# Patient Record
Sex: MALE | Race: WHITE | Employment: OTHER | URBAN - METROPOLITAN AREA
[De-identification: names, ages, dates, MRNs, and addresses within clinical notes are randomized per-mention and may not be internally consistent; named-entity substitution may affect disease eponyms.]

---

## 2017-01-18 ENCOUNTER — RECORDS - HEALTHEAST (OUTPATIENT)
Dept: LAB | Facility: CLINIC | Age: 82
End: 2017-01-18

## 2017-01-18 LAB — PSA SERPL-MCNC: <0.1 NG/ML (ref 0–6.5)

## 2017-07-19 ENCOUNTER — RECORDS - HEALTHEAST (OUTPATIENT)
Dept: LAB | Facility: CLINIC | Age: 82
End: 2017-07-19

## 2017-07-19 LAB
CHOLEST SERPL-MCNC: 148 MG/DL
FASTING STATUS PATIENT QL REPORTED: ABNORMAL
HDLC SERPL-MCNC: 33 MG/DL
LDLC SERPL CALC-MCNC: 77 MG/DL
TRIGL SERPL-MCNC: 189 MG/DL

## 2018-01-29 ENCOUNTER — RECORDS - HEALTHEAST (OUTPATIENT)
Dept: LAB | Facility: CLINIC | Age: 83
End: 2018-01-29

## 2018-01-29 ENCOUNTER — RECORDS - HEALTHEAST (OUTPATIENT)
Dept: ADMINISTRATIVE | Facility: OTHER | Age: 83
End: 2018-01-29

## 2018-01-29 LAB
ANION GAP SERPL CALCULATED.3IONS-SCNC: 11 MMOL/L (ref 5–18)
BUN SERPL-MCNC: 18 MG/DL (ref 8–28)
CALCIUM SERPL-MCNC: 9.1 MG/DL (ref 8.5–10.5)
CHLORIDE BLD-SCNC: 102 MMOL/L (ref 98–107)
CO2 SERPL-SCNC: 23 MMOL/L (ref 22–31)
CREAT SERPL-MCNC: 1.04 MG/DL (ref 0.7–1.3)
GFR SERPL CREATININE-BSD FRML MDRD: >60 ML/MIN/1.73M2
GLUCOSE BLD-MCNC: 166 MG/DL (ref 70–125)
POTASSIUM BLD-SCNC: 4.1 MMOL/L (ref 3.5–5)
SODIUM SERPL-SCNC: 136 MMOL/L (ref 136–145)

## 2018-02-20 ENCOUNTER — COMMUNICATION - HEALTHEAST (OUTPATIENT)
Dept: SCHEDULING | Facility: CLINIC | Age: 83
End: 2018-02-20

## 2018-02-20 DIAGNOSIS — I10 ACCELERATED HYPERTENSION: ICD-10-CM

## 2018-03-07 ENCOUNTER — OFFICE VISIT - HEALTHEAST (OUTPATIENT)
Dept: PULMONOLOGY | Facility: OTHER | Age: 83
End: 2018-03-07

## 2018-03-07 DIAGNOSIS — R05.3 CHRONIC COUGH: ICD-10-CM

## 2018-03-07 DIAGNOSIS — R06.09 DYSPNEA ON EXERTION: ICD-10-CM

## 2018-03-07 DIAGNOSIS — J32.9 CHRONIC RHINOSINUSITIS: ICD-10-CM

## 2018-03-07 ASSESSMENT — MIFFLIN-ST. JEOR: SCORE: 1827.97

## 2018-03-14 ENCOUNTER — RECORDS - HEALTHEAST (OUTPATIENT)
Dept: LAB | Facility: CLINIC | Age: 83
End: 2018-03-14

## 2018-03-14 LAB — PSA SERPL-MCNC: <0.1 NG/ML (ref 0–6.5)

## 2018-04-09 ENCOUNTER — HOSPITAL ENCOUNTER (OUTPATIENT)
Dept: CT IMAGING | Facility: HOSPITAL | Age: 83
Discharge: HOME | End: 2018-04-09
Attending: INTERNAL MEDICINE

## 2018-04-09 DIAGNOSIS — R05.3 CHRONIC COUGH: ICD-10-CM

## 2018-04-09 DIAGNOSIS — J32.9 CHRONIC RHINOSINUSITIS: ICD-10-CM

## 2018-04-16 ENCOUNTER — RECORDS - HEALTHEAST (OUTPATIENT)
Dept: ADMINISTRATIVE | Facility: OTHER | Age: 83
End: 2018-04-16

## 2018-04-16 ENCOUNTER — RECORDS - HEALTHEAST (OUTPATIENT)
Dept: PULMONOLOGY | Facility: OTHER | Age: 83
End: 2018-04-16

## 2018-04-16 ENCOUNTER — OFFICE VISIT - HEALTHEAST (OUTPATIENT)
Dept: PULMONOLOGY | Facility: OTHER | Age: 83
End: 2018-04-16

## 2018-04-16 DIAGNOSIS — G47.33 OSA (OBSTRUCTIVE SLEEP APNEA): ICD-10-CM

## 2018-04-16 DIAGNOSIS — J44.9 COPD (CHRONIC OBSTRUCTIVE PULMONARY DISEASE) (H): ICD-10-CM

## 2018-04-16 DIAGNOSIS — J32.9 CHRONIC SINUSITIS, UNSPECIFIED: ICD-10-CM

## 2018-04-16 DIAGNOSIS — R05.9 COUGH: ICD-10-CM

## 2018-04-16 DIAGNOSIS — R06.09 OTHER FORMS OF DYSPNEA: ICD-10-CM

## 2018-04-16 LAB — HGB BLD-MCNC: 15 G/DL

## 2018-04-30 ENCOUNTER — RECORDS - HEALTHEAST (OUTPATIENT)
Dept: LAB | Facility: CLINIC | Age: 83
End: 2018-04-30

## 2018-04-30 LAB — TSH SERPL DL<=0.005 MIU/L-ACNC: 3.09 UIU/ML (ref 0.3–5)

## 2018-06-05 ENCOUNTER — OFFICE VISIT - HEALTHEAST (OUTPATIENT)
Dept: SLEEP MEDICINE | Facility: CLINIC | Age: 83
End: 2018-06-05

## 2018-06-05 DIAGNOSIS — R06.83 SNORING: ICD-10-CM

## 2018-06-05 DIAGNOSIS — G47.10 HYPERSOMNIA: ICD-10-CM

## 2018-06-05 DIAGNOSIS — G47.8 SLEEP DYSFUNCTION WITH SLEEP STAGE DISTURBANCE: ICD-10-CM

## 2018-06-05 DIAGNOSIS — R03.0 ELEVATED BLOOD PRESSURE READING: ICD-10-CM

## 2018-06-05 ASSESSMENT — MIFFLIN-ST. JEOR: SCORE: 1765.83

## 2018-06-06 ENCOUNTER — RECORDS - HEALTHEAST (OUTPATIENT)
Dept: ADMINISTRATIVE | Facility: OTHER | Age: 83
End: 2018-06-06

## 2018-06-26 ENCOUNTER — RECORDS - HEALTHEAST (OUTPATIENT)
Dept: ADMINISTRATIVE | Facility: OTHER | Age: 83
End: 2018-06-26

## 2018-06-26 ENCOUNTER — RECORDS - HEALTHEAST (OUTPATIENT)
Dept: SLEEP MEDICINE | Facility: CLINIC | Age: 83
End: 2018-06-26

## 2018-06-26 DIAGNOSIS — G47.10 HYPERSOMNIA, UNSPECIFIED: ICD-10-CM

## 2018-06-26 DIAGNOSIS — R06.83 SNORING: ICD-10-CM

## 2018-06-26 DIAGNOSIS — G47.8 OTHER SLEEP DISORDERS: ICD-10-CM

## 2018-06-28 ENCOUNTER — RECORDS - HEALTHEAST (OUTPATIENT)
Dept: ADMINISTRATIVE | Facility: OTHER | Age: 83
End: 2018-06-28

## 2018-06-29 ENCOUNTER — OFFICE VISIT - HEALTHEAST (OUTPATIENT)
Dept: FAMILY MEDICINE | Facility: CLINIC | Age: 83
End: 2018-06-29

## 2018-06-29 ENCOUNTER — COMMUNICATION - HEALTHEAST (OUTPATIENT)
Dept: SLEEP MEDICINE | Facility: CLINIC | Age: 83
End: 2018-06-29

## 2018-06-29 DIAGNOSIS — G47.31 CENTRAL SLEEP APNEA: ICD-10-CM

## 2018-06-29 DIAGNOSIS — M54.30 SCIATIC NERVE PAIN: ICD-10-CM

## 2018-06-29 DIAGNOSIS — G47.33 OSA (OBSTRUCTIVE SLEEP APNEA): ICD-10-CM

## 2018-07-02 ENCOUNTER — COMMUNICATION - HEALTHEAST (OUTPATIENT)
Dept: SLEEP MEDICINE | Facility: CLINIC | Age: 83
End: 2018-07-02

## 2018-07-02 ENCOUNTER — HOSPITAL ENCOUNTER (OUTPATIENT)
Dept: PHYSICAL MEDICINE AND REHAB | Facility: CLINIC | Age: 83
Discharge: HOME | End: 2018-07-02
Attending: PHYSICIAN ASSISTANT

## 2018-07-02 DIAGNOSIS — M54.41 RIGHT-SIDED LOW BACK PAIN WITH RIGHT-SIDED SCIATICA: ICD-10-CM

## 2018-07-02 DIAGNOSIS — M54.16 RIGHT LUMBAR RADICULOPATHY: ICD-10-CM

## 2018-07-18 ENCOUNTER — RECORDS - HEALTHEAST (OUTPATIENT)
Dept: SLEEP MEDICINE | Facility: CLINIC | Age: 83
End: 2018-07-18

## 2018-07-18 ENCOUNTER — RECORDS - HEALTHEAST (OUTPATIENT)
Dept: ADMINISTRATIVE | Facility: OTHER | Age: 83
End: 2018-07-18

## 2018-07-18 DIAGNOSIS — G47.31 PRIMARY CENTRAL SLEEP APNEA: ICD-10-CM

## 2018-07-18 DIAGNOSIS — G47.33 OBSTRUCTIVE SLEEP APNEA (ADULT) (PEDIATRIC): ICD-10-CM

## 2018-07-19 ENCOUNTER — OFFICE VISIT - HEALTHEAST (OUTPATIENT)
Dept: PHYSICAL THERAPY | Facility: REHABILITATION | Age: 83
End: 2018-07-19

## 2018-07-19 DIAGNOSIS — R26.9 ABNORMALITY OF GAIT: ICD-10-CM

## 2018-07-19 DIAGNOSIS — M62.81 GENERALIZED MUSCLE WEAKNESS: ICD-10-CM

## 2018-07-19 DIAGNOSIS — M25.69 DECREASED RANGE OF MOTION OF TRUNK AND BACK: ICD-10-CM

## 2018-07-19 DIAGNOSIS — M54.16 ACUTE RIGHT LUMBAR RADICULOPATHY: ICD-10-CM

## 2018-07-23 ENCOUNTER — COMMUNICATION - HEALTHEAST (OUTPATIENT)
Dept: SLEEP MEDICINE | Facility: CLINIC | Age: 83
End: 2018-07-23

## 2018-07-23 DIAGNOSIS — G47.33 OSA (OBSTRUCTIVE SLEEP APNEA): ICD-10-CM

## 2018-07-23 DIAGNOSIS — G47.31 CENTRAL SLEEP APNEA: ICD-10-CM

## 2018-07-24 ENCOUNTER — AMBULATORY - HEALTHEAST (OUTPATIENT)
Dept: SLEEP MEDICINE | Facility: CLINIC | Age: 83
End: 2018-07-24

## 2018-07-24 ENCOUNTER — COMMUNICATION - HEALTHEAST (OUTPATIENT)
Dept: SLEEP MEDICINE | Facility: CLINIC | Age: 83
End: 2018-07-24

## 2018-07-26 ENCOUNTER — RECORDS - HEALTHEAST (OUTPATIENT)
Dept: ADMINISTRATIVE | Facility: OTHER | Age: 83
End: 2018-07-26

## 2018-07-27 ENCOUNTER — COMMUNICATION - HEALTHEAST (OUTPATIENT)
Dept: SLEEP MEDICINE | Facility: CLINIC | Age: 83
End: 2018-07-27

## 2018-07-31 ENCOUNTER — OFFICE VISIT - HEALTHEAST (OUTPATIENT)
Dept: PULMONOLOGY | Facility: OTHER | Age: 83
End: 2018-07-31

## 2018-07-31 DIAGNOSIS — J32.9 CHRONIC RHINOSINUSITIS: ICD-10-CM

## 2018-07-31 RX ORDER — LORATADINE 10 MG/1
10 CAPSULE, LIQUID FILLED ORAL DAILY
Qty: 30 EACH | Refills: 11 | Status: ON HOLD | OUTPATIENT
Start: 2018-07-31 | End: 2022-04-02

## 2018-08-02 ENCOUNTER — OFFICE VISIT - HEALTHEAST (OUTPATIENT)
Dept: PHYSICAL THERAPY | Facility: REHABILITATION | Age: 83
End: 2018-08-02

## 2018-08-02 DIAGNOSIS — M25.69 DECREASED RANGE OF MOTION OF TRUNK AND BACK: ICD-10-CM

## 2018-08-02 DIAGNOSIS — M62.81 GENERALIZED MUSCLE WEAKNESS: ICD-10-CM

## 2018-08-02 DIAGNOSIS — M54.16 ACUTE RIGHT LUMBAR RADICULOPATHY: ICD-10-CM

## 2018-08-02 DIAGNOSIS — R26.9 ABNORMALITY OF GAIT: ICD-10-CM

## 2018-08-23 ENCOUNTER — OFFICE VISIT - HEALTHEAST (OUTPATIENT)
Dept: PHYSICAL THERAPY | Facility: REHABILITATION | Age: 83
End: 2018-08-23

## 2018-08-23 DIAGNOSIS — R26.9 ABNORMALITY OF GAIT: ICD-10-CM

## 2018-08-23 DIAGNOSIS — M62.81 GENERALIZED MUSCLE WEAKNESS: ICD-10-CM

## 2018-08-23 DIAGNOSIS — M54.16 ACUTE RIGHT LUMBAR RADICULOPATHY: ICD-10-CM

## 2018-08-23 DIAGNOSIS — M25.69 DECREASED RANGE OF MOTION OF TRUNK AND BACK: ICD-10-CM

## 2018-09-04 ENCOUNTER — OFFICE VISIT - HEALTHEAST (OUTPATIENT)
Dept: SLEEP MEDICINE | Facility: CLINIC | Age: 83
End: 2018-09-04

## 2018-09-04 DIAGNOSIS — G47.31 CENTRAL SLEEP APNEA: ICD-10-CM

## 2018-09-04 DIAGNOSIS — G47.33 OBSTRUCTIVE SLEEP APNEA: ICD-10-CM

## 2018-09-04 DIAGNOSIS — G47.69 SLEEP-RELATED MOVEMENT DISORDER: ICD-10-CM

## 2018-09-04 ASSESSMENT — MIFFLIN-ST. JEOR: SCORE: 1780.35

## 2018-10-29 ENCOUNTER — RECORDS - HEALTHEAST (OUTPATIENT)
Dept: LAB | Facility: CLINIC | Age: 83
End: 2018-10-29

## 2018-10-29 LAB
CHOLEST SERPL-MCNC: 150 MG/DL
FASTING STATUS PATIENT QL REPORTED: ABNORMAL
HDLC SERPL-MCNC: 37 MG/DL
LDLC SERPL CALC-MCNC: 79 MG/DL
TRIGL SERPL-MCNC: 171 MG/DL

## 2018-10-30 ENCOUNTER — OFFICE VISIT - HEALTHEAST (OUTPATIENT)
Dept: SLEEP MEDICINE | Facility: CLINIC | Age: 83
End: 2018-10-30

## 2018-10-30 DIAGNOSIS — R06.3 CENTRAL SLEEP APNEA DUE TO CHEYNE-STOKES RESPIRATION: ICD-10-CM

## 2018-10-30 DIAGNOSIS — G47.33 OBSTRUCTIVE SLEEP APNEA: ICD-10-CM

## 2018-10-30 DIAGNOSIS — G47.8 SLEEP DYSFUNCTION WITH SLEEP STAGE DISTURBANCE: ICD-10-CM

## 2018-12-11 ENCOUNTER — OFFICE VISIT - HEALTHEAST (OUTPATIENT)
Dept: SLEEP MEDICINE | Facility: CLINIC | Age: 83
End: 2018-12-11

## 2018-12-11 DIAGNOSIS — G47.31 CENTRAL SLEEP APNEA: ICD-10-CM

## 2018-12-11 DIAGNOSIS — G47.8 SLEEP DYSFUNCTION WITH SLEEP STAGE DISTURBANCE: ICD-10-CM

## 2018-12-11 DIAGNOSIS — G47.33 OBSTRUCTIVE SLEEP APNEA: ICD-10-CM

## 2018-12-11 ASSESSMENT — MIFFLIN-ST. JEOR: SCORE: 1825.7

## 2019-01-03 ENCOUNTER — COMMUNICATION - HEALTHEAST (OUTPATIENT)
Dept: PULMONOLOGY | Facility: OTHER | Age: 84
End: 2019-01-03

## 2019-01-03 DIAGNOSIS — J84.9 INTERSTITIAL PNEUMONIA (H): ICD-10-CM

## 2019-01-03 DIAGNOSIS — J98.8 RESPIRATORY INFECTION: ICD-10-CM

## 2019-01-03 RX ORDER — ALBUTEROL SULFATE 90 UG/1
2 AEROSOL, METERED RESPIRATORY (INHALATION) EVERY 6 HOURS PRN
Qty: 1 INHALER | Refills: 11 | Status: ON HOLD | OUTPATIENT
Start: 2019-01-03 | End: 2022-04-02

## 2019-02-12 ENCOUNTER — OFFICE VISIT - HEALTHEAST (OUTPATIENT)
Dept: PULMONOLOGY | Facility: OTHER | Age: 84
End: 2019-02-12

## 2019-02-12 DIAGNOSIS — J32.9 CHRONIC SINUSITIS, UNSPECIFIED LOCATION: ICD-10-CM

## 2019-02-12 ASSESSMENT — MIFFLIN-ST. JEOR: SCORE: 1825.7

## 2019-03-11 ENCOUNTER — RECORDS - HEALTHEAST (OUTPATIENT)
Dept: LAB | Facility: CLINIC | Age: 84
End: 2019-03-11

## 2019-03-11 LAB
ANION GAP SERPL CALCULATED.3IONS-SCNC: 11 MMOL/L (ref 5–18)
BUN SERPL-MCNC: 16 MG/DL (ref 8–28)
CALCIUM SERPL-MCNC: 9.6 MG/DL (ref 8.5–10.5)
CHLORIDE BLD-SCNC: 105 MMOL/L (ref 98–107)
CO2 SERPL-SCNC: 24 MMOL/L (ref 22–31)
CREAT SERPL-MCNC: 1.09 MG/DL (ref 0.7–1.3)
GFR SERPL CREATININE-BSD FRML MDRD: >60 ML/MIN/1.73M2
GLUCOSE BLD-MCNC: 105 MG/DL (ref 70–125)
POTASSIUM BLD-SCNC: 4.4 MMOL/L (ref 3.5–5)
PSA SERPL-MCNC: <0.1 NG/ML (ref 0–6.5)
SODIUM SERPL-SCNC: 140 MMOL/L (ref 136–145)

## 2020-03-16 ENCOUNTER — RECORDS - HEALTHEAST (OUTPATIENT)
Dept: LAB | Facility: CLINIC | Age: 85
End: 2020-03-16

## 2020-03-16 LAB
ANION GAP SERPL CALCULATED.3IONS-SCNC: 12 MMOL/L (ref 5–18)
BUN SERPL-MCNC: 13 MG/DL (ref 8–28)
CALCIUM SERPL-MCNC: 9.3 MG/DL (ref 8.5–10.5)
CHLORIDE BLD-SCNC: 101 MMOL/L (ref 98–107)
CHOLEST SERPL-MCNC: 138 MG/DL
CO2 SERPL-SCNC: 22 MMOL/L (ref 22–31)
CREAT SERPL-MCNC: 1.08 MG/DL (ref 0.7–1.3)
CREAT UR-MCNC: 152.6 MG/DL
FASTING STATUS PATIENT QL REPORTED: ABNORMAL
GFR SERPL CREATININE-BSD FRML MDRD: >60 ML/MIN/1.73M2
GLUCOSE BLD-MCNC: 292 MG/DL (ref 70–125)
HDLC SERPL-MCNC: 33 MG/DL
LDLC SERPL CALC-MCNC: 63 MG/DL
MICROALBUMIN UR-MCNC: 3.4 MG/DL (ref 0–1.99)
MICROALBUMIN/CREAT UR: 22.3 MG/G
POTASSIUM BLD-SCNC: 4.3 MMOL/L (ref 3.5–5)
SODIUM SERPL-SCNC: 135 MMOL/L (ref 136–145)
TRIGL SERPL-MCNC: 210 MG/DL

## 2021-03-17 ENCOUNTER — RECORDS - HEALTHEAST (OUTPATIENT)
Dept: LAB | Facility: CLINIC | Age: 86
End: 2021-03-17

## 2021-03-17 LAB
ANION GAP SERPL CALCULATED.3IONS-SCNC: 12 MMOL/L (ref 5–18)
BUN SERPL-MCNC: 14 MG/DL (ref 8–28)
CALCIUM SERPL-MCNC: 9.8 MG/DL (ref 8.5–10.5)
CHLORIDE BLD-SCNC: 99 MMOL/L (ref 98–107)
CHOLEST SERPL-MCNC: 112 MG/DL
CO2 SERPL-SCNC: 24 MMOL/L (ref 22–31)
CREAT SERPL-MCNC: 0.97 MG/DL (ref 0.7–1.3)
FASTING STATUS PATIENT QL REPORTED: NORMAL
FOLATE SERPL-MCNC: 16.4 NG/ML
GFR SERPL CREATININE-BSD FRML MDRD: >60 ML/MIN/1.73M2
GLUCOSE BLD-MCNC: 177 MG/DL (ref 70–125)
HDLC SERPL-MCNC: 45 MG/DL
LDLC SERPL CALC-MCNC: 47 MG/DL
POTASSIUM BLD-SCNC: 4.4 MMOL/L (ref 3.5–5)
SODIUM SERPL-SCNC: 135 MMOL/L (ref 136–145)
TRIGL SERPL-MCNC: 101 MG/DL
TSH SERPL DL<=0.005 MIU/L-ACNC: 3.48 UIU/ML (ref 0.3–5)
VIT B12 SERPL-MCNC: 638 PG/ML (ref 213–816)

## 2021-03-23 ENCOUNTER — RECORDS - HEALTHEAST (OUTPATIENT)
Dept: LAB | Facility: CLINIC | Age: 86
End: 2021-03-23

## 2021-03-23 LAB
FERRITIN SERPL-MCNC: 11 NG/ML (ref 27–300)
IRON SATN MFR SERPL: 4 % (ref 20–50)
IRON SERPL-MCNC: 15 UG/DL (ref 42–175)
IRON SERPL-MCNC: 15 UG/DL (ref 42–175)
TIBC SERPL-MCNC: 393 UG/DL (ref 313–563)
TRANSFERRIN SERPL-MCNC: 314 MG/DL (ref 212–360)

## 2021-05-28 ENCOUNTER — RECORDS - HEALTHEAST (OUTPATIENT)
Dept: ADMINISTRATIVE | Facility: CLINIC | Age: 86
End: 2021-05-28

## 2021-06-01 VITALS — HEIGHT: 72 IN | WEIGHT: 232 LBS | BODY MASS INDEX: 31.42 KG/M2

## 2021-06-01 VITALS — WEIGHT: 230.6 LBS | BODY MASS INDEX: 31.27 KG/M2

## 2021-06-01 VITALS — WEIGHT: 235.9 LBS | BODY MASS INDEX: 31.99 KG/M2

## 2021-06-01 VITALS — WEIGHT: 242.5 LBS | BODY MASS INDEX: 32.85 KG/M2 | HEIGHT: 72 IN

## 2021-06-01 VITALS — WEIGHT: 228.8 LBS | HEIGHT: 72 IN | BODY MASS INDEX: 30.99 KG/M2

## 2021-06-01 VITALS — BODY MASS INDEX: 31.33 KG/M2 | WEIGHT: 231 LBS

## 2021-06-01 VITALS — BODY MASS INDEX: 30.92 KG/M2 | WEIGHT: 228 LBS

## 2021-06-02 VITALS — HEIGHT: 72 IN | WEIGHT: 242 LBS | BODY MASS INDEX: 32.78 KG/M2

## 2021-06-02 VITALS — BODY MASS INDEX: 31.46 KG/M2 | WEIGHT: 232 LBS

## 2021-06-02 VITALS — HEIGHT: 72 IN | BODY MASS INDEX: 32.78 KG/M2 | WEIGHT: 242 LBS

## 2021-06-16 PROBLEM — J84.9 INTERSTITIAL PNEUMONIA (H): Status: ACTIVE | Noted: 2018-01-22

## 2021-06-16 PROBLEM — R00.0 TACHYCARDIA: Status: ACTIVE | Noted: 2018-01-21

## 2021-06-16 PROBLEM — E87.20 LACTIC ACID ACIDOSIS: Status: ACTIVE | Noted: 2018-01-21

## 2021-06-16 PROBLEM — J98.8 RESPIRATORY INFECTION: Status: ACTIVE | Noted: 2018-01-21

## 2021-06-16 PROBLEM — N17.9 AKI (ACUTE KIDNEY INJURY) (H): Status: ACTIVE | Noted: 2018-01-21

## 2021-06-16 NOTE — PROGRESS NOTES
"Pulmonary Clinic Outpatient Consultation    Assessment and Plan:   Solitario Benjamin is a 82 y.o. male with a history of tobacco dependence in remission, DM2, HTN, dyslipidemia, colon polyp, prostate CA s/p robotic prostatectomy in 2005 and radiation around 2016, cholelithiasis, GERD, large hiatal hernia, hospitalization for pneumonia in January, presenting for evaluation of chronic cough and dyspnea.    Chronic cough and dyspnea: Clinical and exam evidence of chronic rhinosinusitis.  Has GERD and large hiatal hernia and only on 20 mg daily omeprazola.  Also with significant emphysema on recent chest CT.  Had a focal area of inflammation in the right lung on chest CT on 1/22/18 consistent with pneumonia which appears inflammatory.    Plan:  - start nasal fluticasone one spray in each nostril daily  - start loratadine-pseudoephedrine one tab daily  - follow up in six weeks with repeat chest CT and complete PFTs  - if refractory cough consider increase in omeprazole from 20 mg daily to 20 mg BID given GERD, large hiatal hernia and possible laryngopharyngeal reflux contributing to cough  - encouraged him to call any time with questions or concerning symptoms    I appreciate the opportunity to participate in the care of Mr. Benjamin.  Please feel free to contact me at any time.    CCx: chronic cough and dyspnea    HPI: Solitario Benjamin is a 82 y.o. male with a history of tobacco dependence in remission, DM2, HTN, dyslipidemia, colon polyp, prostate CA s/p robotic prostatectomy in 2005, cholelithiasis, hospitalization for pneumonia in January, presenting for evaluation of chronic cough and dyspnea. Had cough in January, was hospitalized and treated for pneumonia and was on a prednisone burst.  Notes that he has \"always had heavy breathing.\"  Now any walking leads to dyspnea.  Cough improved but his ribs still sore from coughing.  Has a lot of phlegm production, tan in color.  Occasional sinus pressure and congestion.  Minimal " rhinorrhea and postnasal drip.  Uses breath-rite strips at night to open up the nasal passages.  Albuterol provides minimal benefit.  Started smoking at age 20, up to 2+ ppd, quit at age 47.  No FHx of lung disease.  As a senior in high school was in and out of the hospital for sinus infections and cough.  Was found to be allergic to grain/hay dust at that time.    ROS:  A 12-system review was obtained and was negative with the exception of the symptoms endorsed in the history of present illness.    PMH:  Tobacco dependence in remission  DM2  HTN  Recent CAP  DL  Colon polyp  Prostate CA s/p prostatectomy 2005 and radiation around 2016    PSH:  No past surgical history on file.    Allergies:  No Known Allergies    Family HX:  No family history on file.    Social Hx:  Social History     Social History     Marital status:      Spouse name: N/A     Number of children: N/A     Years of education: N/A     Occupational History     Not on file.     Social History Main Topics     Smoking status: Former Smoker     Types: Cigarettes     Quit date: 1983     Smokeless tobacco: Former User     Alcohol use No     Drug use: No     Sexual activity: Not Currently     Other Topics Concern     Not on file     Social History Narrative       Current Meds:  Current Outpatient Prescriptions   Medication Sig Dispense Refill     acetaminophen (TYLENOL) 325 MG tablet Take 2 tablets (650 mg total) by mouth every 4 (four) hours as needed.  0     albuterol (PROAIR HFA;PROVENTIL HFA;VENTOLIN HFA) 90 mcg/actuation inhaler Inhale 2 puffs every 6 (six) hours as needed for wheezing or shortness of breath. 1 Inhaler 1     aspirin 81 MG EC tablet Take 81 mg by mouth at bedtime.       atorvastatin (LIPITOR) 20 MG tablet Take 20 mg by mouth at bedtime.       dextromethorphan-guaiFENesin (ROBITUSSIN-DM)  mg/5 mL liquid Take 5 mL by mouth every 4 (four) hours as needed.  0     glucosamine sulfate 500 mg cap Take 1,000 mg by mouth daily.        lisinopril (PRINIVIL,ZESTRIL) 20 MG tablet Take 20 mg by mouth daily.       metFORMIN (GLUCOPHAGE) 1000 MG tablet Take 1,000 mg by mouth 2 (two) times a day with meals.       multivitamin therapeutic tablet Take 1 tablet by mouth daily.       OMEGA-3/DHA/EPA/FISH OIL (FISH OIL-OMEGA-3 FATTY ACIDS) 300-1,000 mg capsule Take 2 g by mouth daily.       omeprazole (PRILOSEC) 20 MG capsule Take 20 mg by mouth daily.       predniSONE (DELTASONE) 20 MG tablet Take 1 tablet (20 mg total) by mouth daily with breakfast. 4 tablet 0     vitamin E 400 unit capsule Take 400 Units by mouth daily.       amLODIPine (NORVASC) 5 MG tablet Take 1 tablet (5 mg total) by mouth daily. 30 tablet 0     fluticasone (FLONASE) 50 mcg/actuation nasal spray One spray in each nostril daily 16 g 12     loratadine-pseudoephedrine (CLARITIN-D 24 HOUR)  mg per 24 hr tablet Take 1 tablet by mouth daily. 30 tablet 11     No current facility-administered medications for this visit.        Physical Exam:  /62  Pulse 80  Resp 20  Ht 6' (1.829 m)  Wt (!) 242 lb 8 oz (110 kg)  SpO2 95% Comment: RA  BMI 32.89 kg/m2  Gen: alert, oriented, no distress  HEENT: nasal turbinates are unremarkable, no oropharyngeal lesions, no cervical or supraclavicular lymphadenopathy  CV: RRR, no M/G/R  Resp: CTAB, no focal crackles or wheezes  Abd: soft, nontender, no palpable organomegaly  Skin: no apparent rashes  Ext: no cyanosis, clubbing or edema  Neuro: alert, nonfocal    Labs:  reviewed    Imaging studies:  High-res chest CT (1/22/18):  - images directly reviewed, formal interpretation follows:  FINDINGS:  LUNGS AND PLEURA: Moderate degree of centrilobular emphysematous changes of the lungs. There is also a 8.5 x 5.3 cm lung bleb at the apex of the left upper lobe. Small amount of bandlike interstitial opacity involves the posterior lateral aspect of the   right upper lobe. There is no evidence of subpleural reticulation or honeycombing. No  bronchiectasis. No air trapping is seen.     MEDIASTINUM: Heart size is normal. Thoracic aorta is normal in caliber. There is diffuse atherosclerotic calcification of the thoracic aortic wall. No abnormally enlarged lymph nodes are seen within the chest.     LIMITED UPPER ABDOMEN: Large hiatal hernia. Cholelithiasis.     MUSCULOSKELETAL: No suspicious osseous lesions.     IMPRESSION:   CONCLUSION:  1.  Moderate centrilobular emphysematous changes of the lungs. This is not significantly changed since comparison CT scan.  2.  No specific evidence of interstitial lung disease. There is no honeycombing, subpleural reticulation, or bronchiectasis.  3.  Small amount of bandlike interstitial infiltrate involving the posterolateral right upper lobe. These findings may reflect an infectious or inflammatory process.  4.  Large hiatal hernia.  5.  Cholelithiasis.    TTE (1/22/18):  - EF 55-60%  - no valve disease  - normal RV size/function  - mild LAE    Cory Boateng MD  [facility]  Cell [phone]  Office [phone]  Pager [phone]

## 2021-06-17 NOTE — PROGRESS NOTES
Pulmonary Clinic Follow-up Visit    Assessment and Plan:   82 year old male former smoker with a history of DM2, HTN, dyslipidemia, colon polyp, prostate CA s/p robotic prostatectomy in 2005 and radiation around 2016, cholelithiasis, GERD, large hiatal hernia, hospitalization for pneumonia in January 2018, presenting for follow-up of chronic cough and dyspnea.     Chronic cough and dyspnea:  Emphysema on CT and bronchodilator reversibility on PFTs, though FEV1/FVC ratio is greater than the demographically adjusted lower limit of normal.  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  May find symptomatic improvement from pulmonary rehabilitation and LAMA-LABA therapy.  Also difficulty sleeping, daytime naps and snores heavily according to family members who accompany him today.  Has a history of sinus congestion and cough has improved with nasal fluticasone; the loratadine-pseudoephedrine helped transiently but took it for one month and did not refill it, though cough remains stable.    Plan:  - start umeclidinium-vilanterol one inhalation daily  - albuterol HFA as needed  - enroll in pulmonary rehabilitation in [place]  - continue nasal fluticasone one spray in each nostril daily  - loratadine-pseudoephedrine as needed if cough/sinus congestion worsen  - continue PPI  - referral to sleep medicine for evaluation of possible HECTOR  - annual influenza vaccination  - up to date on pneumococcal vaccination  - follow up in 3 months  - encouraged him to call any time with questions or concerning symptoms     I appreciate the opportunity to participate in the care of Mr. Benjamin.  Please feel free to contact me at any time.     CCx: COPD, chronic cough, chronic rhinosinusitis, hiatal hernia/PPI, snoring    HPI: 82 year old male former smoker with a history of DM2, HTN, dyslipidemia, colon polyp, prostate CA s/p robotic prostatectomy in 2005 and radiation around 2016, cholelithiasis, GERD, large hiatal  hernia, hospitalization for pneumonia in January 2018, presenting for follow-up of chronic cough and dyspnea. Emphysema on CT and bronchodilator reversibility on PFTs, though FEV1/FVC ratio is greater than the demographically adjusted lower limit of normal.  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  The small area of pneumonia in the right lung has resolved.  May find symptomatic improvement from pulmonary rehabilitation and LAMA-LABA therapy.  Also difficulty sleeping, daytime naps and snores heavily according to family members who accompany him today.  Has a history of sinus congestion and cough has improved with nasal fluticasone; the loratadine-pseudoephedrine helped transiently but took it for one month and did not refill it, though cough remains stable.    ROS:  A 12-system review was obtained and was negative with the exception of the symptoms endorsed in the history of present illness.    PMH:  Tobacco dependence in remission  DM2  HTN  COPD  Chronic rhinosinusitis  Daytime hypersomnolence, difficulty sleeping, snoring  DL  Colon polyp  Prostate CA s/p prostatectomy 2005 and radiation around 2016    PSH:  No past surgical history on file.    Allergies:  No Known Allergies    Family HX:  No family history on file.    Social Hx:  Social History     Social History     Marital status:      Spouse name: N/A     Number of children: N/A     Years of education: N/A     Occupational History     Not on file.     Social History Main Topics     Smoking status: Former Smoker     Types: Cigarettes     Quit date: 1983     Smokeless tobacco: Former User     Alcohol use No     Drug use: No     Sexual activity: Not Currently     Other Topics Concern     Not on file     Social History Narrative       Current Meds:  Current Outpatient Prescriptions   Medication Sig Dispense Refill     acetaminophen (TYLENOL) 325 MG tablet Take 2 tablets (650 mg total) by mouth every 4 (four) hours as needed.  0      albuterol (PROAIR HFA;PROVENTIL HFA;VENTOLIN HFA) 90 mcg/actuation inhaler Inhale 2 puffs every 6 (six) hours as needed for wheezing or shortness of breath. 1 Inhaler 1     aspirin 81 MG EC tablet Take 81 mg by mouth at bedtime.       atorvastatin (LIPITOR) 20 MG tablet Take 20 mg by mouth at bedtime.       dextromethorphan-guaiFENesin (ROBITUSSIN-DM)  mg/5 mL liquid Take 5 mL by mouth every 4 (four) hours as needed.  0     fluticasone (FLONASE) 50 mcg/actuation nasal spray One spray in each nostril daily 16 g 12     glucosamine sulfate 500 mg cap Take 1,000 mg by mouth daily.       lisinopril (PRINIVIL,ZESTRIL) 20 MG tablet Take 20 mg by mouth daily.       loratadine-pseudoephedrine (CLARITIN-D 24 HOUR)  mg per 24 hr tablet Take 1 tablet by mouth daily. 30 tablet 11     metFORMIN (GLUCOPHAGE) 1000 MG tablet Take 1,000 mg by mouth 2 (two) times a day with meals.       multivitamin therapeutic tablet Take 1 tablet by mouth daily.       OMEGA-3/DHA/EPA/FISH OIL (FISH OIL-OMEGA-3 FATTY ACIDS) 300-1,000 mg capsule Take 2 g by mouth daily.       omeprazole (PRILOSEC) 20 MG capsule Take 20 mg by mouth daily.       predniSONE (DELTASONE) 20 MG tablet Take 1 tablet (20 mg total) by mouth daily with breakfast. 4 tablet 0     vitamin E 400 unit capsule Take 400 Units by mouth daily.       amLODIPine (NORVASC) 5 MG tablet Take 1 tablet (5 mg total) by mouth daily. 30 tablet 0     umeclidinium-vilanterol (ANORO ELLIPTA) 62.5-25 mcg/actuation inhaler Inhale 1 puff daily. 60 puff 11     No current facility-administered medications for this visit.        Physical Exam:  /82  Pulse 88  Resp 20  Wt (!) 235 lb 14.4 oz (107 kg)  SpO2 93% Comment: RA  BMI 31.99 kg/m2  Gen: alert, oriented, no distress  HEENT: nasal turbinates are unremarkable, no oropharyngeal lesions, no cervical or supraclavicular lymphadenopathy  CV: RRR, no M/G/R  Resp: CTAB, no focal crackles or wheezes  Abd: soft, nontender, no palpable  organomegaly  Skin: no apparent rashes  Ext: no cyanosis, clubbing or edema  Neuro: alert, nonfocal    Labs:  reviewed    Imaging studies:  CT chest (4/9/18):  - images directly reviewed, formal interpretation follows:  FINDINGS:  LUNGS AND PLEURA: Moderate diffuse emphysema with large bulla in the left upper lobe medially unchanged.     Resolution of the subtle interstitial infiltrate posterior right upper lobe since 01/22/2018 likely mild pneumonia. Lungs are now clear of infiltrates.     MEDIASTINUM: No lymphadenopathy. Large sliding esophageal hiatal hernia unchanged.     LIMITED UPPER ABDOMEN: Gallstones     MUSCULOSKELETAL: Negative.     IMPRESSION:   CONCLUSION:  1.  Moderate diffuse emphysema.     2.  Resolution of the previous interstitial infiltrate right upper lobe likely infectious in etiology.     3.  No significant new findings.     4.  Gallstone.     5.  Large sliding esophageal hiatal hernia.    PFT's (4/16/18):  FEV1/FVC is 0.69 and is normal (less than 0.7 but greater than the demographically adjusted lower limit of normal).  FEV1 is 2.14 L (70% predicted) and is reduced.  FVC is 3.12 L (75% predicted) and reduced.  There was improvement in spirometry after a single inhaled dose of bronchodilator.  TLC is 6.66 L (88% predicted) and is normal.  RV is 3.09 L (104% predicted) and is normal.  DLCO is 64% predicted and is reduced when it is corrected for hemoglobin.    Cory Boateng MD  Pulmonary and Critical Care Medicine  [facility]  Cell [phone]  Office [phone]  Pager [phone]

## 2021-06-18 NOTE — PROGRESS NOTES
Dear Dr. Cory Boateng Md  [address]  [address]  [place] [address]    Thank you for the opportunity to participate in the care of Mr. Solitario Benjamin.    He is a 83 y.o. male who comes to the clinic with a chief complaint of excessive daytime sleepiness that is been going on for at least 5 years.  While the patient denies any episodes of witnessed apnea he has been told that he does have loud snoring during sleep.  During a recent hospitalization in January the patient was strongly advised by his pulmonologist to rule out obstructive sleep apnea.  Since the patient already has a diagnosis of COPD, he may suffer from overlap syndrome.  Patient's review of system is significant for sciatic pain which may occasionally wake him up from sleep.     Ideal Sleep-Wake Cycle(devoid of societal pressure):    Patient would try to initiate sleep at around 10 PM with a sleep latency of 30-45 minutes. The patient would have 2-3 nocturnal awakening. Final wake up time is around 5:30-7 AM.    Past Medical History  Past Medical History:   Diagnosis Date     Type 2 diabetes mellitus without complication, without long-term current use of insulin 1/21/2018        Past Surgical History  History reviewed. No pertinent surgical history.     Meds  Current Outpatient Prescriptions   Medication Sig Dispense Refill     acetaminophen (TYLENOL) 325 MG tablet Take 2 tablets (650 mg total) by mouth every 4 (four) hours as needed.  0     albuterol (PROAIR HFA;PROVENTIL HFA;VENTOLIN HFA) 90 mcg/actuation inhaler Inhale 2 puffs every 6 (six) hours as needed for wheezing or shortness of breath. 1 Inhaler 1     aspirin 81 MG EC tablet Take 81 mg by mouth at bedtime.       atorvastatin (LIPITOR) 20 MG tablet Take 20 mg by mouth at bedtime.       dextromethorphan-guaiFENesin (ROBITUSSIN-DM)  mg/5 mL liquid Take 5 mL by mouth every 4 (four) hours as needed.  0     fluticasone (FLONASE) 50 mcg/actuation nasal spray One spray in each  nostril daily 16 g 12     glucosamine sulfate 500 mg cap Take 1,000 mg by mouth daily.       lisinopril (PRINIVIL,ZESTRIL) 20 MG tablet Take 20 mg by mouth daily.       loratadine-pseudoephedrine (CLARITIN-D 24 HOUR)  mg per 24 hr tablet Take 1 tablet by mouth daily. 30 tablet 11     metFORMIN (GLUCOPHAGE) 1000 MG tablet Take 1,000 mg by mouth 2 (two) times a day with meals.       multivitamin therapeutic tablet Take 1 tablet by mouth daily.       OMEGA-3/DHA/EPA/FISH OIL (FISH OIL-OMEGA-3 FATTY ACIDS) 300-1,000 mg capsule Take 2 g by mouth daily.       omeprazole (PRILOSEC) 20 MG capsule Take 20 mg by mouth daily.       predniSONE (DELTASONE) 20 MG tablet Take 1 tablet (20 mg total) by mouth daily with breakfast. 4 tablet 0     umeclidinium-vilanterol (ANORO ELLIPTA) 62.5-25 mcg/actuation inhaler Inhale 1 puff daily. 60 puff 11     vitamin E 400 unit capsule Take 400 Units by mouth daily.       amLODIPine (NORVASC) 5 MG tablet Take 1 tablet (5 mg total) by mouth daily. 30 tablet 0     No current facility-administered medications for this visit.         Allergies  Review of patient's allergies indicates no known allergies.     Social History  Social History     Social History     Marital status:      Spouse name: N/A     Number of children: N/A     Years of education: N/A     Occupational History     Not on file.     Social History Main Topics     Smoking status: Former Smoker     Types: Cigarettes     Quit date: 1983     Smokeless tobacco: Former User     Alcohol use No     Drug use: No     Sexual activity: Not Currently     Other Topics Concern     Not on file     Social History Narrative        Family History  Family History   Problem Relation Age of Onset     Sleep apnea Child         Review of Systems:  Constitutional: Negative except as noted in HPI.   Eyes: Negative except as noted in HPI.   ENT: Negative except as noted in HPI.   Cardiovascular: Negative except as noted in HPI.   Respiratory:  Negative except as noted in HPI.   Gastrointestinal: Negative except as noted in HPI.   Genitourinary: Negative except as noted in HPI.   Musculoskeletal: Negative except as noted in HPI.   Integumentary: Negative except as noted in HPI.   Neurological: Negative except as noted in HPI.   Psychiatric: Negative except as noted in HPI.   Endocrine: Negative except as noted in HPI.   Hematologic/Lymphatic: Negative except as noted in HPI.      STOP BANG 6/5/2018   Do you snore loudly (louder than talking or loud enough to be heard through closed doors)? 1   Do you often feel tired, fatigued, or sleepy during daytime? 1   Has anyone observed you stop breathing in your sleep? 0   Do you have or are you being treated for high blood pressure? 1   BMI more than 35 kg/m2 0   Age over 50 years old? 1   Neck circumference greater than 16 inches? 1   Gender male? 1   Total Score 6   Epworths Sleepiness Scale 6/5/2018   Sitting and reading 2   Watching TV 2   Sitting, inactive in a public place (e.g. a theatre or a meeting) 1   As a passenger in a car for an hour without a break 1   Lying down to rest in the afternoon when circumstances permit 2   Sitting and talking to someone 0   Sitting quietly after a lunch without alcohol 1   In a car, while stopped for a few minutes in traffic 1   Total score 10   Rooming 6/5/2018   Usual bedtime 10   Sleep Latency 30-45 mn   Awakenings 2-3   Wake Up Time 530-7   Energy Drinks 0   Coffee 2-3   Difficulty falling asleep Yes   Difficulty staying asleep Yes   Excessive daytime tiredness Yes   Excessive daytime sleepiness No   Dozing off while driving No   Shift Worker No   Sleep Walking? No   Sleep Talking? No   Kicking or punching? No   Restless legs symptoms No       Physical Exam:  /64  Pulse 92  Ht 6' (1.829 m)  Wt (!) 228 lb 12.8 oz (103.8 kg)  SpO2 94%  BMI 31.03 kg/m2  BMI:Body mass index is 31.03 kg/(m^2).   GEN: NAD, obese  Head: Normocephalic.  EYES: PERRLA, EOMI  ENT:  "Oropharynx is clear, mallampatti class 3+ airway. Uvula is intact  Nasal mucosa is moist without erythema  Neck : Thyroid is within normal limits. Neck circ 17.5\"  CV: Regular rate and rhythm, S1 & S2 positive.  LUNGS: Bilateral breathsounds heard.   ABDOMEN: Positive bowel sounds in all quadrants, soft, no rebound or guarding  MUSCULOSKELETAL: Bilateral trace leg swelling  SKIN: warm, dry, no rashes  Neurological: Alert, oriented to time, place, and person.  Psych: normal mood, normal affect     Labs/Studies:     Lab Results   Component Value Date    WBC 8.7 01/21/2018    HGB 14.7 01/21/2018    HCT 41.7 01/21/2018    MCV 95 01/21/2018     01/21/2018         Chemistry        Component Value Date/Time     01/29/2018 1539    K 4.1 01/29/2018 1539     01/29/2018 1539    CO2 23 01/29/2018 1539    BUN 18 01/29/2018 1539    CREATININE 1.04 01/29/2018 1539     (H) 01/29/2018 1539        Component Value Date/Time    CALCIUM 9.1 01/29/2018 1539    ALKPHOS 63 10/26/2013 0339    AST 18 10/26/2013 0339    ALT 20 10/26/2013 0339    BILITOT 1.5 (H) 10/26/2013 0339            No results found for: FERRITIN  Lab Results   Component Value Date    TSH 3.09 04/30/2018         Assessment and Plan:  In summary Solitario Benjamin is a 83 y.o. year old male here for sleep disturbance.  1.  Hypersomnia   Mr. Solitario Benjamin has high risk for obstructive sleep apnea based on the history of hypersomnia, snoring and a crowded airway. I educated the patient on the underlying pathophysiology of obstructive sleep apnea. We reviewed the risks associated with sleep apnea, including increased cardiovascular risk and overall death. We talked about treatments briefly. I recommend getting an split-night nocturnal polysomnography. The patient should return to the clinic to discuss results and treatment option in a patient-centered approach.  2.  Snoring  3.  Other sleep disturbance  4.  Elevated blood pressure reading  I will have " the patient's blood pressure readings repeated during this clinic visit. I strongly advised the patient to follow up with PCP in one week.    Patient verbalized understanding of these issues, agrees with the plan and all questions were answered today. Patient was given an opportuntity to voice any other symptoms or concerns not listed above. Patient did not have any other symptoms or concerns.      Patient told to return in one week after the sleep study is interpreted.      Massmio Singh DO  Board Certified in Internal Medicine and Sleep Medicine  [facility].    (Note created with Dragon voice recognition and unintended spelling errors and word substitutions may occur)

## 2021-06-18 NOTE — LETTER
Letter by Cory Boateng MD at      Author: Cory Boateng MD Service: -- Author Type: --    Filed:  Encounter Date: 2/12/2019 Status: (Other)       Yogi Saldana MD  [facility] [address]                                  February 12, 2019    Patient: Solitario Benjamin   MR Number: [id number]   YOB: 1935   Date of Visit: 2/12/2019     Dear Dr. Les MD:    I saw Solitario Benjamin today at the [facility]. Below are the relevant portions of my assessment and plan of care.    If you have questions, please do not hesitate to call me.    Sincerely,        Cory Boateng MD          CC  No Recipients  Cory Boateng MD  2/12/2019 12:08 PM  Sign at close encounter  Pulmonary Clinic Follow-up Visit    Assessment and Plan:   83 year old male former smoker with a history of DM2, HTN, dyslipidemia, colon polyp, prostate CA s/p robotic prostatectomy in 2005 and radiation around 2016, cholelithiasis, GERD, large hiatal hernia, hospitalization for pneumonia in January 2018, presenting for follow-up of chronic cough and dyspnea.      Chronic obstructive pulmonary disease, chronic cough:  Emphysema on CT and bronchodilator reversibility on PFTs, though FEV1/FVC ratio is greater than the demographically adjusted lower limit of normal; FEV1 is only mildly decreased. 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  He feels well and stopped his umeclidinium-vilanterol one month ago on his own; does not feel any different without it and would like to stay off of it; also it was costing >$80/month in copayment. Going to [employer] twice per week for exercise. Denies dyspnea. Uses his walker a couple of times per week and able to push the walker loaded with items such as garbage bags or groceries. He is not needing albuterol HFA; cannot remember the last time he used it. Feels improvement in phlegm with OTC guaifenesin, and would like to use OTC nasal  fluticasone and loratadine for sinus congestion rather than prescription. He is doing well with his ASV for severe HECTOR and follows with Dr. Singh for this.     Plan:  -  patient stopped umeclidinium-vilanterol one month ago; will keep it off his list and reinitiate it if needed  - albuterol HFA as needed  - completed pulmonary rehabilitation in [place]  - OTC nasal fluticasone one spray in each nostril daily as needed  -  OTC loratadine 10 mg daily as needed  - continue omeprazole 20 mg daily  - uses OTC guaifenesin for phlegm, which is fine  - continue nocturnal ASV with active sleep medicine follow-up with Dr. Singh; appreciated  - annual influenza vaccination  - up to date on pneumococcal vaccination  - in discussion with the patient and per his preference, further pulmonary clinic follow-up will be scheduled on an as-needed basis; I think this is reasonable  - encouraged him to call any time with questions or concerning symptoms      I appreciate the opportunity to participate in the care of Mr. Benjamin.  Please feel free to contact me at any time.      CCx: COPD, chronic cough, chronic rhinosinusitis, hiatal hernia/PPI, snoring    HPI: 83 year old male former smoker with a history of DM2, HTN, dyslipidemia, colon polyp, prostate CA s/p robotic prostatectomy in 2005 and radiation around 2016, cholelithiasis, GERD, large hiatal hernia, hospitalization for pneumonia in January 2018, presenting for follow-up of chronic cough and dyspnea. Emphysema on CT and bronchodilator reversibility on PFTs, though FEV1/FVC ratio is greater than the demographically adjusted lower limit of normal; FEV1 is only mildly decreased. 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  He feels well and stopped his umeclidinium-vilanterol one month ago on his own; does not feel any different without it and would like to stay off of it; also it was costing >$80/month in copayment. Going to [employer] twice per week  for exercise. Denies dyspnea. Uses his walker a couple of times per week and able to push the walker loaded with items such as garbage bags or groceries. He is not needing albuterol HFA; cannot remember the last time he used it. Feels improvement in phlegm with OTC guaifenesin, and would like to use OTC nasal fluticasone and loratadine for sinus congestion rather than prescription. He is doing well with his ASV for severe HECTOR and follows with Dr. Singh for this.    ROS:  A 12-system review was obtained and was negative with the exception of the symptoms endorsed in the history of present illness.    PMH:  Tobacco dependence in remission  DM2  HTN  COPD  Chronic rhinosinusitis  Daytime hypersomnolence, difficulty sleeping, snoring  DL  Colon polyp  Prostate CA s/p prostatectomy  and radiation around   Severe HECTOR with AHI 80.3/hr on nocturnal ASV    PSH:  No past surgical history on file.    Allergies:  No Known Allergies    Family HX:  Family History   Problem Relation Age of Onset   ? Sleep apnea Child        Social Hx:  Social History     Socioeconomic History   ? Marital status:      Spouse name: Not on file   ? Number of children: Not on file   ? Years of education: Not on file   ? Highest education level: Not on file   Social Needs   ? Financial resource strain: Not on file   ? Food insecurity - worry: Not on file   ? Food insecurity - inability: Not on file   ? Transportation needs - medical: Not on file   ? Transportation needs - non-medical: Not on file   Occupational History   ? Not on file   Tobacco Use   ? Smoking status: Former Smoker     Types: Cigarettes     Last attempt to quit:      Years since quittin.1   ? Smokeless tobacco: Former User   Substance and Sexual Activity   ? Alcohol use: No   ? Drug use: No   ? Sexual activity: Not Currently   Other Topics Concern   ? Not on file   Social History Narrative   ? Not on file       Current Meds:  Current Outpatient Medications    Medication Sig Dispense Refill   ? acetaminophen (TYLENOL) 325 MG tablet Take 2 tablets (650 mg total) by mouth every 4 (four) hours as needed.  0   ? albuterol (PROAIR HFA;PROVENTIL HFA;VENTOLIN HFA) 90 mcg/actuation inhaler Inhale 2 puffs every 6 (six) hours as needed for wheezing or shortness of breath. 1 Inhaler 11   ? amLODIPine (NORVASC) 5 MG tablet Take 1 tablet (5 mg total) by mouth daily. 30 tablet 0   ? aspirin 81 MG EC tablet Take 81 mg by mouth at bedtime.     ? atorvastatin (LIPITOR) 20 MG tablet Take 20 mg by mouth at bedtime.     ? fluticasone (FLONASE ALLERGY RELIEF) 50 mcg/actuation nasal spray One spray in each nostril daily as needed 16 g 12   ? glucosamine sulfate 500 mg cap Take 1,000 mg by mouth daily.     ? lisinopril (PRINIVIL,ZESTRIL) 20 MG tablet Take 20 mg by mouth daily.     ? loratadine 10 mg cap Take 10 mg by mouth daily. 30 each 11   ? metFORMIN (GLUCOPHAGE) 1000 MG tablet Take 1,000 mg by mouth 2 (two) times a day with meals.     ? multivitamin therapeutic tablet Take 1 tablet by mouth daily.     ? OMEGA-3/DHA/EPA/FISH OIL (FISH OIL-OMEGA-3 FATTY ACIDS) 300-1,000 mg capsule Take 2 g by mouth daily.     ? omeprazole (PRILOSEC) 20 MG capsule Take 20 mg by mouth daily.     ? predniSONE (DELTASONE) 20 MG tablet Take 1 tablet (20 mg total) by mouth daily with breakfast. 4 tablet 0   ? vitamin E 400 unit capsule Take 400 Units by mouth daily.       No current facility-administered medications for this visit.        Physical Exam:  /70   Pulse 88   Ht 6' (1.829 m)   Wt (!) 242 lb (109.8 kg)   SpO2 96%   BMI 32.82 kg/m     Gen: alert, oriented, no distress  HEENT: nasal turbinates are unremarkable, no oropharyngeal lesions, no cervical or supraclavicular lymphadenopathy  CV: RRR, no M/G/R  Resp: CTAB, no focal crackles or wheezes  Abd: soft, nontender, no palpable organomegaly  Skin: no apparent rashes  Ext: no cyanosis, clubbing or edema  Neuro: alert,  nonfocal    Labs:  reviewed    Imaging studies:  CT chest (4/9/18):  - images directly reviewed, formal interpretation follows:  FINDINGS:  LUNGS AND PLEURA: Moderate diffuse emphysema with large bulla in the left upper lobe medially unchanged.      Resolution of the subtle interstitial infiltrate posterior right upper lobe since 01/22/2018 likely mild pneumonia. Lungs are now clear of infiltrates.      MEDIASTINUM: No lymphadenopathy. Large sliding esophageal hiatal hernia unchanged.      LIMITED UPPER ABDOMEN: Gallstones      MUSCULOSKELETAL: Negative.      IMPRESSION:   CONCLUSION:  1.  Moderate diffuse emphysema.      2.  Resolution of the previous interstitial infiltrate right upper lobe likely infectious in etiology.      3.  No significant new findings.      4.  Gallstone.      5.  Large sliding esophageal hiatal hernia.     PFT's (4/16/18):  FEV1/FVC is 0.69 and is normal (less than 0.7 but greater than the demographically adjusted lower limit of normal).  FEV1 is 2.14 L (70% predicted) and is reduced.  FVC is 3.12 L (75% predicted) and reduced.  There was improvement in spirometry after a single inhaled dose of bronchodilator.  TLC is 6.66 L (88% predicted) and is normal.  RV is 3.09 L (104% predicted) and is normal.  DLCO is 64% predicted and is reduced when it is corrected for hemoglobin.    Cory Boateng MD  Pulmonary and Critical Care Medicine  [facility]  Cell [phone]  Office [phone]  Pager [phone]

## 2021-06-19 NOTE — PROGRESS NOTES
Order for Durable Medical Equipment was processed and equipment ordered.   DME provider: [employer]  Date Faxed: 07/24/2018  Ordering Provider: Dr. Singh  Equipment ordered: Cpap

## 2021-06-19 NOTE — PROGRESS NOTES
Subjective:      Patient ID: Solitario Benjamin is a 83 y.o. male.    Chief Complaint:    HPI Solitario Benjamin is a 83 y.o. male with PMHx of DM who presents today for evaluation after he had a fall yesterday. Patient has been feeling that his legs are heavy for the past 6 weeks. 2 weeks ago the patient fell and yesterday he fell again.  When he fell yesterday he tripped over a throw rug because he was not lifting his legs enough. 2 weeks ago the patient lost his balance and fell. Patient has stiffness through is lower back after the fall, but no current pain.  He states that about 6 weeks ago he started having some sciatic nerve pain bilaterally.  He denies any known trauma that would cause this.  He historically had some sciatica per patient.  He states that he has numbness on the lateral aspect of both buttocks.  He denies any saddle numbness distribution, enuresis, loss of control of bowel habits.  Denies any lightheadedness or headaches.  6 weeks ago he began using a cane, but he started seeing his chiropractor which quickly started improving his sciatic pain.  2 weeks ago he started needing the cane again after his fall.  Patient states that it feel like there are big weight on the end of his feet.  Patient lives alone in an apartment on the first floor with no steps. Patient denies any injury or head trauma during his fall yesterday; this was a witnessed fall.      Past Medical History:   Diagnosis Date     Type 2 diabetes mellitus without complication, without long-term current use of insulin (H) 1/21/2018       Social History   Substance Use Topics     Smoking status: Former Smoker     Types: Cigarettes     Quit date: 1983     Smokeless tobacco: Former User     Alcohol use No       Review of Systems   Gastrointestinal:        Negative for loss of control of his bowel habits.    Genitourinary: Negative for enuresis.   Musculoskeletal: Positive for back pain and gait problem.   Neurological: Positive for numbness  (of lateral gluteal region bilaterally). Negative for weakness, light-headedness and headaches.       Objective:     /70 (Patient Site: Right Arm, Patient Position: Sitting, Cuff Size: Adult Large)  Pulse 96  Temp 98.1  F (36.7  C) (Oral)   Resp 20  Wt (!) 228 lb (103.4 kg)  SpO2 95%  BMI 30.92 kg/m2    Physical Exam   Constitutional: He appears well-developed and well-nourished. No distress.   HENT:   Head: Normocephalic and atraumatic.   Right Ear: External ear normal.   Left Ear: External ear normal.   Eyes: Conjunctivae are normal.   Cardiovascular: Normal rate, regular rhythm and normal heart sounds.  Exam reveals no gallop and no friction rub.    No murmur heard.  Pulmonary/Chest: Effort normal. No respiratory distress. He has no wheezes. He has no rales.   Musculoskeletal:        Right knee: He exhibits normal range of motion. No tenderness found.        Left knee: No tenderness found.        Lumbar back: Normal. He exhibits no tenderness.   Negative straight leg raise bilaterally.  Numbness of bilateral lateral aspect of gluteal muscle.  Strength maintained in hip flexion knee flexion and extension and ankle plantar flexion.   Neurological: He is alert. He has normal strength. He is not disoriented. He displays no atrophy. No cranial nerve deficit or sensory deficit. He displays a negative Romberg sign. Gait abnormal. Coordination normal. GCS eye subscore is 4. GCS verbal subscore is 5. GCS motor subscore is 6.   Patient's gait is careful but steady.  It is not a shuffling gait.  He  is able to walk without the aid of his cane.   Skin: He is not diaphoretic.   Psychiatric: He has a normal mood and affect. His behavior is normal. Judgment and thought content normal.   Nursing note and vitals reviewed.    Clinical Decision Making:  Physical exam is benign with the exception of decreased sensitivity in bilateral sciatic nerve distribution of the gluteal muscles.  No significant historical findings  indicative of cauda equina syndrome.  Patient's falls  sound consistent with mechanical or sudden weakness/giving out of his legs.  With this neurologic deficit in sciatic distribution I recommend being seen promptly by spine center for further evaluation.  Patient does not exhibit any disequilibrium or abnormal defects indicative of CVA.    Assessment:     Procedures    1. Sciatic nerve pain  Ambulatory referral to Spine Care         Patient Instructions   1. Follow-up with spine center as soon as possible for further evaluation of this sciatic numbness and lower leg weakness.  2.  Seek emergency medical attention if you develop numbness on the inner groin region, loss of control of bowel or bladder function.  Seek emergency medical medical attention if you develop another fall.  Move all items that you are able to trip on off the floor.

## 2021-06-19 NOTE — PROGRESS NOTES
ASSESSMENT: Solitario Benjamin is a 83 y.o. male with past medical history significant for prostate cancer, type 2 diabetes mellitus, hypertension, COPD (on prednisone) who presents today for new patient evaluation of a 6 week history of right low back pain with right lower extremity numbness and weakness.  Pain has improved with chiropractic manipulation, however, numbness and weakness persists.  Patient demonstrated weakness in the right ankle dorsiflexors on exam.  I am concerned that he has right L4 impingement.  He has not had any advanced imaging of the lumbar spine.    KEN: 32  Who 5: 23    PLAN:  A shared decision making model was used.  The patient's values and choices were respected.  The following represents what was discussed and decided upon by the physician assistant and the patient.      1.  DIAGNOSTIC TESTS: Due to the patient's weakness on exam, I have ordered an MRI lumbar spine for further evaluation.    2.  PHYSICAL THERAPY:  Discussed the importance of core strengthening, ROM, stretching exercises with the patient and how each of these entities is important in decreasing pain.  Explained to the patient that the purpose of physical therapy is to teach the patient a home exercise program.  These exercises need to be performed every day in order to decrease pain and prevent future occurrences of pain.  I placed an order for the patient begin physical therapy.    3.  MEDICATIONS: No changes are made to patient's medications.  Patient uses prednisone 20 mg daily for COPD.  He uses Tylenol as needed.  He cannot take NSAIDs given comorbidities.  I chose not to add gabapentin as I do not want to increase patient's risk of falling.  We could consider adding this if symptoms are  not improving.    4.  INTERVENTIONS: No interventions were ordered.  We will await the results of the MRI lumbar spine.  Patient may benefit from interventional pain management, based on those results.  Patient does have significant  weakness.    5.  PATIENT EDUCATION: Patient is in agreement with the above plan.  All questions were answered.    6.  FOLLOW-UP:   A nurse will call the patient with results of his MRI lumbar spine.  At that time I will likely recommend that the patient return to the clinic so that I can recheck her neurologic exam and discuss treatment options.  Alternatively, I may move ahead with ordering an epidural steroid injection if he does have significant right L4 neural impingement, given his weakness on exam.  If he has any questions or concerns in the meantime, he should not hesitate to contact our clinic.      SUBJECTIVE:  Solitario Benjamin  Is a 83 y.o. male who presents today in consultation at the request of Radha Lopez PA-C for new patient evaluation of low back pain and right lower extremity numbness and weakness.  The patient states that he began to have pain on the right side of low back about 6 weeks ago.  He denies any injury or event to cause the pain.  The patient states that he has a history of having similar pain on the left side, but this pain was more severe than any previous episode of left-sided pain that he had.  He went to his chiropractor, who he has been seen for 25 years.  The patient reports that the chiropractic treatment helps alleviate the right low back pain, however, he has had persistent right leg numbness and weakness.  She states that he has had 2 falls as a result of the right leg numbness and weakness.  He does not clearly remember the circumstances of the first fall.  However, he does recall that with the second fall, on June 26, 2018, he was at a friend's house and he did not lift his right foot up high enough to clear a throw rug.  The patient was seen in the walk-in clinic and referred to our clinic for further evaluation and treatment.    Patient states that he does have some mild residual right low back pain.  Pain is in the lower back at the lumbosacral junction.  He denies any  pain radiating further down the leg.  He has numbness which extends into the right buttock and down the right posterolateral thigh to the knee.  He says it does not go distal to the knee.  He feels weak in the right leg.  He states that his right foot drags.  His symptoms are aggravated with walking longer distances.  He states that when he first wakes up in the morning he has poor balance, but that improved as he gets moving.  However, by the end of the day his leg is bad again.  Patient states that when the pain began he was a walker for 2-3 weeks.  As the pain improves, he transitioned using a cane.  His symptoms improved significantly with sitting.  Patient denies any left-sided symptoms.  He denies any loss of bowel or bladder control.  He denies any recent fevers, chills, sweats.  He denies any neck pain or upper extremity pain, numbness, tingling, or weakness.    As mentioned above, patient went to the chiropractor.  This has been helping his pain.  He has never had physical therapy for his lower back.  He is currently in  pulmonary therapy and [place].  He has never had any spine surgery or spine injections.  Patient takes prednisone 20 mg daily for his COPD.  He also uses Tylenol as needed for pain.    Current Outpatient Prescriptions on File Prior to Encounter   Medication Sig Dispense Refill     acetaminophen (TYLENOL) 325 MG tablet Take 2 tablets (650 mg total) by mouth every 4 (four) hours as needed.  0     albuterol (PROAIR HFA;PROVENTIL HFA;VENTOLIN HFA) 90 mcg/actuation inhaler Inhale 2 puffs every 6 (six) hours as needed for wheezing or shortness of breath. 1 Inhaler 1     aspirin 81 MG EC tablet Take 81 mg by mouth at bedtime.       atorvastatin (LIPITOR) 20 MG tablet Take 20 mg by mouth at bedtime.       dextromethorphan-guaiFENesin (ROBITUSSIN-DM)  mg/5 mL liquid Take 5 mL by mouth every 4 (four) hours as needed.  0     fluticasone (FLONASE) 50 mcg/actuation nasal spray One spray in each  nostril daily 16 g 12     glucosamine sulfate 500 mg cap Take 1,000 mg by mouth daily.       lisinopril (PRINIVIL,ZESTRIL) 20 MG tablet Take 20 mg by mouth daily.       loratadine-pseudoephedrine (CLARITIN-D 24 HOUR)  mg per 24 hr tablet Take 1 tablet by mouth daily. 30 tablet 11     metFORMIN (GLUCOPHAGE) 1000 MG tablet Take 1,000 mg by mouth 2 (two) times a day with meals.       multivitamin therapeutic tablet Take 1 tablet by mouth daily.       OMEGA-3/DHA/EPA/FISH OIL (FISH OIL-OMEGA-3 FATTY ACIDS) 300-1,000 mg capsule Take 2 g by mouth daily.       omeprazole (PRILOSEC) 20 MG capsule Take 20 mg by mouth daily.       predniSONE (DELTASONE) 20 MG tablet Take 1 tablet (20 mg total) by mouth daily with breakfast. 4 tablet 0     umeclidinium-vilanterol (ANORO ELLIPTA) 62.5-25 mcg/actuation inhaler Inhale 1 puff daily. 60 puff 11     vitamin E 400 unit capsule Take 400 Units by mouth daily.       amLODIPine (NORVASC) 5 MG tablet Take 1 tablet (5 mg total) by mouth daily. 30 tablet 0         No Known Allergies    Past Medical History:   Diagnosis Date     Type 2 diabetes mellitus without complication, without long-term current use of insulin (H) 1/21/2018      Patient Active Problem List   Diagnosis     PAVAN (acute kidney injury) (H)     Tachycardia     Lactic acid acidosis     Respiratory infection     Accelerated hypertension     Type 2 diabetes mellitus without complication, without long-term current use of insulin (H)     Interstitial pneumonia (H)     Past surgical history: Surgery for prostate cancer 2005, hernia surgery age 19    Family History   Problem Relation Age of Onset     Sleep apnea Child    Father had heart disease    Social history: The patient is .  He lives independently in an apartment.  He is retired.  He worked delivering food to grocery stores.  He quit drinking 35 years ago.  He does not smoke.  He denies illicit drug use.    ROS: Positive for cough, shortness of breath.   Specifically negative for bowel/bladder dysfunction, fevers,chills, appetite changes, unexplained weight loss.   Otherwise 13 systems reviewed are negative.  Please see the patient's intake questionnaire from today for details.      OBJECTIVE:  PHYSICAL EXAMINATION:    CONSTITUTIONAL:  Vital signs as above.  No acute distress.  The patient is well nourished and well groomed.  PSYCHIATRIC:  The patient is awake, alert, oriented to person, place, time and answering questions appropriately with clear speech.    HEENT: Normocephalic, atraumatic.  Sclera clear.  Neck is supple.  SKIN:  Skin over the face, bilateral lower extremities, and posterior torso is clean, dry, intact without rashes.    GAIT:  Gait is guarded but nonantalgic.  The patient is unable to ambulate on heels on the right.  Able to ambulate on toes bilaterally.  STANDING EXAMINATION: Mild tenderness palpation right lower lumbar paraspinous muscles.  Lumbar flexion moderately restricted.  Lumbar extension within normal limits.  Lumbar facet loading maneuvers increased right low back pain.  MUSCLE STRENGTH:  The patient has 4/5 strength right ankle dorsiflexors, otherwise 5/5 strength for the bilateral hip flexors, knee flexors/extensors, left ankle dorsiflexors bilateral ,plantar flexors, great toe extensors.  NEUROLOGICAL: Patient has trace left and absent right patellar reflexes, absent bilateral Achilles reflexes.  Negative Babinski's bilaterally.  No ankle clonus bilaterally.  Subjective diminished/altered sensation right posterior lateral thigh.  VASCULAR:  1/4 dorsalis pedis pulses bilaterally.  Bilateral lower extremities are warm.  There is no pitting edema of the bilateral lower extremities.  ABDOMINAL:  Soft, non-distended, non-tender throughout all quadrants.  No pulsatile mass palpated in the left lower quadrant.  LYMPH NODES:  No palpable or tender inguinal lymph nodes.  MUSCULOSKELETAL: Straight leg raise negative bilaterally.  Patient has  slight restriction with internal and external rotation of the right hip with increased hip pain.

## 2021-06-19 NOTE — PROGRESS NOTES
[facility] Daily Progress     Patient Name: Solitario Benjamin  Date: 2018  Visit #:   Referring Provider: Julianna De La Rosa CNP  Referring Diagnosis: Right lumbar radiculopathy  Visit Diagnosis:     ICD-10-CM    1. Acute right lumbar radiculopathy M54.16    2. Generalized muscle weakness M62.81    3. Decreased range of motion of trunk and back R29.898    4. Abnormality of gait R26.9        Assessment:     Pt demo's great improvements over past week with no R LE numbness remaining and return to gym with minimal pain complaints.    Patient is benefitting from skilled physical therapy and is making steady progress toward functional goals.  Patient is appropriate to continue with skilled physical therapy intervention, as indicated by initial plan of care.    Goal Status:  Pt. will demonstrate/verbalize independence in self-management of condition in : 12 weeks - IMPROVING    Pt. will be able to walk : 20 minutes;30 minutes;with less pain;with less difficulty;for exercise/recreation;for community mobility;in 12 weeks - IMPROVING - 20 min on treadmill at gym last week    Patient will ascend / descend: stairs;with recipricol gait;with less pain;with less difficulty;in 12 weeks - Did stairs yesterday and legs felt a little tired but better    Patient will transfer: sit/stand;for car;for toileting;for in/out of bed;for in/out of chair;with less pain;with less difficulty;in 12 weeks - IMPROVING      Plan / Patient Education:     Continue with initial plan of care.   Assess response to new HEP and reassess R LE sensation and strength and trunk ROM.   Add strength for gym routine if needed and discharge if doing well.      Subjective:     Pain Ratin  Pt reports feeling better! Pt hasn't used his SPC for 1 week. Pt working on his exercises and feeling good with them.   Pt is done with respiratory therapy and has returned to [employer]. He is doing the treadmill up to 20 minutes at 2.5 mph at 0.5 incline   Pt  reports he has noticed weakness with trying to get back to weight machine.   Pt reports not feeling any numbness in the leg anymore and no more pain in the hip.     Lower Extremity Functional Scale (_/80): 38 - FROM INITIAL      Objective:      60-70%  bpm for appropriate HR at gym with aerobic training    No increased pain with new strengthening exercises      Treatment Today      TREATMENT MINUTES COMMENTS   Evaluation     Self-care/ Home management     Manual therapy     Neuromuscular Re-education     Therapeutic Activity     Therapeutic Exercises 28 Discussed goals and progress. Reviewed, performed and added to HEP and gym routine and printed instructions for home.   Gait training     Modality__________________                Total     Blank areas are intentional and mean the treatment did not include these items.       Tatianna Smiley PT, DPT, OCS, CLT  8/2/2018  12:01 PM

## 2021-06-19 NOTE — PROGRESS NOTES
[facility] Certification Request    July 19, 2018      Patient: Solitario Benjamin  MR Number: [id number]  YOB: 1935  Date of Visit: 7/19/2018      Dear Julianna De La Rosa PA-C    Thank you for this referral.   We are seeing Solitario Benjamin for Physical Therapy of R lumbar radiculopathy.    Medicare and/or Medicaid requires physician review and approval of the treatment plan. Please review the plan of care and verify that you agree with the therapy plan of care by co-signing this note.      Plan of Care  Authorization / Certification Start Date: 07/19/18  Authorization / Certification End Date: 10/17/18  Authorization / Certification Number of Visits: 12   Communication with: Referral Source  Patient Related Instruction: Nature of Condition;Treatment plan and rationale;Self Care instruction;Basis of treatment;Body mechanics;Posture;Precautions;Next steps;Expected outcome  Times per Week: 1  Number of Weeks: 12  Number of Visits: 12  Discharge Planning: when indicated  Precautions / Restrictions : none  Therapeutic Exercise: ROM;Stretching;Strengthening  Neuromuscular Reeducation: posture;TNE;core;other  Neuromuscular Re-education: neurodynamics  Manual Therapy: soft tissue mobilization;joint mobilization;muscle energy  Functional Training (ADL's): self care;ADL's      Goals:  Pt. will demonstrate/verbalize independence in self-management of condition in : 12 weeks  Pt. will be able to walk : 20 minutes;30 minutes;with less pain;with less difficulty;for exercise/recreation;for community mobility;in 12 weeks  Patient will ascend / descend: stairs;with recipricol gait;with less pain;with less difficulty;in 12 weeks  Patient will transfer: sit/stand;for car;for toileting;for in/out of bed;for in/out of chair;with less pain;with less difficulty;in 12 weeks      If you have any questions or concerns, please don't hesitate to call.    Sincerely,      Tatianna Smiley, PT, DPT, OCS, CLT        Physician  recommendation:     ___ Follow therapist's recommendation        ___ Modify therapy      *Physician co-signature indicates they certify the need for these services furnished within this plan and while under their care.        [facility]   Initial Evaluation    Patient Name: Solitario Benjamin  Date of evaluation: 7/19/2018  Visit number: 1/12  Referring Provider: Julianna De La Rosa PA-C  Referring Diagnosis: Right Lumbar Radiculopathy  Visit Diagnosis:     ICD-10-CM    1. Acute right lumbar radiculopathy M54.16    2. Generalized muscle weakness M62.81        Assessment:      Solitario Benjamin is a 83 y.o. male who presents to therapy today with chief complaints of R sciatica with weakness and numbness. Onset date of sx was April 2018.  Pt reported h/o L LE sciatica and recent diagnosis of COPD and emphysema.  Pain symptoms are mild at this time but numbness and weakness continue.  Functional impairments include difficulty and mild pain with walking, standing, getting up from sitting, stairs and balance.  Pt demo's signs and sx consistent with R lumbar radiculopathy with R hip and leg weakness and decreased LE sensation with decreased trunk flexibility.     Pt. is appropriate for skilled PT intervention as outlined in the Plan of Care (POC).  Pt. is a good candidate for skilled PT services to improve pain levels and function.    Goals:  Pt. will demonstrate/verbalize independence in self-management of condition in : 12 weeks  Pt. will be able to walk : 20 minutes;30 minutes;with less pain;with less difficulty;for exercise/recreation;for community mobility;in 12 weeks  Patient will ascend / descend: stairs;with recipricol gait;with less pain;with less difficulty;in 12 weeks  Patient will transfer: sit/stand;for car;for toileting;for in/out of bed;for in/out of chair;with less pain;with less difficulty;in 12 weeks    Patient's expectations/goals are realistic.    Barriers to Learning or Achieving Goals:  No  Barriers.       Plan / Patient Instructions:      Plan for next visit: Assess response to SLR, bridge, SKTC nerve glide and sit to stand. Assess return to [employer].   Attempt progression of nerve glide and core/trunk strength.    Plan of Care:   Authorization / Certification Start Date: 07/19/18  Authorization / Certification End Date: 10/17/18  Authorization / Certification Number of Visits: 12   Communication with: Referral Source  Patient Related Instruction: Nature of Condition;Treatment plan and rationale;Self Care instruction;Basis of treatment;Body mechanics;Posture;Precautions;Next steps;Expected outcome  Times per Week: 1  Number of Weeks: 12  Number of Visits: 12  Discharge Planning: when indicated  Precautions / Restrictions : none  Therapeutic Exercise: ROM;Stretching;Strengthening  Neuromuscular Reeducation: posture;TNE;core;other  Neuromuscular Re-education: neurodynamics  Manual Therapy: soft tissue mobilization;joint mobilization;muscle energy  Functional Training (ADL's): self care;ADL's        Treatment techniques, plan of care, and goals were discussed with the patient.  The patient agrees to the plan as outlined.  The plan of care is dynamic and will be modified on an ongoing basis.       Subjective:       Social information:   Occupation:Off road truck sales   Work Status:NA - retired    History of Present Illness:    Social - Pt lives in an apartment by himself in senior living.    Pt reports he was in the hospital in January 2018 for shortness of breath. He was found to have COPD and emphysema.  Pt reports he has been doing pulmonary rehab and will finish in the next week. Pt also has h/o shoulder injuries without surgeries.    Pt reports h/o L sided sciatica in the past.    Pt reports having R sided sciatica for the past 2 months. Pain was bad initially and pt attended chiropractic treatment and pain has mostly resolved - minor ache left - but there is numbness in the buttock and back  of the leg and B leg weakness. Pt was walking with a walker at first and then a cane and now has been walking without an A.D. But his R leg tends to walk off at an angle.  Heating pad helps with flexibility.    Pt will start back at [employer] when he finishes pulmonary rehab.   Pt goes to the chiropractor every 2 weeks.     Pain Ratin  Pain rating at best: 0  Pain rating at worst: 5  Pain description: numbness, soreness and weakness    Patient reports benefit from:  rest         Objective:      Patient Outcome Measures :    Lower Extremity Functional Scale (_/80): 38   Scores range from 0-80, where a score of 80 represents maximum function. The minimal clinically important difference is a positive change of 9 points.    Precautions/Restrictions: None  Involved side: Right  Posture Observation:      General sitting posture is  fair.    Gait: Amb with R trendelenberg > L and mod lateral path deviations with LOB on R side.    Palpation: Not assessed today.    ROM: Trunk flex mod limited without pain, ext mod limited with numbness in leg, B side bend equal and min limited but tightness and pain on R low back    Strength: R hip flexor 3/5 without pain, L hip flexor 4+/5, L quad 5/5, R quad 4/5, L hamstring 5/5, R hamstring 4+/5, L DF 5/5, R DF 3+/5, B EHL 5/5, R PF 3+/5, L PF 4/5  Toe and heel walking able to do but very fatiguing.    Sensation: Intact to light touch B LE L3-S1 dermatomes, diminished per pt L1-2 R    Special tests: Repeated extension negative for increased R LE sx  Negative B hip testing for reproduction of pain  Positive R SLR ~60       Treatment Today      TREATMENT MINUTES COMMENTS   Evaluation 25 Lumbar   Self-care/ Home management     Manual therapy     Neuromuscular Re-education     Therapeutic Activity     Therapeutic Exercises 25 Discussed eval findings and POC. HEP instruction per Patient Instructions with written handout given.   Gait training     Modality__________________                 Total 50    Blank areas are intentional and mean the treatment did not include these items.        PT Evaluation Code: (Please list factors)  Patient History/Comorbidities: COPD and emphysema  Examination: R LE/lumbar  Clinical Presentation: stable  Clinical Decision Making: low    Patient History/  Comorbidities Examination  (body structures and functions, activity limitations, and/or participation restrictions) Clinical Presentation Clinical Decision Making (Complexity)   No documented Comorbidities or personal factors 1-2 Elements Stable and/or uncomplicated Low   1-2 documented comorbidities or personal factor 3 Elements Evolving clinical presentation with changing characteristics Moderate   3-4 documented comorbidities or personal factors 4 or more Unstable and unpredictable High       Tatianna Smiley PT, DPT, OCS, CLT  7/19/2018  9:54 AM

## 2021-06-19 NOTE — PROGRESS NOTES
Pulmonary Clinic Follow-up Visit    Assessment and Plan:   83 year old male former smoker with a history of DM2, HTN, dyslipidemia, colon polyp, prostate CA s/p robotic prostatectomy in 2005 and radiation around 2016, cholelithiasis, GERD, large hiatal hernia, hospitalization for pneumonia in January 2018, presenting for follow-up of chronic cough and dyspnea.      Chronic obstructive pulmonary disease, chronic cough:  Emphysema on CT and bronchodilator reversibility on PFTs, though FEV1/FVC ratio is greater than the demographically adjusted lower limit of normal.  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  Noted improved exercise tolerance after completing pulmonary rehab last week, and starting PT for sciatica, which has led to improvement.  Minimal cough.  Hardly ever needing to use albuterol.  Humidity causes worse dyspnea, but with milder weather the last few days has improved.  Has had significant xerostomia with the loratadine-pseudoephedrine.  Started on CPAP via oronasal mask with recent diagnosis of severe HECTOR (AHI 80.3) and has active follow-up in the sleep medicine clinic; started on CPAP via oronasal mask.     Plan:  - continue umeclidinium-vilanterol one inhalation daily  - albuterol HFA as needed  - completed pulmonary rehabilitation in [place]  - continue nasal fluticasone one spray in each nostril daily  - stop loratadine-pseudoephedrine  - start loratadine alone, 10 mg daily  - continue omeprazole 20 mg daily  - continue CPAP with active sleep medicine follow-up with Dr. Singh; appreciated  - annual influenza vaccination  - up to date on pneumococcal vaccination  - follow up in 6 months or sooner if needed  - encouraged him to call any time with questions or concerning symptoms      I appreciate the opportunity to participate in the care of Mr. Benjamin.  Please feel free to contact me at any time.      CCx: COPD, chronic cough, chronic rhinosinusitis, hiatal hernia/PPI,  snoring    HPI: 83 year old male former smoker with a history of DM2, HTN, dyslipidemia, colon polyp, prostate CA s/p robotic prostatectomy in 2005 and radiation around 2016, cholelithiasis, GERD, large hiatal hernia, hospitalization for pneumonia in January 2018, presenting for follow-up of chronic cough and dyspnea. Emphysema on CT and bronchodilator reversibility on PFTs, though FEV1/FVC ratio is greater than the demographically adjusted lower limit of normal.  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  Noted improved exercise tolerance after completing pulmonary rehab last week, and starting PT for sciatica, which has led to improvement.  Minimal cough.  Hardly ever needing to use albuterol.  Humidity causes worse dyspnea, but with milder weather the last few days has improved.  Has had significant xerostomia with the loratadine-pseudoephedrine.  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    ROS:  A 12-system review was obtained and was negative with the exception of the symptoms endorsed in the history of present illness.    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    Allergies:  No Known Allergies    Family HX:  Family History   Problem Relation Age of Onset     Sleep apnea Child        Social Hx:  Social History     Social History     Marital status:      Spouse name: N/A     Number of children: N/A     Years of education: N/A     Occupational History     Not on file.     Social History Main Topics     Smoking status: Former Smoker     Types: Cigarettes     Quit date: 1983     Smokeless tobacco: Former User     Alcohol use No     Drug use: No      Sexual activity: Not Currently     Other Topics Concern     Not on file     Social History Narrative       Current Meds:  Current Outpatient Prescriptions   Medication Sig Dispense Refill     acetaminophen (TYLENOL) 325 MG tablet Take 2 tablets (650 mg total) by mouth every 4 (four) hours as needed.  0     albuterol (PROAIR HFA;PROVENTIL HFA;VENTOLIN HFA) 90 mcg/actuation inhaler Inhale 2 puffs every 6 (six) hours as needed for wheezing or shortness of breath. 1 Inhaler 1     aspirin 81 MG EC tablet Take 81 mg by mouth at bedtime.       atorvastatin (LIPITOR) 20 MG tablet Take 20 mg by mouth at bedtime.       dextromethorphan-guaiFENesin (ROBITUSSIN-DM)  mg/5 mL liquid Take 5 mL by mouth every 4 (four) hours as needed.  0     fluticasone (FLONASE) 50 mcg/actuation nasal spray One spray in each nostril daily 16 g 12     glucosamine sulfate 500 mg cap Take 1,000 mg by mouth daily.       lisinopril (PRINIVIL,ZESTRIL) 20 MG tablet Take 20 mg by mouth daily.       metFORMIN (GLUCOPHAGE) 1000 MG tablet Take 1,000 mg by mouth 2 (two) times a day with meals.       multivitamin therapeutic tablet Take 1 tablet by mouth daily.       OMEGA-3/DHA/EPA/FISH OIL (FISH OIL-OMEGA-3 FATTY ACIDS) 300-1,000 mg capsule Take 2 g by mouth daily.       omeprazole (PRILOSEC) 20 MG capsule Take 20 mg by mouth daily.       predniSONE (DELTASONE) 20 MG tablet Take 1 tablet (20 mg total) by mouth daily with breakfast. 4 tablet 0     umeclidinium-vilanterol (ANORO ELLIPTA) 62.5-25 mcg/actuation inhaler Inhale 1 puff daily. 60 puff 11     vitamin E 400 unit capsule Take 400 Units by mouth daily.       amLODIPine (NORVASC) 5 MG tablet Take 1 tablet (5 mg total) by mouth daily. 30 tablet 0     loratadine 10 mg cap Take 10 mg by mouth daily. 30 each 11     No current facility-administered medications for this visit.        Physical Exam:  /66  Pulse 92  Resp 24  Wt (!) 230 lb 9.6 oz (104.6 kg)  SpO2 95% Comment: RA  BMI 31.27  kg/m2  Gen: alert, oriented, no distress  HEENT: nasal turbinates are unremarkable, no oropharyngeal lesions, no cervical or supraclavicular lymphadenopathy  CV: tachy, regular, no M/G/R  Resp: moderately diminished bilaterally, no focal crackles or wheezes  Abd: soft, nontender, no palpable organomegaly  Skin: no apparent rashes  Ext: no cyanosis, clubbing or edema  Neuro: alert, nonfocal    Labs:  reviewed    Imaging studies:  CT chest (4/9/18):  - images directly reviewed, formal interpretation follows:  FINDINGS:  LUNGS AND PLEURA: Moderate diffuse emphysema with large bulla in the left upper lobe medially unchanged.      Resolution of the subtle interstitial infiltrate posterior right upper lobe since 01/22/2018 likely mild pneumonia. Lungs are now clear of infiltrates.      MEDIASTINUM: No lymphadenopathy. Large sliding esophageal hiatal hernia unchanged.      LIMITED UPPER ABDOMEN: Gallstones      MUSCULOSKELETAL: Negative.      IMPRESSION:   CONCLUSION:  1.  Moderate diffuse emphysema.      2.  Resolution of the previous interstitial infiltrate right upper lobe likely infectious in etiology.      3.  No significant new findings.      4.  Gallstone.      5.  Large sliding esophageal hiatal hernia.     PFT's (4/16/18):  FEV1/FVC is 0.69 and is normal (less than 0.7 but greater than the demographically adjusted lower limit of normal).  FEV1 is 2.14 L (70% predicted) and is reduced.  FVC is 3.12 L (75% predicted) and reduced.  There was improvement in spirometry after a single inhaled dose of bronchodilator.  TLC is 6.66 L (88% predicted) and is normal.  RV is 3.09 L (104% predicted) and is normal.  DLCO is 64% predicted and is reduced when it is corrected for hemoglobin.    Cory Boateng MD  Pulmonary and Critical Care Medicine  [facility]  Cell [phone]  Office [phone]  Pager [phone]

## 2021-06-20 NOTE — PROGRESS NOTES
Dear [place] [address],    Thank you for the opportunity to participate in the care of Solitario Benjamin.     He is a 83 y.o.  male patient who comes to the sleep medicine clinic for review of his sleep study and compliance download data. The original sleep study study was completed on 06/26/18 which showed the the patient had severe obstructive sleep apnea as well as central sleep apnea.  Patient also exhibited periodic limb movement sleep.  Due to the severity the patient's sleep disordered breathing he was invited back to return for titration study on 07/19/18.  This study showed that neither CPAP or bilevel therapy were effective in treating his sleep disordered breathing.  I therefore informed the patient of the results and offered him the option of getting the paperwork started to initiate pressure therapy with adaptive servo ventilation.  Since starting ASV he reports that his sleep quality and energy level have both dramatically improved.  He no longer naps during the day.    Compliance Download data for 30 days:  Pressure setting: ASV  Residual AHI: 25.5 events per hour  Leak: Minimal  Compliance: 60%  Mask Tolerance: Good  Skin irritation: None      Current Outpatient Prescriptions   Medication Sig Dispense Refill     acetaminophen (TYLENOL) 325 MG tablet Take 2 tablets (650 mg total) by mouth every 4 (four) hours as needed.  0     albuterol (PROAIR HFA;PROVENTIL HFA;VENTOLIN HFA) 90 mcg/actuation inhaler Inhale 2 puffs every 6 (six) hours as needed for wheezing or shortness of breath. 1 Inhaler 1     amLODIPine (NORVASC) 5 MG tablet Take 1 tablet (5 mg total) by mouth daily. 30 tablet 0     aspirin 81 MG EC tablet Take 81 mg by mouth at bedtime.       atorvastatin (LIPITOR) 20 MG tablet Take 20 mg by mouth at bedtime.       dextromethorphan-guaiFENesin (ROBITUSSIN-DM)  mg/5 mL liquid Take 5 mL by mouth every 4 (four) hours as needed.  0      fluticasone (FLONASE) 50 mcg/actuation nasal spray One spray in each nostril daily 16 g 12     glucosamine sulfate 500 mg cap Take 1,000 mg by mouth daily.       lisinopril (PRINIVIL,ZESTRIL) 20 MG tablet Take 20 mg by mouth daily.       loratadine 10 mg cap Take 10 mg by mouth daily. 30 each 11     metFORMIN (GLUCOPHAGE) 1000 MG tablet Take 1,000 mg by mouth 2 (two) times a day with meals.       multivitamin therapeutic tablet Take 1 tablet by mouth daily.       OMEGA-3/DHA/EPA/FISH OIL (FISH OIL-OMEGA-3 FATTY ACIDS) 300-1,000 mg capsule Take 2 g by mouth daily.       omeprazole (PRILOSEC) 20 MG capsule Take 20 mg by mouth daily.       predniSONE (DELTASONE) 20 MG tablet Take 1 tablet (20 mg total) by mouth daily with breakfast. 4 tablet 0     umeclidinium-vilanterol (ANORO ELLIPTA) 62.5-25 mcg/actuation inhaler Inhale 1 puff daily. 60 puff 11     vitamin E 400 unit capsule Take 400 Units by mouth daily.       No current facility-administered medications for this visit.        No Known Allergies    Physical Exam:  /67  Pulse 85  Ht 6' (1.829 m)  Wt (!) 232 lb (105.2 kg)  SpO2 96%  BMI 31.46 kg/m2  BMI:Body mass index is 31.46 kg/(m^2).   GEN: NAD, obese  Psych: normal mood, normal affect     Labs/Studies:  - We reviewed the results of the overnight PSG as described on the HPI.     Lab Results   Component Value Date    WBC 8.7 01/21/2018    HGB 14.7 01/21/2018    HCT 41.7 01/21/2018    MCV 95 01/21/2018     01/21/2018         Chemistry        Component Value Date/Time     01/29/2018 1539    K 4.1 01/29/2018 1539     01/29/2018 1539    CO2 23 01/29/2018 1539    BUN 18 01/29/2018 1539    CREATININE 1.04 01/29/2018 1539     (H) 01/29/2018 1539        Component Value Date/Time    CALCIUM 9.1 01/29/2018 1539    ALKPHOS 63 10/26/2013 0339    AST 18 10/26/2013 0339    ALT 20 10/26/2013 0339    BILITOT 1.5 (H) 10/26/2013 0339            No results found for: FERRITIN        Assessment  and Plan:  In summary Solitario Benjamin is a 83 y.o. year old male here for compliance download review.  1. Obstructive Sleep Apnea/central sleep apnea  I reviewed the results of the patient's sleep study with him in detail.  We also reviewed his compliance download data and I encouraged him to try to increase his hours of usage and so far as he can.  I also recommend that he discuss with his durable medical equipment company to try to switch his [employer] account to me so we can get a compliance download data.  2.  Periodic limb movement sleep  Most likely will improve after optimal pressure therapy    Recommend the patient return to clinic in 8 weeks and to bring his machine with him.     Patient verbalized understanding of these issues, agrees with the plan and all questions were answered today. Patient was given an opportuntity to voice any other symptoms or concerns not listed above. Patient did not have any other symptoms or concerns.        Massimo Singh DO  Board Certified in Internal Medicine and Sleep Medicine  [facility].    We spent a total of 25 minutes of face-to-face encounter and more than 50% of the encounter was used for counseling or coordination of care.    (Note created with Dragon voice recognition and unintended spelling errors and word substitutions may occur)

## 2021-06-20 NOTE — PROGRESS NOTES
[facility] Daily Progress     Patient Name: Solitario Benjamin  Date: 2018  Visit #: 3/12  Referring Provider: Julianna De La Rosa CNP  Referring Diagnosis: Right lumbar radiculopathy  Visit Diagnosis:     ICD-10-CM    1. Acute right lumbar radiculopathy M54.16    2. Generalized muscle weakness M62.81    3. Decreased range of motion of trunk and back R29.898    4. Abnormality of gait R26.9        Assessment:     Pt demo's great improvements over past month to R LE and trunk strength, endurance with walking and no pain sx remaining with goals mostly met.    HEP/POC compliance is  good .  Patient demonstrates understanding/independence with home program.  Response to Intervention Great!    Goal Status:  Pt. will demonstrate/verbalize independence in self-management of condition in : 12 weeks - MET    Pt. will be able to walk : 20 minutes;30 minutes;with less pain;with less difficulty;for exercise/recreation;for community mobility;in 12 weeks - IMPROVING    Patient will ascend / descend: stairs;with recipricol gait;with less pain;with less difficulty;in 12 weeks - MET    Patient will transfer: sit/stand;for car;for toileting;for in/out of bed;for in/out of chair;with less pain;with less difficulty;in 12 weeks - MET      Plan / Patient Education:     Discharge to  with HEP and gym routine.  Pt advised to continue HEP long term and contact PT if any future questions or concerns come up.  Thank you for this referral!      Subjective:     Pain Ratin   Pt reports going to the gym 3 x/week and pain sx are doing well.   Pt is doing the treadmill with 2.0 mph at 0.5 to 6.0 incline for 15 minutes.  PLOF was incline of 9 with 3.0 mph.  Pt reports having no numbness with R sciatica and LE anymore. Weight machines are feeling easier to do.  No more A.D. With walking    Lower Extremity Functional Scale (_/80): 38 - FROM INITIAL  TODAY 57/80      Objective:     Sensation intact to light touch B LE all dermatomes (L1-2 was  slightly diminished)    R hip flexor strength 4+/5 (was 3/5), R DF 4+/5 (was 3+/5), R PF 5/5 (was 3+/5)    Amb with mild R trendelenberg with straighter path - less lateral deviation to the R    No pain with trunk ROM - min limited with stiffness ext and B SB (was mod)    Able to perform HEP with fatigue only      Treatment Today      TREATMENT MINUTES COMMENTS   Evaluation     Self-care/ Home management     Manual therapy     Neuromuscular Re-education     Therapeutic Activity     Therapeutic Exercises 28 Discussed goals and progress. Reassessed LE strength and trunk ROM. Reviewed HEP and gym routine.   Gait training     Modality__________________                Total 28    Blank areas are intentional and mean the treatment did not include these items.       Tatianna Smiley PT, DPT, OCS, CLT  8/23/2018  10:40 AM

## 2021-06-21 NOTE — PROGRESS NOTES
Dear MD [place] [address],    Thank you for the opportunity to participate in the care of Solitario Benjamin.     He is a 83 y.o. y/o male patient who comes to the sleep medicine clinic for follow up.  Since the patient's last clinical visit with me, his ASV usage has actually decreased. He complains of dry mouth associated with usage. [employer] still has not switched his account over to us.    Compliance Download data for 30 Days:  Pressure setting:ASV  Residual AHI:25 events per hour  Leak: Minimal  Compliance:11%  Mask Tolerance:Good  Skin irritation:None      Past Medical History:   Diagnosis Date     Type 2 diabetes mellitus without complication, without long-term current use of insulin (H) 1/21/2018       No past surgical history on file.    Social History     Social History     Marital status:      Spouse name: N/A     Number of children: N/A     Years of education: N/A     Occupational History     Not on file.     Social History Main Topics     Smoking status: Former Smoker     Types: Cigarettes     Quit date: 1983     Smokeless tobacco: Former User     Alcohol use No     Drug use: No     Sexual activity: Not Currently     Other Topics Concern     Not on file     Social History Narrative         Current Outpatient Prescriptions   Medication Sig Dispense Refill     acetaminophen (TYLENOL) 325 MG tablet Take 2 tablets (650 mg total) by mouth every 4 (four) hours as needed.  0     albuterol (PROAIR HFA;PROVENTIL HFA;VENTOLIN HFA) 90 mcg/actuation inhaler Inhale 2 puffs every 6 (six) hours as needed for wheezing or shortness of breath. 1 Inhaler 1     aspirin 81 MG EC tablet Take 81 mg by mouth at bedtime.       atorvastatin (LIPITOR) 20 MG tablet Take 20 mg by mouth at bedtime.       dextromethorphan-guaiFENesin (ROBITUSSIN-DM)  mg/5 mL liquid Take 5 mL by mouth every 4 (four) hours as needed.  0     fluticasone (FLONASE) 50 mcg/actuation  nasal spray One spray in each nostril daily 16 g 12     glucosamine sulfate 500 mg cap Take 1,000 mg by mouth daily.       lisinopril (PRINIVIL,ZESTRIL) 20 MG tablet Take 20 mg by mouth daily.       loratadine 10 mg cap Take 10 mg by mouth daily. 30 each 11     metFORMIN (GLUCOPHAGE) 1000 MG tablet Take 1,000 mg by mouth 2 (two) times a day with meals.       multivitamin therapeutic tablet Take 1 tablet by mouth daily.       OMEGA-3/DHA/EPA/FISH OIL (FISH OIL-OMEGA-3 FATTY ACIDS) 300-1,000 mg capsule Take 2 g by mouth daily.       omeprazole (PRILOSEC) 20 MG capsule Take 20 mg by mouth daily.       predniSONE (DELTASONE) 20 MG tablet Take 1 tablet (20 mg total) by mouth daily with breakfast. 4 tablet 0     umeclidinium-vilanterol (ANORO ELLIPTA) 62.5-25 mcg/actuation inhaler Inhale 1 puff daily. 60 puff 11     vitamin E 400 unit capsule Take 400 Units by mouth daily.       amLODIPine (NORVASC) 5 MG tablet Take 1 tablet (5 mg total) by mouth daily. 30 tablet 0     No current facility-administered medications for this visit.        No Known Allergies    Physical Exam:  /70  Pulse 84  Wt (!) 232 lb (105.2 kg)  SpO2 95%  BMI 31.46 kg/m2  BMI:Body mass index is 31.46 kg/(m^2).   GEN: NAD, obese  Psych: normal mood, normal affect    Labs/Studies:     I reviewed the efficacy and compliance report from his device. Data summarized on the HPI and the compliance flow sheet.     Lab Results   Component Value Date    WBC 8.7 01/21/2018    HGB 14.7 01/21/2018    HCT 41.7 01/21/2018    MCV 95 01/21/2018     01/21/2018         Chemistry        Component Value Date/Time     01/29/2018 1539    K 4.1 01/29/2018 1539     01/29/2018 1539    CO2 23 01/29/2018 1539    BUN 18 01/29/2018 1539    CREATININE 1.04 01/29/2018 1539     (H) 01/29/2018 1539        Component Value Date/Time    CALCIUM 9.1 01/29/2018 1539    ALKPHOS 63 10/26/2013 0339    AST 18 10/26/2013 0339    ALT 20 10/26/2013 0339    BILITOT  1.5 (H) 10/26/2013 0339            No results found for: FERRITIN         Assessment and Plan:  In summary Solitario Benjamin is a 83 y.o. year old male who is here for compliance download review.    1. Obstructive sleep apnea/Central sleep apnea  I educated the patient on the importance of meeting compliance ASAP. I recommended the patient proceed with pressure desensitization protocol and will give him a handout on the topic. I will keep him on the same pressure range.  2. Other sleep disturbance     Patient verbalized understanding of these issues, agrees with the plan and all questions were answered today. Patient was given an opportuntity to voice any other symptoms or concerns not listed above. Patient did not have any other symptoms or concerns.      Patient told to return in 6 weeks. Patient instructed to stop at  to schedule appointment before leaving today.    Massimo Singh DO  Board Certified in Internal Medicine and Sleep Medicine  [facility].    I spent a total of 15 minutes of face-to-face encounter and more than 50% of the encounter was used for counseling or coordination of care.    (Note created with Dragon voice recognition and unintended spelling errors and word substitutions may occur)

## 2021-06-22 NOTE — PROGRESS NOTES
Dear Dr. Saldana, [place] [address],    Thank you for the opportunity to participate in the care of Solitario Benjamin.     He is a 83 y.o. y/o male patient who comes to the sleep medicine clinic for follow up.  Since the patient's last clinical visit with me, he has followed my advice and try to increase his hours of usage on the machine.  He reports that he is less sleepy during the day prior to the last clinical visit.  And not since he will try to continue with sustained usage of his ASV.    Compliance Download data for 30 days:  Pressure setting: SV  Residual AHI: 10 events per hour  Leak: Minimal  Compliance: 97%  Mask Tolerance: Good  Skin irritation: None    Past Medical History:   Diagnosis Date     Type 2 diabetes mellitus without complication, without long-term current use of insulin (H) 2018       No past surgical history on file.    Social History     Socioeconomic History     Marital status:      Spouse name: Not on file     Number of children: Not on file     Years of education: Not on file     Highest education level: Not on file   Social Needs     Financial resource strain: Not on file     Food insecurity - worry: Not on file     Food insecurity - inability: Not on file     Transportation needs - medical: Not on file     Transportation needs - non-medical: Not on file   Occupational History     Not on file   Tobacco Use     Smoking status: Former Smoker     Types: Cigarettes     Last attempt to quit: 1983     Years since quittin.9     Smokeless tobacco: Former User   Substance and Sexual Activity     Alcohol use: No     Drug use: No     Sexual activity: Not Currently   Other Topics Concern     Not on file   Social History Narrative     Not on file       Current Outpatient Medications   Medication Sig Dispense Refill     acetaminophen (TYLENOL) 325 MG tablet Take 2 tablets (650 mg total) by mouth every 4 (four) hours as needed.  0      albuterol (PROAIR HFA;PROVENTIL HFA;VENTOLIN HFA) 90 mcg/actuation inhaler Inhale 2 puffs every 6 (six) hours as needed for wheezing or shortness of breath. 1 Inhaler 1     aspirin 81 MG EC tablet Take 81 mg by mouth at bedtime.       atorvastatin (LIPITOR) 20 MG tablet Take 20 mg by mouth at bedtime.       dextromethorphan-guaiFENesin (ROBITUSSIN-DM)  mg/5 mL liquid Take 5 mL by mouth every 4 (four) hours as needed.  0     fluticasone (FLONASE) 50 mcg/actuation nasal spray One spray in each nostril daily 16 g 12     glucosamine sulfate 500 mg cap Take 1,000 mg by mouth daily.       lisinopril (PRINIVIL,ZESTRIL) 20 MG tablet Take 20 mg by mouth daily.       loratadine 10 mg cap Take 10 mg by mouth daily. 30 each 11     metFORMIN (GLUCOPHAGE) 1000 MG tablet Take 1,000 mg by mouth 2 (two) times a day with meals.       multivitamin therapeutic tablet Take 1 tablet by mouth daily.       OMEGA-3/DHA/EPA/FISH OIL (FISH OIL-OMEGA-3 FATTY ACIDS) 300-1,000 mg capsule Take 2 g by mouth daily.       omeprazole (PRILOSEC) 20 MG capsule Take 20 mg by mouth daily.       predniSONE (DELTASONE) 20 MG tablet Take 1 tablet (20 mg total) by mouth daily with breakfast. 4 tablet 0     umeclidinium-vilanterol (ANORO ELLIPTA) 62.5-25 mcg/actuation inhaler Inhale 1 puff daily. 60 puff 11     vitamin E 400 unit capsule Take 400 Units by mouth daily.       amLODIPine (NORVASC) 5 MG tablet Take 1 tablet (5 mg total) by mouth daily. 30 tablet 0     No current facility-administered medications for this visit.        No Known Allergies    Physical Exam:  /64   Pulse 80   Ht 6' (1.829 m)   Wt (!) 242 lb (109.8 kg)   SpO2 94%   BMI 32.82 kg/m    BMI:Body mass index is 32.82 kg/m .   GEN: NAD, obese  Psych: normal mood, normal affect    Labs/Studies:     I reviewed the efficacy and compliance report from his device. Data summarized on the HPI and the compliance flow sheet.     Lab Results   Component Value Date    WBC 8.7  01/21/2018    HGB 14.7 01/21/2018    HCT 41.7 01/21/2018    MCV 95 01/21/2018     01/21/2018         Chemistry        Component Value Date/Time     01/29/2018 1539    K 4.1 01/29/2018 1539     01/29/2018 1539    CO2 23 01/29/2018 1539    BUN 18 01/29/2018 1539    CREATININE 1.04 01/29/2018 1539     (H) 01/29/2018 1539        Component Value Date/Time    CALCIUM 9.1 01/29/2018 1539    ALKPHOS 63 10/26/2013 0339    AST 18 10/26/2013 0339    ALT 20 10/26/2013 0339    BILITOT 1.5 (H) 10/26/2013 0339            No results found for: FERRITIN         Assessment and Plan:  In summary Solitario Benjamin is a 83 y.o. year old male who is here for compliance download.    1.  Obstructive/central sleep apnea  I congratulated the patient on his excellent ASV usage.  I will keep him on the same pressure settings for now.  2.  Other sleep disturbance     Patient verbalized understanding of these issues, agrees with the plan and all questions were answered today. Patient was given an opportuntity to voice any other symptoms or concerns not listed above. Patient did not have any other symptoms or concerns.      Patient told to return in 24 months. Patient instructed to stop at  to schedule appointment before leaving today.    Massimo Singh DO  Board Certified in Internal Medicine and Sleep Medicine  [facility].    I spent a total of 5 minutes of face-to-face encounter and more than 50% of the encounter was used for counseling or coordination of care.    (Note created with Dragon voice recognition and unintended spelling errors and word substitutions may occur)

## 2021-06-24 NOTE — PATIENT INSTRUCTIONS - HE
It was good to see you in clinic today. This is what we discussed:    1. Stay off of the Anoro at this point.  2. Continue to stay active with exercise.  3. Use nasal fluticasone (Flonase) one spray in each nostril daily as needed.  4. Use loratadine (Claritin) 10 mg daily as needed for nasal and sinus congestion.  5. Use Mucinex as needed for phlegm.  6. Continue omeprazole 20 mg daily.  7. Follow up with me as needed.  8. Call any time with questions or concerning symptoms.    Cory Boateng MD  Pulmonary and Critical Care Medicine  [facility]  Office [phone]

## 2021-06-24 NOTE — PROGRESS NOTES
Pulmonary Clinic Follow-up Visit    Assessment and Plan:   83 year old male former smoker with a history of DM2, HTN, dyslipidemia, colon polyp, prostate CA s/p robotic prostatectomy in 2005 and radiation around 2016, cholelithiasis, GERD, large hiatal hernia, hospitalization for pneumonia in January 2018, presenting for follow-up of chronic cough and dyspnea.      Chronic obstructive pulmonary disease, chronic cough:  Emphysema on CT and bronchodilator reversibility on PFTs, though FEV1/FVC ratio is greater than the demographically adjusted lower limit of normal; FEV1 is only mildly decreased. 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 He feels well and stopped his umeclidinium-vilanterol one month ago on his own; does not feel any different without it and would like to stay off of it; also it was costing >$80/month in copayment. Going to [employer] twice per week for exercise. Denies dyspnea. Uses his walker a couple of times per week and able to push the walker loaded with items such as garbage bags or groceries. He is not needing albuterol HFA; cannot remember the last time he used it. Feels improvement in phlegm with OTC guaifenesin, and would like to use OTC nasal fluticasone and loratadine for sinus congestion rather than prescription. He is doing well with his ASV for severe HECTOR and follows with Dr. Singh for this.     Plan:  - patient stopped umeclidinium-vilanterol one month ago; will keep it off his list and reinitiate it if needed  - albuterol HFA as needed  - completed pulmonary rehabilitation in [place]  - OTC nasal fluticasone one spray in each nostril daily as needed  - OTC loratadine 10 mg daily as needed  - continue omeprazole 20 mg daily  - uses OTC guaifenesin for phlegm, which is fine  - continue nocturnal ASV with active sleep medicine follow-up with Dr. Singh; appreciated  - annual influenza vaccination  - up to date on pneumococcal vaccination  - in discussion  with the patient and per his preference, further pulmonary clinic follow-up will be scheduled on an as-needed basis; I think this is reasonable  - encouraged him to call any time with questions or concerning symptoms      I appreciate the opportunity to participate in the care of Mr. Benjamin.  Please feel free to contact me at any time.      CCx: COPD, chronic cough, chronic rhinosinusitis, hiatal hernia/PPI, snoring    HPI: 83 year old male former smoker with a history of DM2, HTN, dyslipidemia, colon polyp, prostate CA s/p robotic prostatectomy in 2005 and radiation around 2016, cholelithiasis, GERD, large hiatal hernia, hospitalization for pneumonia in January 2018, presenting for follow-up of chronic cough and dyspnea. Emphysema on CT and bronchodilator reversibility on PFTs, though FEV1/FVC ratio is greater than the demographically adjusted lower limit of normal; FEV1 is only mildly decreased. 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 He feels well and stopped his umeclidinium-vilanterol one month ago on his own; does not feel any different without it and would like to stay off of it; also it was costing >$80/month in copayment. Going to [employer] twice per week for exercise. Denies dyspnea. Uses his walker a couple of times per week and able to push the walker loaded with items such as garbage bags or groceries. He is not needing albuterol HFA; cannot remember the last time he used it. Feels improvement in phlegm with OTC guaifenesin, and would like to use OTC nasal fluticasone and loratadine for sinus congestion rather than prescription. He is doing well with his ASV for severe HECTOR and follows with Dr. Singh for this.    ROS:  A 12-system review was obtained and was negative with the exception of the symptoms endorsed in the history of present illness.    PMH:  Tobacco dependence in remission  DM2  HTN  COPD  Chronic rhinosinusitis  Daytime hypersomnolence, difficulty sleeping,  snoring  DL  Colon polyp  Prostate CA s/p prostatectomy  and radiation around   Severe HECTOR with AHI 80.3/hr on nocturnal ASV    PSH:  No past surgical history on file.    Allergies:  No Known Allergies    Family HX:  Family History   Problem Relation Age of Onset     Sleep apnea Child        Social Hx:  Social History     Socioeconomic History     Marital status:      Spouse name: Not on file     Number of children: Not on file     Years of education: Not on file     Highest education level: Not on file   Social Needs     Financial resource strain: Not on file     Food insecurity - worry: Not on file     Food insecurity - inability: Not on file     Transportation needs - medical: Not on file     Transportation needs - non-medical: Not on file   Occupational History     Not on file   Tobacco Use     Smoking status: Former Smoker     Types: Cigarettes     Last attempt to quit:      Years since quittin.1     Smokeless tobacco: Former User   Substance and Sexual Activity     Alcohol use: No     Drug use: No     Sexual activity: Not Currently   Other Topics Concern     Not on file   Social History Narrative     Not on file       Current Meds:  Current Outpatient Medications   Medication Sig Dispense Refill     acetaminophen (TYLENOL) 325 MG tablet Take 2 tablets (650 mg total) by mouth every 4 (four) hours as needed.  0     albuterol (PROAIR HFA;PROVENTIL HFA;VENTOLIN HFA) 90 mcg/actuation inhaler Inhale 2 puffs every 6 (six) hours as needed for wheezing or shortness of breath. 1 Inhaler 11     amLODIPine (NORVASC) 5 MG tablet Take 1 tablet (5 mg total) by mouth daily. 30 tablet 0     aspirin 81 MG EC tablet Take 81 mg by mouth at bedtime.       atorvastatin (LIPITOR) 20 MG tablet Take 20 mg by mouth at bedtime.       fluticasone (FLONASE ALLERGY RELIEF) 50 mcg/actuation nasal spray One spray in each nostril daily as needed 16 g 12     glucosamine sulfate 500 mg cap Take 1,000 mg by mouth daily.        lisinopril (PRINIVIL,ZESTRIL) 20 MG tablet Take 20 mg by mouth daily.       loratadine 10 mg cap Take 10 mg by mouth daily. 30 each 11     metFORMIN (GLUCOPHAGE) 1000 MG tablet Take 1,000 mg by mouth 2 (two) times a day with meals.       multivitamin therapeutic tablet Take 1 tablet by mouth daily.       OMEGA-3/DHA/EPA/FISH OIL (FISH OIL-OMEGA-3 FATTY ACIDS) 300-1,000 mg capsule Take 2 g by mouth daily.       omeprazole (PRILOSEC) 20 MG capsule Take 20 mg by mouth daily.       predniSONE (DELTASONE) 20 MG tablet Take 1 tablet (20 mg total) by mouth daily with breakfast. 4 tablet 0     vitamin E 400 unit capsule Take 400 Units by mouth daily.       No current facility-administered medications for this visit.        Physical Exam:  /70   Pulse 88   Ht 6' (1.829 m)   Wt (!) 242 lb (109.8 kg)   SpO2 96%   BMI 32.82 kg/m    Gen: alert, oriented, no distress  HEENT: nasal turbinates are unremarkable, no oropharyngeal lesions, no cervical or supraclavicular lymphadenopathy  CV: RRR, no M/G/R  Resp: CTAB, no focal crackles or wheezes  Abd: soft, nontender, no palpable organomegaly  Skin: no apparent rashes  Ext: no cyanosis, clubbing or edema  Neuro: alert, nonfocal    Labs:  reviewed    Imaging studies:  CT chest (4/9/18):  - images directly reviewed, formal interpretation follows:  FINDINGS:  LUNGS AND PLEURA: Moderate diffuse emphysema with large bulla in the left upper lobe medially unchanged.      Resolution of the subtle interstitial infiltrate posterior right upper lobe since 01/22/2018 likely mild pneumonia. Lungs are now clear of infiltrates.      MEDIASTINUM: No lymphadenopathy. Large sliding esophageal hiatal hernia unchanged.      LIMITED UPPER ABDOMEN: Gallstones      MUSCULOSKELETAL: Negative.      IMPRESSION:   CONCLUSION:  1.  Moderate diffuse emphysema.      2.  Resolution of the previous interstitial infiltrate right upper lobe likely infectious in etiology.      3.  No significant new  findings.      4.  Gallstone.      5.  Large sliding esophageal hiatal hernia.     PFT's (4/16/18):  FEV1/FVC is 0.69 and is normal (less than 0.7 but greater than the demographically adjusted lower limit of normal).  FEV1 is 2.14 L (70% predicted) and is reduced.  FVC is 3.12 L (75% predicted) and reduced.  There was improvement in spirometry after a single inhaled dose of bronchodilator.  TLC is 6.66 L (88% predicted) and is normal.  RV is 3.09 L (104% predicted) and is normal.  DLCO is 64% predicted and is reduced when it is corrected for hemoglobin.    Cory Boateng MD  Pulmonary and Critical Care Medicine  [facility]  Cell [phone]  Office [phone]  Pager [phone]

## 2022-04-01 ENCOUNTER — APPOINTMENT (OUTPATIENT)
Dept: RADIOLOGY | Facility: HOSPITAL | Age: 87
DRG: 368 | End: 2022-04-01
Attending: INTERNAL MEDICINE
Payer: COMMERCIAL

## 2022-04-01 ENCOUNTER — APPOINTMENT (OUTPATIENT)
Dept: CT IMAGING | Facility: HOSPITAL | Age: 87
DRG: 368 | End: 2022-04-01
Attending: EMERGENCY MEDICINE
Payer: COMMERCIAL

## 2022-04-01 ENCOUNTER — HOSPITAL ENCOUNTER (INPATIENT)
Facility: HOSPITAL | Age: 87
LOS: 5 days | Discharge: NURSING, REHAB OR LONG-TERM CARE | DRG: 368 | End: 2022-04-06
Attending: EMERGENCY MEDICINE | Admitting: INTERNAL MEDICINE
Payer: COMMERCIAL

## 2022-04-01 ENCOUNTER — APPOINTMENT (OUTPATIENT)
Dept: CT IMAGING | Facility: HOSPITAL | Age: 87
DRG: 368 | End: 2022-04-01
Attending: INTERNAL MEDICINE
Payer: COMMERCIAL

## 2022-04-01 DIAGNOSIS — R79.89 ELEVATED TROPONIN LEVEL NOT DUE TO ACUTE CORONARY SYNDROME: ICD-10-CM

## 2022-04-01 DIAGNOSIS — J43.2 CENTRILOBULAR EMPHYSEMA (H): ICD-10-CM

## 2022-04-01 DIAGNOSIS — I50.23 ACUTE ON CHRONIC SYSTOLIC HEART FAILURE (H): ICD-10-CM

## 2022-04-01 DIAGNOSIS — D62 ACUTE BLOOD LOSS ANEMIA: Primary | ICD-10-CM

## 2022-04-01 DIAGNOSIS — K59.00 CONSTIPATION, UNSPECIFIED CONSTIPATION TYPE: ICD-10-CM

## 2022-04-01 DIAGNOSIS — R58 ECCHYMOSIS OF FOREARM: ICD-10-CM

## 2022-04-01 DIAGNOSIS — R06.09 DYSPNEA ON EXERTION: ICD-10-CM

## 2022-04-01 DIAGNOSIS — K21.01 GASTROESOPHAGEAL REFLUX DISEASE WITH ESOPHAGITIS AND HEMORRHAGE: ICD-10-CM

## 2022-04-01 DIAGNOSIS — D64.9 SYMPTOMATIC ANEMIA: ICD-10-CM

## 2022-04-01 DIAGNOSIS — R52 PAIN: ICD-10-CM

## 2022-04-01 PROBLEM — I10 ESSENTIAL HYPERTENSION: Status: ACTIVE | Noted: 2022-04-01

## 2022-04-01 PROBLEM — R06.02 SOB (SHORTNESS OF BREATH): Status: ACTIVE | Noted: 2022-04-01

## 2022-04-01 LAB
ABO/RH(D): NORMAL
ALBUMIN SERPL-MCNC: 3.4 G/DL (ref 3.5–5)
ALP SERPL-CCNC: 62 U/L (ref 45–120)
ALT SERPL W P-5'-P-CCNC: 13 U/L (ref 0–45)
ANION GAP SERPL CALCULATED.3IONS-SCNC: 12 MMOL/L (ref 5–18)
ANTIBODY SCREEN: NEGATIVE
APTT PPP: 30 SECONDS (ref 22–38)
AST SERPL W P-5'-P-CCNC: 25 U/L (ref 0–40)
BASOPHILS # BLD AUTO: 0 10E3/UL (ref 0–0.2)
BASOPHILS NFR BLD AUTO: 0 %
BILIRUB DIRECT SERPL-MCNC: 0.3 MG/DL
BILIRUB SERPL-MCNC: 1 MG/DL (ref 0–1)
BLD PROD TYP BPU: NORMAL
BLOOD COMPONENT TYPE: NORMAL
BNP SERPL-MCNC: 383 PG/ML (ref 0–93)
BUN SERPL-MCNC: 21 MG/DL (ref 8–28)
CALCIUM SERPL-MCNC: 8.9 MG/DL (ref 8.5–10.5)
CHLORIDE BLD-SCNC: 106 MMOL/L (ref 98–107)
CO2 SERPL-SCNC: 18 MMOL/L (ref 22–31)
CODING SYSTEM: NORMAL
CREAT SERPL-MCNC: 1.22 MG/DL (ref 0.7–1.3)
CROSSMATCH: NORMAL
D DIMER PPP FEU-MCNC: 0.79 UG/ML FEU (ref 0–0.5)
EOSINOPHIL # BLD AUTO: 0 10E3/UL (ref 0–0.7)
EOSINOPHIL NFR BLD AUTO: 0 %
ERYTHROCYTE [DISTWIDTH] IN BLOOD BY AUTOMATED COUNT: 22.6 % (ref 10–15)
GFR SERPL CREATININE-BSD FRML MDRD: 58 ML/MIN/1.73M2
GLUCOSE BLD-MCNC: 135 MG/DL (ref 70–125)
GLUCOSE BLDC GLUCOMTR-MCNC: 120 MG/DL (ref 70–99)
GLUCOSE BLDC GLUCOMTR-MCNC: 145 MG/DL (ref 70–99)
HBA1C MFR BLD: 5.7 %
HCT VFR BLD AUTO: 16.7 % (ref 40–53)
HGB BLD-MCNC: 4.6 G/DL (ref 13.3–17.7)
HOLD SPECIMEN: NORMAL
IMM GRANULOCYTES # BLD: 0 10E3/UL
IMM GRANULOCYTES NFR BLD: 0 %
INR PPP: 1.24 (ref 0.9–1.15)
ISSUE DATE AND TIME: NORMAL
ISSUE DATE AND TIME: NORMAL
LDH SERPL L TO P-CCNC: 179 U/L (ref 125–220)
LYMPHOCYTES # BLD AUTO: 1 10E3/UL (ref 0.8–5.3)
LYMPHOCYTES NFR BLD AUTO: 12 %
MAGNESIUM SERPL-MCNC: 2 MG/DL (ref 1.8–2.6)
MCH RBC QN AUTO: 17.4 PG (ref 26.5–33)
MCHC RBC AUTO-ENTMCNC: 27.5 G/DL (ref 31.5–36.5)
MCV RBC AUTO: 63 FL (ref 78–100)
MONOCYTES # BLD AUTO: 0.6 10E3/UL (ref 0–1.3)
MONOCYTES NFR BLD AUTO: 8 %
NEUTROPHILS # BLD AUTO: 6.5 10E3/UL (ref 1.6–8.3)
NEUTROPHILS NFR BLD AUTO: 80 %
NRBC # BLD AUTO: 0 10E3/UL
NRBC BLD AUTO-RTO: 0 /100
PLATELET # BLD AUTO: 258 10E3/UL (ref 150–450)
POTASSIUM BLD-SCNC: 3.9 MMOL/L (ref 3.5–5)
PROT SERPL-MCNC: 5.9 G/DL (ref 6–8)
RBC # BLD AUTO: 2.65 10E6/UL (ref 4.4–5.9)
SODIUM SERPL-SCNC: 136 MMOL/L (ref 136–145)
SPECIMEN EXPIRATION DATE: NORMAL
TROPONIN I SERPL-MCNC: 0.13 NG/ML (ref 0–0.29)
TSH SERPL DL<=0.005 MIU/L-ACNC: 6.98 UIU/ML (ref 0.3–5)
UNIT ABO/RH: NORMAL
UNIT NUMBER: NORMAL
UNIT STATUS: NORMAL
UNIT TYPE ISBT: 5100
URATE SERPL-MCNC: 4.8 MG/DL (ref 3–8)
WBC # BLD AUTO: 8.1 10E3/UL (ref 4–11)

## 2022-04-01 PROCEDURE — 99285 EMERGENCY DEPT VISIT HI MDM: CPT | Mod: 25

## 2022-04-01 PROCEDURE — 84443 ASSAY THYROID STIM HORMONE: CPT | Performed by: INTERNAL MEDICINE

## 2022-04-01 PROCEDURE — 85730 THROMBOPLASTIN TIME PARTIAL: CPT | Performed by: EMERGENCY MEDICINE

## 2022-04-01 PROCEDURE — 85610 PROTHROMBIN TIME: CPT | Performed by: EMERGENCY MEDICINE

## 2022-04-01 PROCEDURE — 250N000011 HC RX IP 250 OP 636: Performed by: INTERNAL MEDICINE

## 2022-04-01 PROCEDURE — C9113 INJ PANTOPRAZOLE SODIUM, VIA: HCPCS | Performed by: INTERNAL MEDICINE

## 2022-04-01 PROCEDURE — 70450 CT HEAD/BRAIN W/O DYE: CPT

## 2022-04-01 PROCEDURE — 84550 ASSAY OF BLOOD/URIC ACID: CPT | Performed by: INTERNAL MEDICINE

## 2022-04-01 PROCEDURE — 82248 BILIRUBIN DIRECT: CPT | Performed by: EMERGENCY MEDICINE

## 2022-04-01 PROCEDURE — 71275 CT ANGIOGRAPHY CHEST: CPT

## 2022-04-01 PROCEDURE — P9016 RBC LEUKOCYTES REDUCED: HCPCS | Performed by: EMERGENCY MEDICINE

## 2022-04-01 PROCEDURE — 84484 ASSAY OF TROPONIN QUANT: CPT | Performed by: EMERGENCY MEDICINE

## 2022-04-01 PROCEDURE — 86901 BLOOD TYPING SEROLOGIC RH(D): CPT | Performed by: EMERGENCY MEDICINE

## 2022-04-01 PROCEDURE — 86923 COMPATIBILITY TEST ELECTRIC: CPT | Performed by: EMERGENCY MEDICINE

## 2022-04-01 PROCEDURE — 82607 VITAMIN B-12: CPT | Performed by: INTERNAL MEDICINE

## 2022-04-01 PROCEDURE — 83010 ASSAY OF HAPTOGLOBIN QUANT: CPT | Performed by: INTERNAL MEDICINE

## 2022-04-01 PROCEDURE — 83036 HEMOGLOBIN GLYCOSYLATED A1C: CPT | Performed by: INTERNAL MEDICINE

## 2022-04-01 PROCEDURE — 85025 COMPLETE CBC W/AUTO DIFF WBC: CPT | Performed by: EMERGENCY MEDICINE

## 2022-04-01 PROCEDURE — 74177 CT ABD & PELVIS W/CONTRAST: CPT

## 2022-04-01 PROCEDURE — 83735 ASSAY OF MAGNESIUM: CPT | Performed by: INTERNAL MEDICINE

## 2022-04-01 PROCEDURE — 83880 ASSAY OF NATRIURETIC PEPTIDE: CPT | Performed by: EMERGENCY MEDICINE

## 2022-04-01 PROCEDURE — 87635 SARS-COV-2 COVID-19 AMP PRB: CPT | Performed by: EMERGENCY MEDICINE

## 2022-04-01 PROCEDURE — 73030 X-RAY EXAM OF SHOULDER: CPT | Mod: LT

## 2022-04-01 PROCEDURE — 83615 LACTATE (LD) (LDH) ENZYME: CPT | Performed by: INTERNAL MEDICINE

## 2022-04-01 PROCEDURE — 210N000001 HC R&B IMCU HEART CARE

## 2022-04-01 PROCEDURE — 99223 1ST HOSP IP/OBS HIGH 75: CPT | Performed by: INTERNAL MEDICINE

## 2022-04-01 PROCEDURE — 93005 ELECTROCARDIOGRAM TRACING: CPT | Performed by: EMERGENCY MEDICINE

## 2022-04-01 PROCEDURE — 85379 FIBRIN DEGRADATION QUANT: CPT | Performed by: EMERGENCY MEDICINE

## 2022-04-01 PROCEDURE — 250N000013 HC RX MED GY IP 250 OP 250 PS 637: Performed by: INTERNAL MEDICINE

## 2022-04-01 PROCEDURE — 36415 COLL VENOUS BLD VENIPUNCTURE: CPT | Performed by: EMERGENCY MEDICINE

## 2022-04-01 PROCEDURE — 86923 COMPATIBILITY TEST ELECTRIC: CPT | Performed by: HOSPITALIST

## 2022-04-01 PROCEDURE — C9803 HOPD COVID-19 SPEC COLLECT: HCPCS

## 2022-04-01 PROCEDURE — 80053 COMPREHEN METABOLIC PANEL: CPT | Performed by: EMERGENCY MEDICINE

## 2022-04-01 RX ORDER — NICOTINE POLACRILEX 4 MG
15-30 LOZENGE BUCCAL
Status: DISCONTINUED | OUTPATIENT
Start: 2022-04-01 | End: 2022-04-06 | Stop reason: HOSPADM

## 2022-04-01 RX ORDER — IOPAMIDOL 755 MG/ML
75 INJECTION, SOLUTION INTRAVASCULAR ONCE
Status: COMPLETED | OUTPATIENT
Start: 2022-04-01 | End: 2022-04-01

## 2022-04-01 RX ORDER — ALBUTEROL SULFATE 90 UG/1
2 AEROSOL, METERED RESPIRATORY (INHALATION) EVERY 6 HOURS PRN
Status: DISCONTINUED | OUTPATIENT
Start: 2022-04-01 | End: 2022-04-06 | Stop reason: HOSPADM

## 2022-04-01 RX ORDER — MULTIVIT-MIN/IRON/FOLIC ACID/K 18-600-40
25 CAPSULE ORAL DAILY
Status: ON HOLD | COMMUNITY
End: 2022-04-02

## 2022-04-01 RX ORDER — DEXTROSE MONOHYDRATE 25 G/50ML
25-50 INJECTION, SOLUTION INTRAVENOUS
Status: DISCONTINUED | OUTPATIENT
Start: 2022-04-01 | End: 2022-04-06 | Stop reason: HOSPADM

## 2022-04-01 RX ORDER — IBUPROFEN 200 MG
200 TABLET ORAL DAILY
Status: ON HOLD | COMMUNITY
End: 2022-04-05

## 2022-04-01 RX ORDER — AMOXICILLIN 250 MG
1 CAPSULE ORAL 2 TIMES DAILY
Status: DISCONTINUED | OUTPATIENT
Start: 2022-04-01 | End: 2022-04-06 | Stop reason: HOSPADM

## 2022-04-01 RX ORDER — LORATADINE 10 MG/1
10 TABLET ORAL DAILY
Status: DISCONTINUED | OUTPATIENT
Start: 2022-04-02 | End: 2022-04-05

## 2022-04-01 RX ORDER — ALBUTEROL SULFATE 0.83 MG/ML
2.5 SOLUTION RESPIRATORY (INHALATION) EVERY 6 HOURS PRN
Status: DISCONTINUED | OUTPATIENT
Start: 2022-04-01 | End: 2022-04-06 | Stop reason: HOSPADM

## 2022-04-01 RX ORDER — LIDOCAINE 40 MG/G
CREAM TOPICAL
Status: DISCONTINUED | OUTPATIENT
Start: 2022-04-01 | End: 2022-04-06 | Stop reason: HOSPADM

## 2022-04-01 RX ORDER — POLYETHYLENE GLYCOL 3350 17 G/17G
17 POWDER, FOR SOLUTION ORAL DAILY PRN
Status: DISCONTINUED | OUTPATIENT
Start: 2022-04-01 | End: 2022-04-06 | Stop reason: HOSPADM

## 2022-04-01 RX ADMIN — IOPAMIDOL 75 ML: 755 INJECTION, SOLUTION INTRAVENOUS at 20:40

## 2022-04-01 RX ADMIN — DOCUSATE SODIUM 50 MG AND SENNOSIDES 8.6 MG 1 TABLET: 8.6; 5 TABLET, FILM COATED ORAL at 22:37

## 2022-04-01 RX ADMIN — PANTOPRAZOLE SODIUM 40 MG: 40 INJECTION, POWDER, FOR SOLUTION INTRAVENOUS at 22:04

## 2022-04-01 ASSESSMENT — ENCOUNTER SYMPTOMS
SHORTNESS OF BREATH: 1
LIGHT-HEADEDNESS: 0
ABDOMINAL PAIN: 0
MYALGIAS: 0
BRUISES/BLEEDS EASILY: 0
COLOR CHANGE: 1
DIZZINESS: 0
VOMITING: 0
NAUSEA: 0
HEADACHES: 0

## 2022-04-01 ASSESSMENT — ACTIVITIES OF DAILY LIVING (ADL)
WEAR_GLASSES_OR_BLIND: YES
TOILETING_ISSUES: YES
NUMBER_OF_TIMES_PATIENT_HAS_FALLEN_WITHIN_LAST_SIX_MONTHS: 1
ADLS_ACUITY_SCORE: 12
DIFFICULTY_COMMUNICATING: NO
FALL_HISTORY_WITHIN_LAST_SIX_MONTHS: YES
ADLS_ACUITY_SCORE: 12
EQUIPMENT_CURRENTLY_USED_AT_HOME: WALKER, STANDARD
DRESSING/BATHING_DIFFICULTY: NO
ADLS_ACUITY_SCORE: 12
TOILETING_MANAGEMENT: ASSIST
WALKING_OR_CLIMBING_STAIRS_DIFFICULTY: NO
VISION_MANAGEMENT: GLASSES
TOILETING_ASSISTANCE: TOILETING DIFFICULTY, ASSISTANCE 1 PERSON
TOILETING: 1-->ASSISTANCE (EQUIPMENT/PERSON) NEEDED
CHANGE_IN_FUNCTIONAL_STATUS_SINCE_ONSET_OF_CURRENT_ILLNESS/INJURY: YES
DOING_ERRANDS_INDEPENDENTLY_DIFFICULTY: YES
CONCENTRATING,_REMEMBERING_OR_MAKING_DECISIONS_DIFFICULTY: YES
DIFFICULTY_EATING/SWALLOWING: NO
TOILETING: 0-->NOT TOILET TRAINED OR ASSISTANCE NEEDED (DEVELOPMENTALLY APPROPRIATE)

## 2022-04-01 NOTE — ED PROVIDER NOTES
ED Provider In Triage Note  [facility]  Encounter Date: Apr 1, 2022    Chief Complaint   Patient presents with     Arm Pain     Shortness of Breath       Brief HPI:   Solitario Benjamin is a 86 year old male presenting to the Emergency Department with a chief complaint of nontrauamtric bruising and swelling to left upper arm. SOB. No blood thinners. ALVARADO. Follows with [facility].     Brief Physical Exam:  /53   Pulse 88   Temp 97.7  F (36.5  C) (Oral)   Resp 18   Ht 1.829 m (6')   SpO2 94%   BMI 32.82 kg/m    General: Non-toxic appearing  HEENT: Atraumatic  Resp: No respiratory distress  Abdomen: Non-peritoneal  Neuro: Alert, oriented, answers questions appropriately  Psych: Behavior appropriate  Extremity: bruising to left upper extremity    Plan Initiated in Triage:  Orders Placed This Encounter     US Lower Extremity Venous Duplex Left     D dimer quantitative     Comprehensive metabolic panel     B-Type Natriuretic Peptide ([facility] Only)     Troponin I (now)     INR     PTT       PIT Dispo:   Return to lobby while awaiting workup and ED bed availability    Terrell Chao MD on 4/1/2022 at 5:34 PM    Patient was evaluated by the Physician in Triage due to a limitation of available rooms in the Emergency Department. A plan of care was discussed based on the information obtained on the initial evaluation and patient was consuled to return back to the Emergency Department lobby after this initial evalutaiton until results were obtained or a room became available in the Emergency Department. Patient was counseled not to leave prior to receiving the results of their workup.        Terrell Chao MD  04/01/22 [id number]

## 2022-04-01 NOTE — ED PROVIDER NOTES
EMERGENCY DEPARTMENT ENCOUNTER      NAME: Solitario Benjamin  AGE: 86 year old male  YOB: 1935  MRN: [id number]  EVALUATION DATE & TIME: 4/1/2022  6:15 PM    PCP: Yogi Saldana    ED PROVIDER: Jacques Oliveira M.D.      Chief Complaint   Patient presents with     Arm Pain     Shortness of Breath         FINAL IMPRESSION:  1. Symptomatic anemia    2. Ecchymosis of forearm    3. Dyspnea on exertion          ED COURSE & MEDICAL DECISION MAKING:    Pertinent Labs & Imaging studies reviewed. (See chart for details)  86 year old male presents to the Emergency Department for evaluation of 86-year-old male with a history of hypertension but otherwise noncontributory history presenting with dyspnea on exertion and nontraumatic bruising of his left arm.  He does have ecchymosis of the left arm, but I was more concerned with his skin appearance; he was extremely pale and nearly yellow appearing here.  Concern for acute anemia and possible hematologic/oncology origin.  No apoorva melanotic stools, patient does describe occasional dark brown stools.  No other areas of bleeding.  Labs obtained as critically low hemoglobin of 4.6, but normal platelets and relatively unremarkable otherwise.  Patient was verbally consented for transfusion, 3 units were ordered.  Discussed with hospitalist, who recommended CT chest abdomen pelvis to evaluate for evidence of oncologic problems.  Patient hemodynamically stable here.  Admitted to the hospitalist.          6:36PM I met with the patient for the initial interview and physical examination. Discussed plan for treatment and workup in the ED. PPE: Provider wore gloves and surgical mask.   7:11PM Lab called with abnormal results - hemoglobin is 4.6  7:17PM I reassessed the patient and updated him on the results. I discussed admission with the patient. Patient is agreeable.  7:24PM I consented the patient for blood transfusion.   7:28PM I spoke with Dr. Bianchi, hospitalist  who agrees to admit the patient.    9:12 PM Reviewed CT    At the conclusion of the encounter I discussed the results of all of the tests and the disposition. The questions were answered. The patient or family acknowledged understanding and was agreeable with the care plan.       35 minutes of critical care time     MEDICATIONS GIVEN IN THE EMERGENCY:  Medications   lidocaine 1 % 0.1-1 mL (has no administration in time range)   lidocaine (LMX4) cream (has no administration in time range)   sodium chloride (PF) 0.9% PF flush 3 mL (has no administration in time range)   sodium chloride (PF) 0.9% PF flush 3 mL (has no administration in time range)   Contraindications to both pharmacological and mechanical prophylaxis (must document contraindications for both in this order) (has no administration in time range)   glucose gel 15-30 g (has no administration in time range)     Or   dextrose 50 % injection 25-50 mL (has no administration in time range)     Or   glucagon injection 1 mg (has no administration in time range)   insulin aspart (NovoLOG) injection (RAPID ACTING) (has no administration in time range)   insulin aspart (NovoLOG) injection (RAPID ACTING) (has no administration in time range)   albuterol (PROVENTIL) neb solution 2.5 mg (has no administration in time range)   pantoprazole (PROTONIX) IV push injection 40 mg (has no administration in time range)   iopamidol (ISOVUE-370) solution 75 mL (75 mLs Intravenous Given 4/1/22 2040)       NEW PRESCRIPTIONS STARTED AT TODAY'S ER VISIT  New Prescriptions    No medications on file           =================================================================    HPI    Patient information was obtained from: patient and family    Use of : N/A         Solitario Benjamin is a 86 year old male with a pertinent history of centrilobular emphysema, hypertension, and DM2 who presents to this ED for evaluation of bruising and shortness of breath.     Patient reports  "atraumatic bruising to the medial aspect of left arm, which extends from the axilla to the elbow. No associated pain. Denies other abnormal bruising or bleeding gums. Notes that stools have been \"darker\" but not black. Denies blood thinners. Has chronic shortness of breath, although it has gotten significantly worse since yesterday, prompting ED visit today. Ankles are also swollen. No history of liver disease. No alcohol since 1983. Otherwise denies nausea, vomiting, headache, syncope, dizziness, lightheadedness, any current pain, or any other complaints at this time. No daily prescription medications, ran out of antihypertensive 6 weeks ago and pharmacy will not renew the prescription.       REVIEW OF SYSTEMS   Review of Systems   HENT: Negative for dental problem (bleeding gums).    Respiratory: Positive for shortness of breath (chronic, significantly worse).    Cardiovascular: Positive for leg swelling (ankles).   Gastrointestinal: Negative for abdominal pain, nausea and vomiting.        Positive for \"darker\" stools.   Musculoskeletal: Negative for myalgias.        Negative for left arm pain.   Skin: Positive for color change (atraumatic bruising; left medial arm).   Neurological: Negative for dizziness, syncope, light-headedness and headaches.   Hematological: Does not bruise/bleed easily.   All other systems reviewed and are negative.       PAST MEDICAL HISTORY:  History reviewed. No pertinent past medical history.    PAST SURGICAL HISTORY:  History reviewed. No pertinent surgical history.        CURRENT MEDICATIONS:      Current Facility-Administered Medications:      albuterol (PROVENTIL) neb solution 2.5 mg, 2.5 mg, Nebulization, Q6H PRN, Veronica Bianchi MD     Contraindications to both pharmacological and mechanical prophylaxis (must document contraindications for both in this order), , Does not apply, See Admin Instructions, Veronica Bianchi MD     glucose gel 15-30 g, 15-30 g, Oral, Q15 Min PRN " **OR** dextrose 50 % injection 25-50 mL, 25-50 mL, Intravenous, Q15 Min PRN **OR** glucagon injection 1 mg, 1 mg, Subcutaneous, Q15 Min PRN, Veronica Bianchi MD     [START ON 4/2/2022] insulin aspart (NovoLOG) injection (RAPID ACTING), 1-3 Units, Subcutaneous, TID AC, Veronica Bianchi MD     insulin aspart (NovoLOG) injection (RAPID ACTING), 1-3 Units, Subcutaneous, At Bedtime, Veronica Bianchi MD     lidocaine (LMX4) cream, , Topical, Q1H PRN, Veronica Bianchi MD     lidocaine 1 % 0.1-1 mL, 0.1-1 mL, Other, Q1H PRN, Veronica Bianchi MD     pantoprazole (PROTONIX) IV push injection 40 mg, 40 mg, Intravenous, Q24H, Veronica Bianchi MD     sodium chloride (PF) 0.9% PF flush 3 mL, 3 mL, Intracatheter, Q8H, Veronica Bianchi MD     sodium chloride (PF) 0.9% PF flush 3 mL, 3 mL, Intracatheter, q1 min prn, Veronica Bianchi MD    Current Outpatient Medications:      ibuprofen (ADVIL/MOTRIN) 200 MG tablet, Take 200 mg by mouth daily, Disp: , Rfl:      OMEGA-3/DHA/EPA/FISH OIL (FISH OIL-OMEGA-3 FATTY ACIDS) 300-1,000 mg capsule, [OMEGA-3/DHA/EPA/FISH OIL (FISH OIL-OMEGA-3 FATTY ACIDS) 300-1,000 MG CAPSULE] Take 2 g by mouth daily., Disp: , Rfl:      Vitamin D, Cholecalciferol, 25 MCG (1000 UT) TABS, Take 25 mcg by mouth daily, Disp: , Rfl:      vitamin E 400 unit capsule, [VITAMIN E 400 UNIT CAPSULE] Take 400 Units by mouth daily., Disp: , Rfl:      acetaminophen (TYLENOL) 325 MG tablet, [ACETAMINOPHEN (TYLENOL) 325 MG TABLET] Take 2 tablets (650 mg total) by mouth every 4 (four) hours as needed., Disp: , Rfl: 0     albuterol (PROAIR HFA;PROVENTIL HFA;VENTOLIN HFA) 90 mcg/actuation inhaler, [ALBUTEROL (PROAIR HFA;PROVENTIL HFA;VENTOLIN HFA) 90 MCG/ACTUATION INHALER] Inhale 2 puffs every 6 (six) hours as needed for wheezing or shortness of breath., Disp: 1 Inhaler, Rfl: 11     amLODIPine (NORVASC) 5 MG tablet, [AMLODIPINE (NORVASC) 5 MG TABLET] Take 1 tablet (5 mg total) by mouth daily., Disp: 30  tablet, Rfl: 0     aspirin 81 MG EC tablet, [ASPIRIN 81 MG EC TABLET] Take 81 mg by mouth at bedtime., Disp: , Rfl:      atorvastatin (LIPITOR) 20 MG tablet, [ATORVASTATIN (LIPITOR) 20 MG TABLET] Take 20 mg by mouth at bedtime., Disp: , Rfl:      fluticasone (FLONASE ALLERGY RELIEF) 50 mcg/actuation nasal spray, [FLUTICASONE (FLONASE ALLERGY RELIEF) 50 MCG/ACTUATION NASAL SPRAY] One spray in each nostril daily as needed, Disp: 16 g, Rfl: 12     glucosamine sulfate 500 mg cap, [GLUCOSAMINE SULFATE 500 MG CAP] Take 1,000 mg by mouth daily., Disp: , Rfl:      lisinopril (PRINIVIL,ZESTRIL) 20 MG tablet, [LISINOPRIL (PRINIVIL,ZESTRIL) 20 MG TABLET] Take 20 mg by mouth daily., Disp: , Rfl:      loratadine 10 mg cap, [LORATADINE 10 MG CAP] Take 10 mg by mouth daily., Disp: 30 each, Rfl: 11     metFORMIN (GLUCOPHAGE) 1000 MG tablet, [METFORMIN (GLUCOPHAGE) 1000 MG TABLET] Take 1,000 mg by mouth 2 (two) times a day with meals., Disp: , Rfl:      multivitamin therapeutic tablet, [MULTIVITAMIN THERAPEUTIC TABLET] Take 1 tablet by mouth daily., Disp: , Rfl:      omeprazole (PRILOSEC) 20 MG capsule, [OMEPRAZOLE (PRILOSEC) 20 MG CAPSULE] Take 20 mg by mouth daily., Disp: , Rfl:      predniSONE (DELTASONE) 20 MG tablet, [PREDNISONE (DELTASONE) 20 MG TABLET] Take 1 tablet (20 mg total) by mouth daily with breakfast., Disp: 4 tablet, Rfl: 0    ALLERGIES:  No Known Allergies    FAMILY HISTORY:  Family History   Problem Relation Age of Onset     Sleep Apnea Child        SOCIAL HISTORY:   Social History     Socioeconomic History     Marital status:      Spouse name: Not on file     Number of children: Not on file     Years of education: Not on file     Highest education level: Not on file   Occupational History     Not on file   Tobacco Use     Smoking status: Former Smoker     Types: Cigarettes, Cigarettes     Quit date: 1983     Years since quittin.2     Smokeless tobacco: Former User   Substance and Sexual Activity      Alcohol use: No     Drug use: No     Sexual activity: Not Currently   Other Topics Concern     Not on file   Social History Narrative     Not on file     Social Determinants of Health     Financial Resource Strain: Not on file   Food Insecurity: Not on file   Transportation Needs: Not on file   Physical Activity: Not on file   Stress: Not on file   Social Connections: Not on file   Intimate Partner Violence: Not on file   Housing Stability: Not on file       VITALS:  /56   Pulse 85   Temp 97.7  F (36.5  C) (Oral)   Resp 26   Ht 1.829 m (6')   SpO2 95%   BMI 32.82 kg/m      PHYSICAL EXAM    Physical Exam  Constitutional:       General: He is not in acute distress.     Appearance: He is not diaphoretic.   HENT:      Head: Atraumatic.   Eyes:      General: No scleral icterus.     Pupils: Pupils are equal, round, and reactive to light.   Cardiovascular:      Heart sounds: Normal heart sounds.   Pulmonary:      Effort: No respiratory distress.      Breath sounds: Normal breath sounds.   Chest:      Chest wall: No tenderness.   Abdominal:      General: Bowel sounds are normal.      Palpations: Abdomen is soft.      Tenderness: There is no abdominal tenderness.   Musculoskeletal:         General: No tenderness.      Right lower leg: Edema present.      Comments: Nontender ecchymosis of the left arm   Skin:     General: Skin is warm.      Coloration: Skin is pale and sallow.      Findings: No rash.   Neurological:      General: No focal deficit present.   Psychiatric:         Mood and Affect: Mood normal.            LAB:  All pertinent labs reviewed and interpreted.  Results for orders placed or performed during the hospital encounter of 04/01/22   CT Abdomen Pelvis w Contrast    Impression    IMPRESSION:   1.  Obstipation.    2.  Bilateral pleural effusions greatest on the right with bibasilar atelectasis.    3.  Esophageal hiatal hernia.    4.  Cholelithiasis with the gallbladder otherwise normal in  appearance.   CT Head w/o Contrast    Impression    IMPRESSION:  1.  No CT evidence for acute intracranial process.  2.  Brain atrophy and presumed chronic microvascular ischemic changes as above.   XR Shoulder Left 2 Views    Impression    IMPRESSION: Normal joint spaces and alignment. No fracture.    D dimer quantitative   Result Value Ref Range    D-Dimer Quantitative 0.79 (H) 0.00 - 0.50 ug/mL FEU   Comprehensive metabolic panel   Result Value Ref Range    Sodium 136 136 - 145 mmol/L    Potassium 3.9 3.5 - 5.0 mmol/L    Chloride 106 98 - 107 mmol/L    Carbon Dioxide (CO2) 18 (L) 22 - 31 mmol/L    Anion Gap 12 5 - 18 mmol/L    Urea Nitrogen 21 8 - 28 mg/dL    Creatinine 1.22 0.70 - 1.30 mg/dL    Calcium 8.9 8.5 - 10.5 mg/dL    Glucose 135 (H) 70 - 125 mg/dL    Alkaline Phosphatase 62 45 - 120 U/L    AST 25 0 - 40 U/L    ALT 13 0 - 45 U/L    Protein Total 5.9 (L) 6.0 - 8.0 g/dL    Albumin 3.4 (L) 3.5 - 5.0 g/dL    Bilirubin Total 1.0 0.0 - 1.0 mg/dL    GFR Estimate 58 (L) >60 mL/min/1.73m2   B-Type Natriuretic Peptide ([facility] Only)   Result Value Ref Range     (H) 0 - 93 pg/mL   Troponin I (now)   Result Value Ref Range    Troponin I 0.13 0.00 - 0.29 ng/mL   Result Value Ref Range    INR 1.24 (H) 0.90 - 1.15   Result Value Ref Range    aPTT 30 22 - 38 Seconds   CBC with platelets and differential   Result Value Ref Range    WBC Count 8.1 4.0 - 11.0 10e3/uL    RBC Count 2.65 (L) 4.40 - 5.90 10e6/uL    Hemoglobin 4.6 (LL) 13.3 - 17.7 g/dL    Hematocrit 16.7 (L) 40.0 - 53.0 %    MCV 63 (L) 78 - 100 fL    MCH 17.4 (L) 26.5 - 33.0 pg    MCHC 27.5 (L) 31.5 - 36.5 g/dL    RDW 22.6 (H) 10.0 - 15.0 %    Platelet Count 258 150 - 450 10e3/uL    % Neutrophils 80 %    % Lymphocytes 12 %    % Monocytes 8 %    % Eosinophils 0 %    % Basophils 0 %    % Immature Granulocytes 0 %    NRBCs per 100 WBC 0 <1 /100    Absolute Neutrophils 6.5 1.6 - 8.3 10e3/uL    Absolute Lymphocytes 1.0 0.8 - 5.3 10e3/uL    Absolute Monocytes  0.6 0.0 - 1.3 10e3/uL    Absolute Eosinophils 0.0 0.0 - 0.7 10e3/uL    Absolute Basophils 0.0 0.0 - 0.2 10e3/uL    Absolute Immature Granulocytes 0.0 <=0.4 10e3/uL    Absolute NRBCs 0.0 10e3/uL   Extra Red Top Tube   Result Value Ref Range    Hold Specimen JIC    Result Value Ref Range    Lactate Dehydrogenase 179 125 - 220 U/L   Result Value Ref Range    Uric Acid 4.8 3.0 - 8.0 mg/dL   Result Value Ref Range    Magnesium 2.0 1.8 - 2.6 mg/dL   Result Value Ref Range    Bilirubin Direct 0.3 <=0.5 mg/dL   Adult Type and Screen   Result Value Ref Range    ABO/RH(D) O POS     Antibody Screen Negative Negative    SPECIMEN EXPIRATION DATE [id number]    Prepare red blood cells (unit)   Result Value Ref Range    CROSSMATCH Compatible     UNIT ABO/RH O Pos     Unit Number [id number]     Unit Status Ready     Blood Component Type Red Blood Cells     Product Code X0155M82     CODING SYSTEM [id number]     UNIT TYPE ISBT 5100    Prepare red blood cells (unit)   Result Value Ref Range    CROSSMATCH Compatible     UNIT ABO/RH O Pos     Unit Number [id number]     Unit Status Ready     Blood Component Type Red Blood Cells     Product Code K1409J91     CODING SYSTEM [id number]     UNIT TYPE ISBT 5100    Prepare red blood cells (unit)   Result Value Ref Range    CROSSMATCH Compatible     UNIT ABO/RH O Pos     Unit Number [id number]     Unit Status Ready     Blood Component Type Red Blood Cells     Product Code M5416E77     CODING SYSTEM [id number]     UNIT TYPE ISBT 5100        RADIOLOGY:  Reviewed all pertinent imaging. Please see official radiology report.  CT Abdomen Pelvis w Contrast   Preliminary Result   IMPRESSION:    1.  Obstipation.      2.  Bilateral pleural effusions greatest on the right with bibasilar atelectasis.      3.  Esophageal hiatal hernia.      4.  Cholelithiasis with the gallbladder otherwise normal in appearance.      XR Shoulder Left 2 Views   Final Result   IMPRESSION: Normal joint spaces and alignment.  No fracture.       CT Head w/o Contrast   Preliminary Result   IMPRESSION:   1.  No CT evidence for acute intracranial process.   2.  Brain atrophy and presumed chronic microvascular ischemic changes as above.      US Upper Extremity Venous Duplex Left    (Results Pending)   CT Chest Pulmonary Embolism w Contrast    (Results Pending)   Echocardiogram Complete    (Results Pending)       EKG:    Performed at: 1739    Impression: sinus with sinus arrhythmia, inferior infarct (cited on or before 21-Jan-2018).     Rate: 91  Rhythm: sinus with sinus arrhythmia   Axis: -5  WI Interval: 168  QRS Interval: 118  QTc Interval: 462  ST Changes: none  Comparison: 21-Jan-2018    I have independently reviewed and interpreted the EKG(s) documented above.         I, Jess Adam, am serving as a scribe to document services personally performed by Dr. Oliveira based on my observation and the provider's statements to me. I, Jacques Oliveira MD attest that Jess Adam is acting in a scribe capacity, has observed my performance of the services and has documented them in accordance with my direction.    Jacques Oliveira M.D.  Emergency Medicine  [facility] EMERGENCY DEPARTMENT  [facility] [facility] [id number] [phone]  Dept: [phone]     Jacques Oliveira MD  04/01/22 2116

## 2022-04-01 NOTE — ED TRIAGE NOTES
Patient presents to ED for left arm bruising and increased shortness of breath.  Bruising to arm started yesterday.  No trauma.  Never had this happen.  Also states increased shortness of breath the last 2 days.  Normally has shortness of breath due to COPD, but states the last 2 days the shortness of breath has increased.

## 2022-04-02 ENCOUNTER — APPOINTMENT (OUTPATIENT)
Dept: ULTRASOUND IMAGING | Facility: HOSPITAL | Age: 87
DRG: 368 | End: 2022-04-02
Attending: EMERGENCY MEDICINE
Payer: COMMERCIAL

## 2022-04-02 ENCOUNTER — APPOINTMENT (OUTPATIENT)
Dept: CARDIOLOGY | Facility: HOSPITAL | Age: 87
DRG: 368 | End: 2022-04-02
Attending: INTERNAL MEDICINE
Payer: COMMERCIAL

## 2022-04-02 PROBLEM — I50.23 ACUTE ON CHRONIC SYSTOLIC HEART FAILURE (H): Status: ACTIVE | Noted: 2022-04-02

## 2022-04-02 PROBLEM — I10 ACCELERATED HYPERTENSION: Status: ACTIVE | Noted: 2018-01-21

## 2022-04-02 PROBLEM — R79.89 ELEVATED TROPONIN LEVEL NOT DUE TO ACUTE CORONARY SYNDROME: Status: ACTIVE | Noted: 2022-04-02

## 2022-04-02 LAB
ALBUMIN SERPL-MCNC: 3.3 G/DL (ref 3.5–5)
ALP SERPL-CCNC: 69 U/L (ref 45–120)
ALT SERPL W P-5'-P-CCNC: 16 U/L (ref 0–45)
ANION GAP SERPL CALCULATED.3IONS-SCNC: 12 MMOL/L (ref 5–18)
AST SERPL W P-5'-P-CCNC: 24 U/L (ref 0–40)
BASOPHILS # BLD AUTO: 0 10E3/UL (ref 0–0.2)
BASOPHILS NFR BLD AUTO: 1 %
BILIRUB SERPL-MCNC: 1.4 MG/DL (ref 0–1)
BLD PROD TYP BPU: NORMAL
BLOOD COMPONENT TYPE: NORMAL
BUN SERPL-MCNC: 20 MG/DL (ref 8–28)
CALCIUM SERPL-MCNC: 8.8 MG/DL (ref 8.5–10.5)
CHLORIDE BLD-SCNC: 107 MMOL/L (ref 98–107)
CO2 SERPL-SCNC: 19 MMOL/L (ref 22–31)
CODING SYSTEM: NORMAL
CREAT SERPL-MCNC: 1.17 MG/DL (ref 0.7–1.3)
CROSSMATCH: NORMAL
EOSINOPHIL # BLD AUTO: 0 10E3/UL (ref 0–0.7)
EOSINOPHIL NFR BLD AUTO: 1 %
ERYTHROCYTE [DISTWIDTH] IN BLOOD BY AUTOMATED COUNT: 25.3 % (ref 10–15)
FERRITIN SERPL-MCNC: 22 NG/ML (ref 27–300)
GFR SERPL CREATININE-BSD FRML MDRD: 61 ML/MIN/1.73M2
GLUCOSE BLD-MCNC: 103 MG/DL (ref 70–125)
GLUCOSE BLDC GLUCOMTR-MCNC: 102 MG/DL (ref 70–99)
GLUCOSE BLDC GLUCOMTR-MCNC: 108 MG/DL (ref 70–99)
GLUCOSE BLDC GLUCOMTR-MCNC: 110 MG/DL (ref 70–99)
GLUCOSE BLDC GLUCOMTR-MCNC: 116 MG/DL (ref 70–99)
GLUCOSE BLDC GLUCOMTR-MCNC: 134 MG/DL (ref 70–99)
HAPTOGLOB SERPL-MCNC: 114 MG/DL (ref 33–171)
HCT VFR BLD AUTO: 25.1 % (ref 40–53)
HGB BLD-MCNC: 7 G/DL (ref 13.3–17.7)
HGB BLD-MCNC: 7 G/DL (ref 13.3–17.7)
HGB BLD-MCNC: 7.5 G/DL (ref 13.3–17.7)
HGB BLD-MCNC: 7.8 G/DL (ref 13.3–17.7)
HOLD SPECIMEN: NORMAL
HOLD SPECIMEN: NORMAL
IMM GRANULOCYTES # BLD: 0 10E3/UL
IMM GRANULOCYTES NFR BLD: 1 %
ISSUE DATE AND TIME: NORMAL
LVEF ECHO: NORMAL
LYMPHOCYTES # BLD AUTO: 1.3 10E3/UL (ref 0.8–5.3)
LYMPHOCYTES NFR BLD AUTO: 18 %
MCH RBC QN AUTO: 20.6 PG (ref 26.5–33)
MCHC RBC AUTO-ENTMCNC: 27.9 G/DL (ref 31.5–36.5)
MCV RBC AUTO: 74 FL (ref 78–100)
MONOCYTES # BLD AUTO: 0.7 10E3/UL (ref 0–1.3)
MONOCYTES NFR BLD AUTO: 10 %
NEUTROPHILS # BLD AUTO: 5 10E3/UL (ref 1.6–8.3)
NEUTROPHILS NFR BLD AUTO: 69 %
NRBC # BLD AUTO: 0 10E3/UL
NRBC BLD AUTO-RTO: 0 /100
PLATELET # BLD AUTO: 224 10E3/UL (ref 150–450)
POTASSIUM BLD-SCNC: 3.7 MMOL/L (ref 3.5–5)
PROT SERPL-MCNC: 5.9 G/DL (ref 6–8)
RBC # BLD AUTO: 3.39 10E6/UL (ref 4.4–5.9)
RETICS # AUTO: 0.04 10E6/UL (ref 0.01–0.11)
RETICS/RBC NFR AUTO: 1.3 % (ref 0.8–2.7)
SARS-COV-2 RNA RESP QL NAA+PROBE: NEGATIVE
SODIUM SERPL-SCNC: 138 MMOL/L (ref 136–145)
T4 FREE SERPL-MCNC: 0.82 NG/DL (ref 0.7–1.8)
TROPONIN I SERPL-MCNC: 0.58 NG/ML (ref 0–0.29)
TROPONIN I SERPL-MCNC: 0.69 NG/ML (ref 0–0.29)
TROPONIN I SERPL-MCNC: 0.95 NG/ML (ref 0–0.29)
UNIT ABO/RH: NORMAL
UNIT NUMBER: NORMAL
UNIT STATUS: NORMAL
UNIT TYPE ISBT: 5100
VIT B12 SERPL-MCNC: 1108 PG/ML (ref 213–816)
WBC # BLD AUTO: 7.1 10E3/UL (ref 4–11)

## 2022-04-02 PROCEDURE — 82728 ASSAY OF FERRITIN: CPT | Performed by: HOSPITALIST

## 2022-04-02 PROCEDURE — 93010 ELECTROCARDIOGRAM REPORT: CPT | Performed by: INTERNAL MEDICINE

## 2022-04-02 PROCEDURE — 85060 BLOOD SMEAR INTERPRETATION: CPT | Performed by: PATHOLOGY

## 2022-04-02 PROCEDURE — 250N000011 HC RX IP 250 OP 636: Performed by: HOSPITALIST

## 2022-04-02 PROCEDURE — 84484 ASSAY OF TROPONIN QUANT: CPT | Performed by: HOSPITALIST

## 2022-04-02 PROCEDURE — C9113 INJ PANTOPRAZOLE SODIUM, VIA: HCPCS | Performed by: INTERNAL MEDICINE

## 2022-04-02 PROCEDURE — 258N000003 HC RX IP 258 OP 636: Performed by: HOSPITALIST

## 2022-04-02 PROCEDURE — 250N000011 HC RX IP 250 OP 636: Performed by: INTERNAL MEDICINE

## 2022-04-02 PROCEDURE — 85025 COMPLETE CBC W/AUTO DIFF WBC: CPT | Performed by: INTERNAL MEDICINE

## 2022-04-02 PROCEDURE — 99222 1ST HOSP IP/OBS MODERATE 55: CPT | Performed by: INTERNAL MEDICINE

## 2022-04-02 PROCEDURE — 93005 ELECTROCARDIOGRAM TRACING: CPT

## 2022-04-02 PROCEDURE — 84484 ASSAY OF TROPONIN QUANT: CPT | Performed by: INTERNAL MEDICINE

## 2022-04-02 PROCEDURE — 36415 COLL VENOUS BLD VENIPUNCTURE: CPT | Performed by: INTERNAL MEDICINE

## 2022-04-02 PROCEDURE — P9016 RBC LEUKOCYTES REDUCED: HCPCS | Performed by: HOSPITALIST

## 2022-04-02 PROCEDURE — 85045 AUTOMATED RETICULOCYTE COUNT: CPT | Performed by: INTERNAL MEDICINE

## 2022-04-02 PROCEDURE — P9016 RBC LEUKOCYTES REDUCED: HCPCS | Performed by: EMERGENCY MEDICINE

## 2022-04-02 PROCEDURE — 36415 COLL VENOUS BLD VENIPUNCTURE: CPT | Performed by: HOSPITALIST

## 2022-04-02 PROCEDURE — 84439 ASSAY OF FREE THYROXINE: CPT | Performed by: INTERNAL MEDICINE

## 2022-04-02 PROCEDURE — 85018 HEMOGLOBIN: CPT | Performed by: INTERNAL MEDICINE

## 2022-04-02 PROCEDURE — 250N000013 HC RX MED GY IP 250 OP 250 PS 637: Performed by: INTERNAL MEDICINE

## 2022-04-02 PROCEDURE — 82180 ASSAY OF ASCORBIC ACID: CPT | Performed by: INTERNAL MEDICINE

## 2022-04-02 PROCEDURE — 93306 TTE W/DOPPLER COMPLETE: CPT | Mod: 26 | Performed by: INTERNAL MEDICINE

## 2022-04-02 PROCEDURE — 80053 COMPREHEN METABOLIC PANEL: CPT | Performed by: INTERNAL MEDICINE

## 2022-04-02 PROCEDURE — 250N000013 HC RX MED GY IP 250 OP 250 PS 637: Performed by: HOSPITALIST

## 2022-04-02 PROCEDURE — 93971 EXTREMITY STUDY: CPT | Mod: LT

## 2022-04-02 PROCEDURE — 210N000001 HC R&B IMCU HEART CARE

## 2022-04-02 PROCEDURE — 83540 ASSAY OF IRON: CPT | Performed by: HOSPITALIST

## 2022-04-02 PROCEDURE — 255N000002 HC RX 255 OP 636: Performed by: HOSPITALIST

## 2022-04-02 PROCEDURE — 84466 ASSAY OF TRANSFERRIN: CPT | Performed by: HOSPITALIST

## 2022-04-02 PROCEDURE — 999N000208 ECHOCARDIOGRAM COMPLETE

## 2022-04-02 PROCEDURE — 99222 1ST HOSP IP/OBS MODERATE 55: CPT | Mod: 25 | Performed by: INTERNAL MEDICINE

## 2022-04-02 PROCEDURE — 99233 SBSQ HOSP IP/OBS HIGH 50: CPT | Performed by: HOSPITALIST

## 2022-04-02 RX ORDER — ASPIRIN 81 MG/1
81 TABLET ORAL DAILY
Status: DISCONTINUED | OUTPATIENT
Start: 2022-04-02 | End: 2022-04-06 | Stop reason: HOSPADM

## 2022-04-02 RX ORDER — AMOXICILLIN 500 MG
1200 CAPSULE ORAL DAILY
COMMUNITY
End: 2023-03-22

## 2022-04-02 RX ORDER — AMLODIPINE BESYLATE 5 MG/1
5 TABLET ORAL DAILY
Status: DISCONTINUED | OUTPATIENT
Start: 2022-04-02 | End: 2022-04-02 | Stop reason: ALTCHOICE

## 2022-04-02 RX ORDER — DOCUSATE SODIUM 100 MG/1
100 CAPSULE, LIQUID FILLED ORAL DAILY
COMMUNITY

## 2022-04-02 RX ORDER — METOPROLOL SUCCINATE 25 MG/1
25 TABLET, EXTENDED RELEASE ORAL DAILY
Status: DISCONTINUED | OUTPATIENT
Start: 2022-04-02 | End: 2022-04-06 | Stop reason: HOSPADM

## 2022-04-02 RX ORDER — SODIUM PHOSPHATE,MONO-DIBASIC 19G-7G/118
3 ENEMA (ML) RECTAL DAILY
COMMUNITY
End: 2023-11-29

## 2022-04-02 RX ORDER — FUROSEMIDE 10 MG/ML
20 INJECTION INTRAMUSCULAR; INTRAVENOUS EVERY 6 HOURS
Status: COMPLETED | OUTPATIENT
Start: 2022-04-02 | End: 2022-04-03

## 2022-04-02 RX ADMIN — LORATADINE 10 MG: 10 TABLET ORAL at 08:05

## 2022-04-02 RX ADMIN — METOPROLOL SUCCINATE 25 MG: 25 TABLET, EXTENDED RELEASE ORAL at 14:31

## 2022-04-02 RX ADMIN — FUROSEMIDE 20 MG: 10 INJECTION, SOLUTION INTRAMUSCULAR; INTRAVENOUS at 14:31

## 2022-04-02 RX ADMIN — PERFLUTREN 2.5 ML: 6.52 INJECTION, SUSPENSION INTRAVENOUS at 10:57

## 2022-04-02 RX ADMIN — AMLODIPINE BESYLATE 5 MG: 5 TABLET ORAL at 10:00

## 2022-04-02 RX ADMIN — IRON SUCROSE 200 MG: 20 INJECTION, SOLUTION INTRAVENOUS at 19:49

## 2022-04-02 RX ADMIN — HYDRALAZINE HYDROCHLORIDE 12.5 MG: 25 TABLET, FILM COATED ORAL at 22:11

## 2022-04-02 RX ADMIN — DOCUSATE SODIUM 50 MG AND SENNOSIDES 8.6 MG 1 TABLET: 8.6; 5 TABLET, FILM COATED ORAL at 20:58

## 2022-04-02 RX ADMIN — ASPIRIN 81 MG: 81 TABLET, COATED ORAL at 14:31

## 2022-04-02 RX ADMIN — PANTOPRAZOLE SODIUM 40 MG: 40 INJECTION, POWDER, FOR SOLUTION INTRAVENOUS at 20:58

## 2022-04-02 RX ADMIN — DOCUSATE SODIUM 50 MG AND SENNOSIDES 8.6 MG 1 TABLET: 8.6; 5 TABLET, FILM COATED ORAL at 08:05

## 2022-04-02 RX ADMIN — FUROSEMIDE 20 MG: 10 INJECTION, SOLUTION INTRAMUSCULAR; INTRAVENOUS at 19:39

## 2022-04-02 RX ADMIN — HYDRALAZINE HYDROCHLORIDE 12.5 MG: 25 TABLET, FILM COATED ORAL at 14:31

## 2022-04-02 ASSESSMENT — ACTIVITIES OF DAILY LIVING (ADL)
ADLS_ACUITY_SCORE: 12

## 2022-04-02 NOTE — PLAN OF CARE
Problem: Anemia  Goal: Anemia Symptom Improvement  Outcome: Ongoing, Progressing   1unit pRBCs transfused this AM. Recheck hgb improved. Continues to feel dyspnea with exertion. Sats stable in mid 90s on RA    Problem: Cardiac Output Decreased  Goal: Effective Cardiac Output  Outcome: Ongoing, Progressing  Excellent urine output with IV lasix. VSS. ECHO done this AM.     Family visiting at bedside this afternoon.

## 2022-04-02 NOTE — CONSULTS
[facility] Hematology and Oncology Inpatient Consult Note    Patient: Solitario Benjamin  MRN: [id number]  Date of Service: 4/2/2022      Reason for Visit    I was consulted by  regarding anemia    Assessment/Plan    Microcytic anemia consistent with iron deficiency anemia  Left rotator cuff tear with hematoma  Demand non-STEMI  30 pound weight loss      CBC shows significant microcytic anemia with normal white count and differential and normal platelet count.  Hemoglobin and MCV were normal back in 2018.  Labs from 1 year ago do show low ferritin and low iron saturation so the overall picture is consistent with iron deficiency anemia.    No major evidence of active bleeding so this could be related to malabsorption.  Recommend IV iron replacement while in the hospital and given the 30 pound weight loss having GI proceed with scopes to rule out any source of bleeding.    Left rotator cuff tear with hematoma is likely incidental and not a major cause of the drop in hemoglobin.    CT scans do not show concern for malignancy.  Prostate cancer was treated with surgery alone almost 17 years ago and is likely not active.    Discussed with patient and family.    Plan: Would initiate IV iron replacement while in the hospital with Venofer  We will have GI proceed with scopes to rule out source for bleeding  Check PSA          ECOG Performance Status: 1            ______________________________________________________________________________      Staging History    Cancer Staging  No matching staging information was found for the patient.      History  Mr. Solitario Benjamin is a 86 year old with past history of COPD and prostate cancer who was admitted yesterday with increasing shortness of breath and weakness and note of left upper extremity bruising.  Patient reports some pain in the left arm today.  No previous history of anemia.  No previous history of iron deficiency.  No previous transfusions.  No trauma to  the left arm.  Denies any bleeding symptoms.    Labs show profound microcytic anemia with normal labs in 2018.  CMP essentially normal.  Ultrasound shows left axillary hematoma.  CT of the abdomen and pelvis and chest showed no evidence of malignancy.  Note of obstipation and cholelithiasis.    Seen by orthopedics who diagnosed a left rotator cuff tear and recommend conservative management.  Seen by GI who asked hematology to see prior to any procedures.  Seen by cardiology with echo showing decreased ejection fraction.    Labs from a year ago showed ferritin of 11 and iron saturation of 4% consistent with iron deficiency anemia.    Review of systems.  No fever or night sweats.  No loss of weight.  No lumps or bumps anywhere.  No unusual headaches or eyesight issues.  No dizziness.  No bleeding from the nose.  No sores in the mouth. No problems with swallowing.  No chest pain. No shortness of breath. No cough.  No abdominal pain. No nausea or vomiting.  No diarrhea or constipation.  No blood in stool or black colored stools.  No problems passing urine.  No numbness or tingling in hands or feet.  No skin rashes.  A 14 point review of systems is otherwise negative.        Past History  Past Medical History:   Diagnosis Date     Accelerated hypertension 1/21/2018     Acute on chronic systolic heart failure (H) 4/2/2022     Centrilobular emphysema (H) 4/1/2022     Elevated troponin level not due to acute coronary syndrome 4/2/2022     Type 2 diabetes mellitus without complication, without long-term current use of insulin (H) 1/21/2018     History reviewed. No pertinent surgical history.  Family History   Problem Relation Age of Onset     Myocardial Infarction Mother      Myocardial Infarction Father      Myocardial Infarction Sister      Myocardial Infarction Brother      Sleep Apnea Child      Social History     Socioeconomic History     Marital status:      Spouse name: None     Number of children: 3     Years  "of education: None     Highest education level: None   Occupational History     Occupation: Retired     Comment: Grocery store truck delivery   Tobacco Use     Smoking status: Former Smoker     Types: Cigarettes, Cigarettes     Quit date: 1983     Years since quittin.2     Smokeless tobacco: Former User   Substance and Sexual Activity     Alcohol use: No     Drug use: No     Sexual activity: Not Currently   Other Topics Concern     None   Social History Narrative     None     Social Determinants of Health     Financial Resource Strain: Not on file   Food Insecurity: Not on file   Transportation Needs: Not on file   Physical Activity: Not on file   Stress: Not on file   Social Connections: Not on file   Intimate Partner Violence: Not on file   Housing Stability: Not on file       Allergies    No Known Allergies       Physical Exam    /62 (BP Location: Right arm)   Pulse 79   Temp 97.9  F (36.6  C) (Oral)   Resp 18   Ht 1.854 m (6' 1\")   Wt 88.2 kg (194 lb 6.4 oz)   SpO2 92%   BMI 25.65 kg/m      GENERAL: Alert and oriented to time place and person. Seated comfortably. In no distress.    HEAD: Atraumatic and normocephalic.    EYES: JONAH, EOMI.  No pallor.  No icterus.    Oral cavity: no mucosal lesion or tonsillar enlargement.    NECK: supple. JVP normal.  No thyroid enlargement.    LYMPH NODES: No palpable, cervical, axillary or inguinal lymphadenopathy.    CHEST: clear to auscultation bilaterally.  Resonant to percussion throughout bilaterally.  Symmetrical breath movements bilaterally.    CVS: S1 and S2 are heard. Regular rate and rhythm.  No murmur or gallop or rub heard.  No peripheral edema.    ABDOMEN: Soft. Not tender. Not distended.  No palpable hepatomegaly or splenomegaly.  No other mass palpable.  Bowel sounds heard.    EXTREMITIES: Warm.    SKIN: no rash, or bruising or purpura.  Has a full head of hair.    Lab Results  Recent Results (from the past 24 hour(s))   D dimer " quantitative    Collection Time: 04/01/22  6:38 PM   Result Value Ref Range    D-Dimer Quantitative 0.79 (H) 0.00 - 0.50 ug/mL FEU   Comprehensive metabolic panel    Collection Time: 04/01/22  6:38 PM   Result Value Ref Range    Sodium 136 136 - 145 mmol/L    Potassium 3.9 3.5 - 5.0 mmol/L    Chloride 106 98 - 107 mmol/L    Carbon Dioxide (CO2) 18 (L) 22 - 31 mmol/L    Anion Gap 12 5 - 18 mmol/L    Urea Nitrogen 21 8 - 28 mg/dL    Creatinine 1.22 0.70 - 1.30 mg/dL    Calcium 8.9 8.5 - 10.5 mg/dL    Glucose 135 (H) 70 - 125 mg/dL    Alkaline Phosphatase 62 45 - 120 U/L    AST 25 0 - 40 U/L    ALT 13 0 - 45 U/L    Protein Total 5.9 (L) 6.0 - 8.0 g/dL    Albumin 3.4 (L) 3.5 - 5.0 g/dL    Bilirubin Total 1.0 0.0 - 1.0 mg/dL    GFR Estimate 58 (L) >60 mL/min/1.73m2   B-Type Natriuretic Peptide ([facility] Only)    Collection Time: 04/01/22  6:38 PM   Result Value Ref Range     (H) 0 - 93 pg/mL   Troponin I (now)    Collection Time: 04/01/22  6:38 PM   Result Value Ref Range    Troponin I 0.13 0.00 - 0.29 ng/mL   INR    Collection Time: 04/01/22  6:38 PM   Result Value Ref Range    INR 1.24 (H) 0.90 - 1.15   PTT    Collection Time: 04/01/22  6:38 PM   Result Value Ref Range    aPTT 30 22 - 38 Seconds   CBC with platelets and differential    Collection Time: 04/01/22  6:38 PM   Result Value Ref Range    WBC Count 8.1 4.0 - 11.0 10e3/uL    RBC Count 2.65 (L) 4.40 - 5.90 10e6/uL    Hemoglobin 4.6 (LL) 13.3 - 17.7 g/dL    Hematocrit 16.7 (L) 40.0 - 53.0 %    MCV 63 (L) 78 - 100 fL    MCH 17.4 (L) 26.5 - 33.0 pg    MCHC 27.5 (L) 31.5 - 36.5 g/dL    RDW 22.6 (H) 10.0 - 15.0 %    Platelet Count 258 150 - 450 10e3/uL    % Neutrophils 80 %    % Lymphocytes 12 %    % Monocytes 8 %    % Eosinophils 0 %    % Basophils 0 %    % Immature Granulocytes 0 %    NRBCs per 100 WBC 0 <1 /100    Absolute Neutrophils 6.5 1.6 - 8.3 10e3/uL    Absolute Lymphocytes 1.0 0.8 - 5.3 10e3/uL    Absolute Monocytes 0.6 0.0 - 1.3 10e3/uL    Absolute  Eosinophils 0.0 0.0 - 0.7 10e3/uL    Absolute Basophils 0.0 0.0 - 0.2 10e3/uL    Absolute Immature Granulocytes 0.0 <=0.4 10e3/uL    Absolute NRBCs 0.0 10e3/uL   Extra Red Top Tube    Collection Time: 04/01/22  6:38 PM   Result Value Ref Range    Hold Specimen JI    Adult Type and Screen    Collection Time: 04/01/22  6:38 PM   Result Value Ref Range    ABO/RH(D) O POS     Antibody Screen Negative Negative    SPECIMEN EXPIRATION DATE [id number]    Lactate Dehydrogenase    Collection Time: 04/01/22  6:38 PM   Result Value Ref Range    Lactate Dehydrogenase 179 125 - 220 U/L   Uric acid    Collection Time: 04/01/22  6:38 PM   Result Value Ref Range    Uric Acid 4.8 3.0 - 8.0 mg/dL   Magnesium    Collection Time: 04/01/22  6:38 PM   Result Value Ref Range    Magnesium 2.0 1.8 - 2.6 mg/dL   Bilirubin direct    Collection Time: 04/01/22  6:38 PM   Result Value Ref Range    Bilirubin Direct 0.3 <=0.5 mg/dL   Hemoglobin A1c    Collection Time: 04/01/22  6:38 PM   Result Value Ref Range    Hemoglobin A1C 5.7 (H) <=5.6 %   Haptoglobin    Collection Time: 04/01/22  6:38 PM   Result Value Ref Range    Haptoglobin 114 33 - 171 mg/dL   TSH    Collection Time: 04/01/22  6:38 PM   Result Value Ref Range    TSH 6.98 (H) 0.30 - 5.00 uIU/mL   Vitamin B12    Collection Time: 04/01/22  6:38 PM   Result Value Ref Range    Vitamin B12 1,108 (H) 213 - 816 pg/mL   Prepare red blood cells (unit)    Collection Time: 04/01/22  7:30 PM   Result Value Ref Range    CROSSMATCH Compatible     UNIT ABO/RH O Pos     Unit Number [id number]     Unit Status Ready     Blood Component Type Red Blood Cells     Product Code [id number]     CODING SYSTEM [id number]     UNIT TYPE ISBT 5100    Prepare red blood cells (unit)    Collection Time: 04/01/22  7:30 PM   Result Value Ref Range    CROSSMATCH Compatible     UNIT ABO/RH O Pos     Unit Number [id number]     Unit Status Issued     Blood Component Type Red Blood Cells     Product Code [id number]      CODING SYSTEM [id number]     UNIT TYPE ISBT 5100     ISSUE DATE AND TIME [id number]    Prepare red blood cells (unit)    Collection Time: 04/01/22  7:30 PM   Result Value Ref Range    CROSSMATCH Compatible     UNIT ABO/RH O Pos     Unit Number [id number]     Unit Status Issued     Blood Component Type Red Blood Cells     Product Code [id number]     CODING SYSTEM [id number]     UNIT TYPE ISBT 5100     ISSUE DATE AND TIME [id number]    Glucose by meter    Collection Time: 04/01/22  9:21 PM   Result Value Ref Range    GLUCOSE BY METER POCT 120 (H) 70 - 99 mg/dL   Asymptomatic COVID-19 Virus (Coronavirus) by PCR Nasopharyngeal    Collection Time: 04/01/22 11:44 PM    Specimen: Nasopharyngeal; Swab   Result Value Ref Range    SARS CoV2 PCR Negative Negative   Glucose by meter    Collection Time: 04/01/22 11:52 PM   Result Value Ref Range    GLUCOSE BY METER POCT 145 (H) 70 - 99 mg/dL   T4 free    Collection Time: 04/02/22  5:21 AM   Result Value Ref Range    Free T4 0.82 0.70 - 1.80 ng/dL   Comprehensive metabolic panel    Collection Time: 04/02/22  5:21 AM   Result Value Ref Range    Sodium 138 136 - 145 mmol/L    Potassium 3.7 3.5 - 5.0 mmol/L    Chloride 107 98 - 107 mmol/L    Carbon Dioxide (CO2) 19 (L) 22 - 31 mmol/L    Anion Gap 12 5 - 18 mmol/L    Urea Nitrogen 20 8 - 28 mg/dL    Creatinine 1.17 0.70 - 1.30 mg/dL    Calcium 8.8 8.5 - 10.5 mg/dL    Glucose 103 70 - 125 mg/dL    Alkaline Phosphatase 69 45 - 120 U/L    AST 24 0 - 40 U/L    ALT 16 0 - 45 U/L    Protein Total 5.9 (L) 6.0 - 8.0 g/dL    Albumin 3.3 (L) 3.5 - 5.0 g/dL    Bilirubin Total 1.4 (H) 0.0 - 1.0 mg/dL    GFR Estimate 61 >60 mL/min/1.73m2   CBC with platelets and differential    Collection Time: 04/02/22  5:21 AM   Result Value Ref Range    WBC Count 7.1 4.0 - 11.0 10e3/uL    RBC Count 3.39 (L) 4.40 - 5.90 10e6/uL    Hemoglobin 7.0 (L) 13.3 - 17.7 g/dL    Hematocrit 25.1 (L) 40.0 - 53.0 %    MCV 74 (L) 78 - 100 fL    MCH 20.6 (L) 26.5 - 33.0 pg     MCHC 27.9 (L) 31.5 - 36.5 g/dL    RDW 25.3 (H) 10.0 - 15.0 %    Platelet Count 224 150 - 450 10e3/uL    % Neutrophils 69 %    % Lymphocytes 18 %    % Monocytes 10 %    % Eosinophils 1 %    % Basophils 1 %    % Immature Granulocytes 1 %    NRBCs per 100 WBC 0 <1 /100    Absolute Neutrophils 5.0 1.6 - 8.3 10e3/uL    Absolute Lymphocytes 1.3 0.8 - 5.3 10e3/uL    Absolute Monocytes 0.7 0.0 - 1.3 10e3/uL    Absolute Eosinophils 0.0 0.0 - 0.7 10e3/uL    Absolute Basophils 0.0 0.0 - 0.2 10e3/uL    Absolute Immature Granulocytes 0.0 <=0.4 10e3/uL    Absolute NRBCs 0.0 10e3/uL   Reticulocyte count    Collection Time: 04/02/22  5:21 AM   Result Value Ref Range    % Reticulocyte 1.3 0.8 - 2.7 %    Absolute Reticulocyte 0.043 0.010 - 0.110 10e6/uL   Troponin I    Collection Time: 04/02/22  6:06 AM   Result Value Ref Range    Troponin I 0.58 (HH) 0.00 - 0.29 ng/mL   Hemoglobin    Collection Time: 04/02/22  6:06 AM   Result Value Ref Range    Hemoglobin 7.0 (L) 13.3 - 17.7 g/dL   Extra Purple Top Tube    Collection Time: 04/02/22  6:06 AM   Result Value Ref Range    Hold Specimen JIC    Troponin I    Collection Time: 04/02/22  6:59 AM   Result Value Ref Range    Troponin I 0.69 (HH) 0.00 - 0.29 ng/mL   Extra Purple Top Tube    Collection Time: 04/02/22  6:59 AM   Result Value Ref Range    Hold Specimen JIC    Prepare red blood cells (unit)    Collection Time: 04/02/22  7:45 AM   Result Value Ref Range    CROSSMATCH Compatible     UNIT ABO/RH O Pos     Unit Number [id number]     Unit Status Issued     Blood Component Type Red Blood Cells     Product Code [id number]     CODING SYSTEM [id number]     UNIT TYPE ISBT 5100     ISSUE DATE AND TIME [id number]    ECG 12-LEAD WITH MUSE (LHE)    Collection Time: 04/02/22  7:48 AM   Result Value Ref Range    Systolic Blood Pressure  mmHg    Diastolic Blood Pressure  mmHg    Ventricular Rate 83 BPM    Atrial Rate 83 BPM    NY Interval 176 ms    QRS Duration 122 ms     ms    QTc 481  ms    P Axis 40 degrees    R AXIS 1 degrees    T Axis 54 degrees    Interpretation ECG       Sinus rhythm with Premature atrial complexes with Aberrant conduction  Inferior infarct (cited on or before 2018)  Abnormal ECG  When compared with ECG of 2022 17:39,  Aberrant conduction is now Present  T wave inversion no longer evident in Inferior leads     Glucose by meter    Collection Time: 22  8:03 AM   Result Value Ref Range    GLUCOSE BY METER POCT 102 (H) 70 - 99 mg/dL   Echocardiogram Complete    Collection Time: 22 11:01 AM   Result Value Ref Range    LVEF  25-30% (severely reduced)    Glucose by meter    Collection Time: 22 11:52 AM   Result Value Ref Range    GLUCOSE BY METER POCT 110 (H) 70 - 99 mg/dL   Hemoglobin    Collection Time: 22 12:27 PM   Result Value Ref Range    Hemoglobin 7.8 (L) 13.3 - 17.7 g/dL   Ferritin    Collection Time: 22 12:27 PM   Result Value Ref Range    Ferritin 22 (L) 27 - 300 ng/mL   Troponin I    Collection Time: 22 12:27 PM   Result Value Ref Range    Troponin I 0.95 (HH) 0.00 - 0.29 ng/mL        Imaging Results    Echocardiogram Complete    Result Date: 2022  [id number]^HOMER^KENYON^[place] [address]  Name: AUGUSTIN GHOSH MRN: [id number] : 1935 Study Date: 2022 10:05 AM Age: 86 yrs Gender: Male Patient Location: [facility] Reason For Study: SOB Ordering Physician: KENYON CORONEL Performed By: JESUS  BSA: 2.1 m2 Height: 73 in Weight: 194 lb HR: 77 BP: 150/64 mmHg ______________________________________________________________________________ Procedure Definity ([facility] #32144-567) given intravenously. Lot [id number], Exp . ______________________________________________________________________________ Interpretation Summary  Left ventricular function is decreased. The ejection fraction is 25-30% (severely reduced). There is mod-severe global hypokinesia  of the left ventricle. There is inferior & inferolateral wall akinesis. Right ventrical function, chamber size, wall motion, and thickness are normal. The left atrium is mildly dilated. The right atrium is mildly dilated. The right ventricular systolic pressure is approximated at 47mmHg plus the right atrial pressure. IVC diameter >2.1 cm collapsing <50% with sniff suggests a high RA pressure estimated at 15 mmHg or greater. ______________________________________________________________________________ Left Ventricle The left ventricle is moderately dilated. Left ventricular function is decreased. The ejection fraction is 25-30% (severely reduced). There is mild eccentric left ventricular hypertrophy. Left ventricular diastolic function is abnormal. There is mod-severe global hypokinesia of the left ventricle. There is inferior & inferolateral wall akinesis.  Right Ventricle Right ventrical function, chamber size, wall motion, and thickness are normal.  Atria The left atrium is mildly dilated. The right atrium is mildly dilated. There is no color Doppler evidence of an atrial shunt.  Mitral Valve Mitral valve leaflets appear normal. There is mild to moderate (1-2+) mitral regurgitation.  Tricuspid Valve Tricuspid valve leaflets appear normal. There is mild (1+) tricuspid regurgitation. The right ventricular systolic pressure is approximated at 47mmHg plus the right atrial pressure. IVC diameter >2.1 cm collapsing <50% with sniff suggests a high RA pressure estimated at 15 mmHg or greater.  Aortic Valve Moderate aortic valve calcification is present. There is mild (1+) aortic regurgitation. No aortic stenosis is present.  Pulmonic Valve The pulmonic valve is not well seen, but is grossly normal.  Vessels Normal size ascending aorta.  Pericardium There is no pericardial effusion.  ______________________________________________________________________________ MMode/2D Measurements & Calculations IVSd: 1.3 cm LVIDd: 6.3  cm LVIDs: 4.8 cm LVPWd: 1.2 cm FS: 24.7 %  LV mass(C)d: 344.1 grams LV mass(C)dI: 162.0 grams/m2 Ao root diam: 3.3 cm LA dimension: 4.4 cm asc Aorta Diam: 3.8 cm LA/Ao: 1.4 LVOT diam: 2.5 cm LVOT area: 5.1 cm2 LA Volume Indexed (AL/bp): 30.9 ml/m2 RWT: 0.37  Time Measurements MM HR: 77.0 BPM  Doppler Measurements & Calculations MV E max jono: 103.0 cm/sec MV A max jono: 91.7 cm/sec MV E/A: 1.1 MV dec time: 0.25 sec Ao V2 max: 165.9 cm/sec Ao max P.0 mmHg Ao V2 mean: 119.5 cm/sec Ao mean P.6 mmHg Ao V2 VTI: 37.0 cm CASEY(I,D): 2.9 cm2 CASEY(V,D): 3.2 cm2 LV V1 max P.3 mmHg LV V1 max: 103.8 cm/sec LV V1 VTI: 21.3 cm SV(LVOT): 107.6 ml SI(LVOT): 50.7 ml/m2 PA acc time: 0.12 sec TR max jono: 342.4 cm/sec TR max P.9 mmHg AV Jono Ratio (DI): 0.63 CASEY Index (cm2/m2): 1.4 E/E' av.4 Lateral E/e': 8.3 Medial E/e': 18.6  ______________________________________________________________________________ Report approved by: Arianna Reed 2022 12:21 PM       US Upper Extremity Venous Duplex Left    Result Date: 2022  EXAM: US UPPER EXTREMITY VENOUS DUPLEX LEFT LOCATION: [facility] DATE/TIME: 2022 6:18 AM INDICATION: Bruising and swelling. COMPARISON: None. TECHNIQUE: Venous Duplex ultrasound of the left upper extremity with (when possible) and without compression, augmentation, and duplex. Color flow and spectral Doppler with waveform analysis performed. FINDINGS: Ultrasound includes evaluation of the internal jugular vein, innominate vein, subclavian vein, axillary vein, and brachial vein. The superficial cephalic and basilic veins were also evaluated where seen. LEFT: No deep venous thrombosis. No superficial thrombophlebitis. In the left axilla, there is a 6.1 x 4.9 x 3.2 cm heterogeneously hypoechoic focus     IMPRESSION: 1.  No deep venous thrombosis in the left upper extremity. 2.  Probable 6.1 cm left axillary hematoma.    XR Shoulder Left 2 Views    Result Date:  4/1/2022  EXAM: XR SHOULDER 2 VIEW LEFT LOCATION: [facility] DATE/TIME: 4/1/2022 8:38 PM INDICATION: pain , fall a week ago, brusing left arm COMPARISON: None.     IMPRESSION: Normal joint spaces and alignment. No fracture.     CT Abdomen Pelvis w Contrast    Result Date: 4/1/2022  EXAM: CT ABDOMEN PELVIS W CONTRAST LOCATION: [facility] DATE/TIME: 4/1/2022 8:23 PM INDICATION: Anemia and dyspnea. COMPARISON: 10/25/2013. TECHNIQUE: CT scan of the abdomen and pelvis was performed following injection of IV contrast. Multiplanar reformats were obtained. Dose reduction techniques were used. CONTRAST: Isovue 370, 75 mL. FINDINGS: LOWER CHEST: There is a mild to moderate-sized right pleural effusion and a mild left pleural effusion with some bibasilar atelectasis seen, but no central airway obstruction. There is a moderate sized esophageal hiatal hernia. Calcified granuloma left lower lobe. Mild findings of centrilobular emphysema. HEPATOBILIARY: Large gallbladder stone with the gallbladder otherwise normal in appearance with no gallbladder wall thickening or pericholecystic fluid identified. The liver and bile ducts are normal in appearance. PANCREAS: Normal. SPLEEN: Normal. ADRENAL GLANDS: Normal. KIDNEYS/BLADDER: There are 2 benign cysts seen in the right kidney and no follow-up is needed. The kidneys, ureters, and bladder are otherwise normal. BOWEL: There is some localized significant stool seen in the distal colon/rectum with mild findings of obstipation. The bowel is otherwise normal. LYMPH NODES: Normal. VASCULATURE: There is moderate to marked calcification seen, but there is no evidence for aneurysmal dilatation. PELVIC ORGANS: The prostate gland is not seen and presumed to be surgically absent. MUSCULOSKELETAL: Advanced degenerative changes most marked lower lumbar spine.     IMPRESSION: 1.  Obstipation. 2.  Bilateral pleural effusions greatest on the right with  bibasilar atelectasis. 3.  Esophageal hiatal hernia. 4.  Cholelithiasis with the gallbladder otherwise normal in appearance.    CT Chest Pulmonary Embolism w Contrast    Result Date: 4/1/2022  EXAM: CT CHEST PULMONARY EMBOLISM W CONTRAST LOCATION: [facility] DATE/TIME: 4/1/2022 8:23 PM INDICATION: Shortness of breath and arm pain. COMPARISON: 04/19/1918. TECHNIQUE: CT chest pulmonary angiogram during arterial phase injection of IV contrast. Multiplanar reformats and MIP reconstructions were performed. Dose reduction techniques were used. CONTRAST: Isovue 370, 75 mL. FINDINGS: ANGIOGRAM CHEST: Pulmonary arteries are normal caliber and negative for pulmonary emboli. Thoracic aorta is negative for dissection. No CT evidence of right heart strain. LUNGS AND PLEURA: At least moderate emphysema. Small bilateral pleural effusions and associated compressive atelectasis versus less likely infiltrate. MEDIASTINUM/AXILLAE: No adenopathy or aneurysm. Moderate hiatal hernia. CORONARY ARTERY CALCIFICATION: Severe. UPPER ABDOMEN: No acute findings. MUSCULOSKELETAL: No frankly destructive bony lesions.     IMPRESSION: 1.  No pulmonary embolism demonstrated. 2.  Small bilateral pleural effusions and associated probable compressive atelectasis versus less likely infiltrate. 3.  At least moderate emphysema. 4.  Moderate hiatal hernia.    CT Head w/o Contrast    Result Date: 4/1/2022  EXAM: CT HEAD W/O CONTRAST LOCATION: [facility] DATE/TIME: 4/1/2022 8:22 PM INDICATION: Fall. Trauma. Pain. COMPARISON: None. TECHNIQUE: Routine CT Head without IV contrast. Multiplanar reformats. Dose reduction techniques were used. FINDINGS: INTRACRANIAL CONTENTS: No intracranial hemorrhage, extraaxial collection, or mass effect.  No CT evidence of acute infarct. Mild presumed chronic small vessel ischemic changes. Moderate generalized volume loss. No hydrocephalus. VISUALIZED ORBITS/SINUSES/MASTOIDS: No  intraorbital abnormality. No paranasal sinus mucosal disease. No middle ear or mastoid effusion. BONES/SOFT TISSUES: No acute abnormality.     IMPRESSION: 1.  No CT evidence for acute intracranial process. 2.  Brain atrophy and presumed chronic microvascular ischemic changes as above.       Signed by: Mario Leon MD

## 2022-04-02 NOTE — PROGRESS NOTES
Verbal order from Dr. Bianchi to give 2 units PRBCs first then do a 1 hour post transfusion Hgb recheck.  If Hgb < 7, transfuse 3rd unit PRBC.  If Hgb > 7, hold 3rd Unit PRBC.

## 2022-04-02 NOTE — CONSULTS
ORTHOPEDIC CONSULTATION    CHIEF COMPLAINT: left shoulder weakness       HISTORY OF PRESENT ILLNESS:  The patient is seen in orthopedic consultation at the request of Aminata Carpio MD.  The patient is a 86 year old male with c/o left shoulder weakness.  Patient has noted left shoulder weakness over the past few days.  Initially he denies a fall, but per chart review, he did have a fall in a parking lot about 1 week ago.  He has extensive bruising and ecchymosis over his left arm.  He really has no pain, and his main complaint is that he cannot lift his left shoulder.  Patient has a history of COPD, prostate cancer, hypertension, and diabetes mellitus.  He presented with 30 pound unintentional weight loss and severe anemia down to 4.6.  He has been admitted to the hospitalist service, and transfused with packed red blood cells.  He reports he is feeling somewhat better this morning.  He has not had an issue with his shoulder in the past.  He denies any numbness or tingling in the distal extremity.  He is right-hand dominant.          PAST MEDICAL HISTORY:    COPD  Prostate cancer  Hypertension  Diabetes mellitus    ALLERGIES:    No Known Allergies     MEDICATIONS ON ADMISSION:  Medications were reviewed.  They do include:   Medications Prior to Admission   Medication Sig Dispense Refill Last Dose     ibuprofen (ADVIL/MOTRIN) 200 MG tablet Take 200 mg by mouth daily   4/1/2022 at Unknown time     OMEGA-3/DHA/EPA/FISH OIL (FISH OIL-OMEGA-3 FATTY ACIDS) 300-1,000 mg capsule [OMEGA-3/DHA/EPA/FISH OIL (FISH OIL-OMEGA-3 FATTY ACIDS) 300-1,000 MG CAPSULE] Take 2 g by mouth daily.   4/1/2022 at Unknown time     Vitamin D, Cholecalciferol, 25 MCG (1000 UT) TABS Take 25 mcg by mouth daily   4/1/2022 at Unknown time     vitamin E 400 unit capsule [VITAMIN E 400 UNIT CAPSULE] Take 400 Units by mouth daily.   4/1/2022 at Unknown time     acetaminophen (TYLENOL) 325 MG tablet [ACETAMINOPHEN (TYLENOL) 325 MG TABLET] Take 2  tablets (650 mg total) by mouth every 4 (four) hours as needed.  0      albuterol (PROAIR HFA;PROVENTIL HFA;VENTOLIN HFA) 90 mcg/actuation inhaler [ALBUTEROL (PROAIR HFA;PROVENTIL HFA;VENTOLIN HFA) 90 MCG/ACTUATION INHALER] Inhale 2 puffs every 6 (six) hours as needed for wheezing or shortness of breath. 1 Inhaler 11      amLODIPine (NORVASC) 5 MG tablet [AMLODIPINE (NORVASC) 5 MG TABLET] Take 1 tablet (5 mg total) by mouth daily. 30 tablet 0      aspirin 81 MG EC tablet [ASPIRIN 81 MG EC TABLET] Take 81 mg by mouth at bedtime.        atorvastatin (LIPITOR) 20 MG tablet [ATORVASTATIN (LIPITOR) 20 MG TABLET] Take 20 mg by mouth at bedtime.        fluticasone (FLONASE ALLERGY RELIEF) 50 mcg/actuation nasal spray [FLUTICASONE (FLONASE ALLERGY RELIEF) 50 MCG/ACTUATION NASAL SPRAY] One spray in each nostril daily as needed 16 g 12      glucosamine sulfate 500 mg cap [GLUCOSAMINE SULFATE 500 MG CAP] Take 1,000 mg by mouth daily.        lisinopril (PRINIVIL,ZESTRIL) 20 MG tablet [LISINOPRIL (PRINIVIL,ZESTRIL) 20 MG TABLET] Take 20 mg by mouth daily.        loratadine 10 mg cap [LORATADINE 10 MG CAP] Take 10 mg by mouth daily. 30 each 11      metFORMIN (GLUCOPHAGE) 1000 MG tablet [METFORMIN (GLUCOPHAGE) 1000 MG TABLET] Take 1,000 mg by mouth 2 (two) times a day with meals.        multivitamin therapeutic tablet [MULTIVITAMIN THERAPEUTIC TABLET] Take 1 tablet by mouth daily.        omeprazole (PRILOSEC) 20 MG capsule [OMEPRAZOLE (PRILOSEC) 20 MG CAPSULE] Take 20 mg by mouth daily.        predniSONE (DELTASONE) 20 MG tablet [PREDNISONE (DELTASONE) 20 MG TABLET] Take 1 tablet (20 mg total) by mouth daily with breakfast. 4 tablet 0        SOCIAL HISTORY:   Social History     Tobacco Use     Smoking status: Former Smoker     Types: Cigarettes, Cigarettes     Quit date: 1983     Years since quittin.2     Smokeless tobacco: Former User   Substance Use Topics     Alcohol use: No     Drug use: No        FAMILY  HISTORY:  Family History   Problem Relation Age of Onset     Sleep Apnea Child        REVIEW OF SYSTEMS:   See Admission History and Physical     PHYSICAL EXAMINATION:    Temp:  [97.5  F (36.4  C)-98.4  F (36.9  C)] 97.5  F (36.4  C)  Pulse:  [] 76  Resp:  [18-26] 20  BP: (111-161)/(51-72) 161/65  SpO2:  [91 %-98 %] 95 %    General: On examination, the patient is alert and interactive, and he answers questions appropriately.  HEENT: Normal  Neuro: No focal deficits  Psych: Normal affect, nonpressured speech  Respiratory: Breathing unlabored  Cardiovascular: Extremities are warm and well-perfused.  Left upper extremity: Focused examination of the left upper extremity reveals extensive ecchymosis of the left arm and forearm.  The compartments are all soft and compressible.  There is no tense fluid collection or hematoma.  There is no tenderness in the axilla.  The patient has difficulty initiating forward flexion of his shoulder.  He uses his other arm to help him, and then he uses his deltoid to complete the remainder of forward flexion, and is able to hold his arm over his head.  He has weakness for internal and external rotation of the left shoulder.  He has no pain to palpation about the shoulder, and no pain with passive range of motion of the left shoulder.  There is no tenderness to palpation over the shoulder, the humerus, the elbow, the forearm, the wrist, or the hand.  Patient has 5/5 strength for elbow flexion, elbow extension, pronation, supination.  He fires EPL/FPL/IO without difficulty.  Sensation is intact light touch throughout the median/radial/ulnar/axillary nerve distributions.  He fires his deltoid easily.  He has palpable radial pulse, and his fingers are warm and well-perfused.    RADIOGRAPHIC EVALUATION:  AP and scapular Y view of the left shoulder are personally reviewed.  X-ray views are suboptimal, but on these views no fracture is noted.  There is superior migration of the humeral  head suggestive of rotator cuff tear.  There is no lytic or sclerotic lesion.    The radiologist read of an ultrasound obtained of the left upper extremity reveals no deep venous thrombosis, and a 6.1 cm left axillary hematoma.      LABORATORY DATA:   Lab Results   Component Value Date    INR 1.24 (H) 04/01/2022       IMPRESSION:   86-year-old male with a fall 1 week ago with extensive left arm ecchymosis.  There is no evidence of tense hematoma, continued bleeding, and no compartment syndrome.  He has pseudoparalysis of his left shoulder consistent with a massive rotator cuff tear.  He has superior migration of the humeral head consistent with this diagnosis.  The x-ray views are somewhat suboptimal, but I have low suspicion for fracture given he has no pain on exam today.  I do not think the extensive bruising in his arm is the cause of his anemia.     PLAN:  The patient has significant medical issues and is currently undergoing work-up.  With regards to his likely left shoulder rotator cuff tear, I discussed with him that there is no urgent intervention needed.  He continue weightbearing as tolerated, and can work with physical therapy on mobilizing his left shoulder and working on active and passive range of motion.  Once he is discharged from the hospital if he is still having difficulty with his left shoulder, he can follow-up as an outpatient to address his left shoulder.    If there is further concern or change in the exam, please feel free to reach out.  Thank you for involving [facility] in the patient's care.  Orthopedics will sign off and follow peripherally.    Garry Xavier MD  [facility]

## 2022-04-02 NOTE — PROGRESS NOTES
"CLINICAL NUTRITION SERVICES - ASSESSMENT NOTE     Nutrition Prescription    RECOMMENDATIONS FOR MDs/PROVIDERS TO ORDER:  Daily multivitamin with minerals if appropriate    Malnutrition Status:    TBD    Recommendations already ordered by Registered Dietitian (RD):      Future/Additional Recommendations:  Need nutrition hx, npfa, and will follow up with pt for supplement preferences     REASON FOR ASSESSMENT  Solitario Benjamin is a/an 86 year old male assessed by the dietitian for Admission Nutrition Risk Screen for eating poorly with weight loss.    Per chart, pt witha history of COPD, prostate cancer, hypertension, and diabetes mellitus.  He presented with 30 pound unintentional weight loss and severe anemia down to 4.6.  Fall in parking lot one week ago, pt c/p shoulder weakness.    NUTRITION HISTORY  Pt is currently busy, eating poorly PTA per risk screen.    CURRENT NUTRITION ORDERS  Diet: Regular  Intake/Tolerance: Drinking fluids, 100% lunch    LABS  Labs reviewed  Bili, t 1.4 H, Troponin 0.69 H, Hgb 7.0  FSBG 102      MEDICATIONS  Medications reviewed   novolog, pantoprazole, senna- docusate    ANTHROPOMETRICS  Height: 185.4 cm (6' 1\")  Most Recent Weight: 88.2 kg (194 lb 6.4 oz)  3.4% loss in one week  IBW: 83.6 kg  BMI: Overweight BMI 25-29.9  Weight History:   Wt Readings from Last 3 Encounters:   04/02/22 88.2 kg (194 lb 6.4 oz)   02/12/19 109.8 kg (242 lb)   12/11/18 109.8 kg (242 lb)   03/17/21 201 lb  9/16/20 219 lb  3/16/21 228 lb  3/11/19 240 lb    Dosing Weight: 85.9 kg adjusted    ASSESSED NUTRITION NEEDS  Estimated Energy Needs: 2150- 2580 kcals/day (25 - 30 kcals/kg)  Justification: Maintenance and Overweight  Estimated Protein Needs:  grams protein/day (1 - 1.2 grams of pro/kg)  Justification: Repletion  Estimated Fluid Needs: 2150 mL/day (25 mL/kg), or per provider  Justification: Maintenance with CHF    PHYSICAL FINDINGS  See malnutrition section below.  Per chart,  Skin -scab left " shin  Edema - +3 left arm, +1 bilat lower extremities    MALNUTRITION:  % Weight Loss:  > 2% in 1 week (severe malnutrition)  % Intake:    Need nutrition history  Subcutaneous Fat Loss:  Unable to assess  Muscle Loss:  Unable to assess  Fluid Retention:  +1-+3    Malnutrition Diagnosis: Unable to determine due to need nutrition hx and nfpa      NUTRITION DIAGNOSIS  Unintended weight loss related to acute on chronic illness as evidenced by 10.9% weight loss in six months, 3.4% in 7 days      INTERVENTIONS  Implementation  Will follow up to discuss supplements   Suggest daily multivitamin with minerals, if appropriate    Goals  Patient to consume % of nutritionally adequate meals three times per day, or the equivalent with supplements/snacks.     Monitoring/Evaluation  Progress toward goals will be monitored and evaluated per protocol.

## 2022-04-02 NOTE — CONSULTS
[facility]  Cardiac Electrophysiology     Consultation Note: Aayush Sen MD    Primary Care: Yogi Dueñas      Thank you, Dr. Saldana, for asking the [facility] team to see Mr. Solitario Benjamin to evaluate acute on chronic systolic heart failure.      Assessment/Recommendations   Assessment:      Acute on chronic systolic heart failure: This is a new diagnosis for the patient who previously demonstrated normal left ventricular systolic performance by echo in 2018.  The patient's echocardiogram from today demonstrates significant loss of left ventricular systolic performance with LVEF of 25-30%.  Segmental wall motion abnormality is present with akinesis of the inferolateral wall.  The patient's symptoms developed approximately 6 months ago and led to a progressive loss of activity due to ALVARADO.  The patient demonstrates mild dyspnea at rest.  The patient is not an optimal candidate for stress imaging study and I have recommended that he consider going directly with coronary angiography.    Troponin elevation not due to acute coronary syndrome: The patient appears to have demand ischemia based on his blood pressure, heart rate, and newly diagnosed loss of systolic performance.    Primary hypertension: The patient carries this diagnosis but apparently has not been taking his medications for greater than 12 months.  Blood pressure today remains elevated, primarily systolic.    Apparent shoulder injury: With acute onset of ecchymosis and swelling of the left arm.  Evaluation is ongoing by the orthopedic service.      Plan:    Discontinue amlodipine.    Initiate low-dose metoprolol succinate and hydralazine    Initiate ASA 81 mg daily    IV diuretics x3 doses and then reassess his clinical status    No intravenous anticoagulation at this time.       History of Present Illness/Subjective    Mr. Solitario Benjamin is a 86 year old male with strong family history for  coronary atherosclerosis but no prior history himself.  The patient reports that he developed symptoms of dyspnea on exertion 6 months ago.  He does not recall any particular event which precipitated those symptoms but noticed a progression of degrees of activity which would then bring on his symptoms of dyspnea.  He reports no orthopnea or lower extremity edema.  He reports no chest pressure heaviness associated with his events.  He is not had any sustained tachypalpitations.  He reports no severe presyncope or syncope.  The patient ended up presenting to the hospital after awakening on Thursday with significant swelling and ecchymosis of his left upper arm and elbow region.  He states that he is not had any injury or fall.    ECG: Personally reviewed: Twelve-lead EKG from today demonstrates normal sinus rhythm with normal intervals.  PVCs are present.  Inferior Q waves suggesting prior inferior wall myocardial infarction and no acute ST or T wave abnormalities.    ECHO: Study from April 2, 2022                                              Interpretation Summary     Left ventricular function is decreased. The ejection fraction is 25-30%  (severely reduced).  There is mod-severe global hypokinesia of the left ventricle.  There is inferior & inferolateral wall akinesis.  Right ventrical function, chamber size, wall motion, and thickness are normal.  The left atrium is mildly dilated.  The right atrium is mildly dilated.  The right ventricular systolic pressure is approximated at 47mmHg plus the  right atrial pressure.  IVC diameter >2.1 cm collapsing <50% with sniff suggests a high RA pressure  estimated at 15 mmHg or greater.    Other Studies:     Time spent: 65 minutes spent on the date of the encounter doing chart review, history and exam, documentation and further activities as noted above.    Clinically Significant Risk Factors Present on Admission            # Hypoalbuminemia: Albumin = 3.3 g/dL (Ref range: 3.5  "- 5.0 g/dL) on admission, will monitor as appropriate   # Coagulation Defect: INR = 1.24 (Ref range: 0.90 - 1.15) and/or PTT = 30 Seconds (Ref range: 22 - 38 Seconds) on admission, will monitor for bleeding  # Platelet Defect: home medication list includes an antiplatelet medication   # Overweight: Estimated body mass index is 25.65 kg/m  as calculated from the following:    Height as of this encounter: 1.854 m (6' 1\").    Weight as of this encounter: 88.2 kg (194 lb 6.4 oz).    Systemic: Chronic Fatigue and Other Debilities: Other reduced mobility            Physical Examination Review of Systems   BP (!) 146/67 (BP Location: Right arm)   Pulse 94   Temp 98  F (36.7  C) (Oral)   Resp 18   Ht 1.854 m (6' 1\")   Wt 88.2 kg (194 lb 6.4 oz)   SpO2 94%   BMI 25.65 kg/m    Body mass index is 25.65 kg/m .  Wt Readings from Last 3 Encounters:   04/02/22 88.2 kg (194 lb 6.4 oz)   02/12/19 109.8 kg (242 lb)   12/11/18 109.8 kg (242 lb)       Intake/Output Summary (Last 24 hours) at 4/2/2022 1404  Last data filed at 4/2/2022 1100  Gross per 24 hour   Intake 1385 ml   Output 1150 ml   Net 235 ml       General     Appearance:   The patient is alert oriented to person place and situation.    The patient is in mild respiratory distress at the time of my evaluation.   HEENT:  Pupils are equal, round, and reactive to light.  Conjunctiva and sclera are clear.  ENT: Oral mucosa is moist and without redness. No evident nasal discharge.   Neck: Without palpable thyroid or appreciable lymph nodes.   Chest: Symmetric, without deformity.   Lungs:    Notable for scant rales in the bases and scattered rhonchi but no wheezes.       Cardiovascular:   Rhythm is regular with occasional ectopic beats. S1 and S2 are normal. No significant murmur is present. JVP is elevated.  Lower extremities demonstrate trace edema bilaterally.  Distal pulses are intact bilaterally.   Abdomen:  Abdomen is flat, soft, and nontender.   Extremities: Without " deformity.   Skin: Skin is warm, dry, and otherwise intact.   Neurologic: Gait is not observed as the patient is at bedrest.             A 12 point comprehensive review of systems was negative except as noted.      Medical History  Surgical History Family History Social History   Past Medical History:   Diagnosis Date     Accelerated hypertension 2018     Acute on chronic systolic heart failure (H) 2022     Centrilobular emphysema (H) 2022     Type 2 diabetes mellitus without complication, without long-term current use of insulin (H) 2018    History reviewed. No pertinent surgical history. Family History   Problem Relation Age of Onset     Myocardial Infarction Mother      Myocardial Infarction Father      Myocardial Infarction Sister      Myocardial Infarction Brother      Sleep Apnea Child     Social History     Socioeconomic History     Marital status:      Spouse name: Not on file     Number of children: 3     Years of education: Not on file     Highest education level: Not on file   Occupational History     Occupation: Retired     Comment: Grocery store truck delivery   Tobacco Use     Smoking status: Former Smoker     Types: Cigarettes, Cigarettes     Quit date: 1983     Years since quittin.2     Smokeless tobacco: Former User   Substance and Sexual Activity     Alcohol use: No     Drug use: No     Sexual activity: Not Currently   Other Topics Concern     Not on file   Social History Narrative     Not on file     Social Determinants of Health     Financial Resource Strain: Not on file   Food Insecurity: Not on file   Transportation Needs: Not on file   Physical Activity: Not on file   Stress: Not on file   Social Connections: Not on file   Intimate Partner Violence: Not on file   Housing Stability: Not on file          Medications  Allergies   Scheduled Meds:    aspirin  81 mg Oral Daily     - MEDICATION INSTRUCTIONS -   Does not apply See Admin Instructions     furosemide  20  mg Intravenous Q6H     hydrALAZINE  12.5 mg Oral Q8H STEPHANE     insulin aspart  1-3 Units Subcutaneous TID AC     insulin aspart  1-3 Units Subcutaneous At Bedtime     loratadine  10 mg Oral Daily     metoprolol succinate ER  25 mg Oral Daily     pantoprazole (PROTONIX) IV  40 mg Intravenous Q24H     senna-docusate  1 tablet Oral BID     sodium chloride (PF)  3 mL Intracatheter Q8H     Continuous Infusions:  PRN Meds:.albuterol, albuterol, glucose **OR** dextrose **OR** glucagon, lidocaine 4%, lidocaine (buffered or not buffered), polyethylene glycol, sodium chloride (PF) No Known Allergies      Lab Results    Chemistry/lipid CBC Cardiac Enzymes/BNP/TSH/INR   Lab Results   Component Value Date    CHOL 112 03/17/2021    HDL 45 03/17/2021    TRIG 101 03/17/2021    BUN 20 04/02/2022     04/02/2022    CO2 19 (L) 04/02/2022    Lab Results   Component Value Date    WBC 7.1 04/02/2022    HGB 7.8 (L) 04/02/2022    HCT 25.1 (L) 04/02/2022    MCV 74 (L) 04/02/2022     04/02/2022    Lab Results   Component Value Date    TROPONINI 0.95 (HH) 04/02/2022     (H) 04/01/2022    TSH 6.98 (H) 04/01/2022    INR 1.24 (H) 04/01/2022           Aayush Sen MD  Clinical Cardiac Electrophysiologist  [facility]

## 2022-04-02 NOTE — H&P
[facility]    History and Physical - Hospitalist Service       Date of Admission:  4/1/2022    Assessment & Plan      Solitario Benjamin is a 86 year old male admitted on 4/1/2022. He has a hx of copd(not on O2), prostate cancer, htn, dm who has been off of all of his meds for at least 6 weeks and comes in here with sob, severe anemia, 30 pound wt loss, and extensive bruising      1.Sob  -most likely now related to severe anemia of 4  -transfuse now  -serial trops  -tele  -echo    2.Severe anemia  -not clear if gi loss, with bruising I am concerned there could be heme/onc issue here  -transfuse--I had to get consent--2 units to start then check hgb, if <7 then give third one  -serial hgb  -no sign so far of gib  -no gerd now, no melena  -CT chest/abd/pelvis now  -send off periph smear, LDH, uric acid, haptoglobin  -Oncology to see  -GI to see maria guadalupe with wt loss too    3.hx of copd  -not on meds and no O2  -checking CTA chest now    4.hx of prostate cancer  -s/p surgery only    5.hx of wt loss  -30 pounds  -with age, and above I am very worried about malignancy here  -check TSH  -work up above, pan CT  -onc/gi to see    6.Fall a week ago  -ct head  -fall precautions    7.Bruising on arm  -check ultrasound  -check xray of left shoulder --with fall last week could have fx in upper arm/shoulder  -check vit c level    8.hx of DM  -check a1c  -sliding scale  -off meds    9.Hx of htn  -hold meds  -has been off weeks    10.Memory loss  -per daughter --months  -check ct head  -check tsh  -check b12    11.DVT prevent  -hold due to issues    12. Code-DNR/DNI        Disposition Plan   Expected Discharge:days       The patient's care was discussed with the Patient and Patient's Family.    Veronica Bianchi MD  Hospitalist Service  [facility]  Securely message with the Vocera Web Console (learn more here)  Text page via [place] Paging/Directory          ______________________________________________________________________    Chief Complaint   sob    History is obtained from the patient    History of Present Illness     Solitario Benjamin is a 86 year old male admitted on 4/1/2022. He has a hx of copd(not on O2), prostate cancer, htn, dm who has been off of all of his meds for at least 6 weeks and comes in here with sob, severe anemia, 30 pound wt loss, and extensive bruising    First, he comes in with worsening sob x 1 week. No cough no fever  He notes more sob with exertion, no cp  He notes no use of O2 at home with copd    Second he started having severe bruising on left arm today and has no idea why--he denied trauma --but then daughter reminded him he had a fall in parking lot a week ago    Third wt loss of 30 pounds --not trying    Finally--he has been off all meds x 6 weeks--he notes he tried to refill and they would not. He has only been taking supplements    In terms of supplements--takes Vit e, takes ibuprofen once a day, no asa    Denied melena, or BRBPR, no recent gerd          Review of Systems    CONSTITUTIONAL:  positive for  fatigue and weight loss  EYES:  negative  HEENT:  negative  RESPIRATORY:  positive for  dyspnea  CARDIOVASCULAR:  positive for  dyspnea  GASTROINTESTINAL:  negative  GENITOURINARY:  negative  INTEGUMENT/BREAST:  positive for bruising more  HEMATOLOGIC/LYMPHATIC:  positive for easy bruising  ALLERGIC/IMMUNOLOGIC:  negative  ENDOCRINE:  positive for dm off meds  MUSCULOSKELETAL:  negative  NEUROLOGICAL:  positive for memory problems  BEHAVIOR/PSYCH:  negative    Past Medical History    Copd, not on O2  Htn--off meds 6 weeks  DM--off meds 6 weeks  Lipids--off meds 6 weeks    Past Surgical History   Prostate cancer  Hernia    Social History   I have reviewed this patient's social history and updated it with pertinent information if needed.  Social History     Tobacco Use     Smoking status: Former Smoker     Types: Cigarettes,  Cigarettes     Quit date: 1983     Years since quittin.2     Smokeless tobacco: Former User   Substance Use Topics     Alcohol use: No     Drug use: No   lives alone    Family History   I have reviewed this patient's family history and updated it with pertinent information if needed.  Family History   Problem Relation Age of Onset     Sleep Apnea Child    no cancers in family    Prior to Admission Medications   Prior to Admission Medications   Prescriptions Last Dose Informant Patient Reported? Taking?   OMEGA-3/DHA/EPA/FISH OIL (FISH OIL-OMEGA-3 FATTY ACIDS) 300-1,000 mg capsule   Yes No   Sig: [OMEGA-3/DHA/EPA/FISH OIL (FISH OIL-OMEGA-3 FATTY ACIDS) 300-1,000 MG CAPSULE] Take 2 g by mouth daily.   acetaminophen (TYLENOL) 325 MG tablet   No No   Sig: [ACETAMINOPHEN (TYLENOL) 325 MG TABLET] Take 2 tablets (650 mg total) by mouth every 4 (four) hours as needed.   albuterol (PROAIR HFA;PROVENTIL HFA;VENTOLIN HFA) 90 mcg/actuation inhaler   No No   Sig: [ALBUTEROL (PROAIR HFA;PROVENTIL HFA;VENTOLIN HFA) 90 MCG/ACTUATION INHALER] Inhale 2 puffs every 6 (six) hours as needed for wheezing or shortness of breath.   amLODIPine (NORVASC) 5 MG tablet   No No   Sig: [AMLODIPINE (NORVASC) 5 MG TABLET] Take 1 tablet (5 mg total) by mouth daily.   aspirin 81 MG EC tablet   Yes No   Sig: [ASPIRIN 81 MG EC TABLET] Take 81 mg by mouth at bedtime.   atorvastatin (LIPITOR) 20 MG tablet   Yes No   Sig: [ATORVASTATIN (LIPITOR) 20 MG TABLET] Take 20 mg by mouth at bedtime.   fluticasone (FLONASE ALLERGY RELIEF) 50 mcg/actuation nasal spray   No No   Sig: [FLUTICASONE (FLONASE ALLERGY RELIEF) 50 MCG/ACTUATION NASAL SPRAY] One spray in each nostril daily as needed   glucosamine sulfate 500 mg cap   Yes No   Sig: [GLUCOSAMINE SULFATE 500 MG CAP] Take 1,000 mg by mouth daily.   lisinopril (PRINIVIL,ZESTRIL) 20 MG tablet   Yes No   Sig: [LISINOPRIL (PRINIVIL,ZESTRIL) 20 MG TABLET] Take 20 mg by mouth daily.   loratadine 10 mg cap    No No   Sig: [LORATADINE 10 MG CAP] Take 10 mg by mouth daily.   metFORMIN (GLUCOPHAGE) 1000 MG tablet   Yes No   Sig: [METFORMIN (GLUCOPHAGE) 1000 MG TABLET] Take 1,000 mg by mouth 2 (two) times a day with meals.   multivitamin therapeutic tablet   Yes No   Sig: [MULTIVITAMIN THERAPEUTIC TABLET] Take 1 tablet by mouth daily.   omeprazole (PRILOSEC) 20 MG capsule   Yes No   Sig: [OMEPRAZOLE (PRILOSEC) 20 MG CAPSULE] Take 20 mg by mouth daily.   predniSONE (DELTASONE) 20 MG tablet   No No   Sig: [PREDNISONE (DELTASONE) 20 MG TABLET] Take 1 tablet (20 mg total) by mouth daily with breakfast.   vitamin E 400 unit capsule   Yes No   Sig: [VITAMIN E 400 UNIT CAPSULE] Take 400 Units by mouth daily.      Facility-Administered Medications: None     Allergies   No Known Allergies    Physical Exam   Vital Signs: Temp: 97.7  F (36.5  C) Temp src: Oral BP: 121/56 Pulse: 85   Resp: 26 SpO2: 95 %      Weight: 0 lbs 0 oz    Constitutional: awake, fatigued, alert, cooperative, no apparent distress and appears stated age  Eyes: sclera clear  ENT: normocepalic, without obvious abnormality  Hematologic / Lymphatic: no cervical lymphadenopathy  Respiratory: no increased work of breathing, good air exchange, no retractions and clear to auscultation  Cardiovascular: Normal apical impulse, regular rate and rhythm, normal S1 and S2, no S3 or S4, and no murmur noted  GI: normal bowel sounds, soft, non-distended and non-tender  Skin: ecchymosis left arm   Musculoskeletal: no lower extremity pitting edema present  Neurologic: Mental Status Exam:  Level of Alertness:   awake  Attention/Concentration:  normal  Language:  normal  Motor Exam:  Moves all ext well except left upper arm  Neuropsychiatric: General: normal, calm and normal eye contact    Data   Data reviewed today: I reviewed all medications, new labs and imaging results over the last 24 hours. I personally reviewed images and labs    Recent Labs   Lab 04/01/22  1838   WBC 8.1   HGB  4.6*   MCV 63*      INR 1.24*      POTASSIUM 3.9   CHLORIDE 106   CO2 18*   BUN 21   CR 1.22   ANIONGAP 12   GUILLE 8.9   *   ALBUMIN 3.4*   PROTTOTAL 5.9*   BILITOTAL 1.0   ALKPHOS 62   ALT 13   AST 25     No results found for this or any previous visit (from the past 24 hour(s)).

## 2022-04-02 NOTE — PROGRESS NOTES
reported that she can not draw troponin at 0030 because he is currently getting transfused with PRBC.   reported she will draw it once patient is completed with transfusion.  Dr. Pascual , notified.

## 2022-04-02 NOTE — PLAN OF CARE
Problem: Anemia  Goal: Anemia Symptom Improvement  Outcome: Ongoing, Progressing  Intervention: Monitor and Manage Anemia  Recent Flowsheet Documentation  Taken 4/1/2022 2339 by Juan Diego Gross, RN  Safety Promotion/Fall Prevention:   bed alarm on   clutter free environment maintained   fall prevention program maintained   increased rounding and observation   increase visualization of patient   mobility aid in reach   lighting adjusted   nonskid shoes/slippers when out of bed   patient and family education   room door open   room organization consistent   safety round/check completed  Taken 4/1/2022 2126 by Juan Diego Gross, RN  Safety Promotion/Fall Prevention:   bed alarm on   clutter free environment maintained   fall prevention program maintained   increased rounding and observation   increase visualization of patient   mobility aid in reach   lighting adjusted   nonskid shoes/slippers when out of bed   patient and family education   room door open   room organization consistent   safety round/check completed   Goal Outcome Evaluation:  2 Units PRBC transfused, no signs and symptoms of transfusion reaction. Hgb recheck this AM at 0500. Will transfuse if Hgb < 7 per MD orders.   No signs of bleeding noted.  Voiding well.  ALVARADO noted, sating well on RA.

## 2022-04-02 NOTE — PROGRESS NOTES
"[facility] Hospitalist Progress Note  [facility]  Summary:    86M with copd, prostate cancer, htn, dm who has been off of all of his meds for at least 6 weeks and presents with SOB and 30lb weight loss and is found to have severe anemia (hb 4.6) and 6.1cm L shoulder hematoma.    Assessment/Plan    #acute blood loss anemia due to left shoulder hematoma  #microcytic anemia  #inappropriate marrow response (low retic) -   -transfuse another unit (3rd unit) for Hb 7.0 and elevated trop.  -check iron studies and start replacement if needed  -no overt GI blood loss reported.   will need to be uptodate on c-scope.    -GI and oncology have already been consulted.    #demand NSTEMI (type II) - due to severe anemia.  No pain or EKG changes.  ECHO pending.  Cards     #Bilateral shoulder weakness - isolated to shoulders.  No neck pain.  L arm ecchymosis.  All suggestive of rotator cuff injuries.    -ortho consult.    #copd - no exacerbation. Not on maintenance meds    #30 lb weight loss - no clear malignancy on CT CAP.  Should be up to date on c-scope as above.    #Falls - PT/OT.  Fall precautions.       #HTN - resume norvasc, lisinopril on hold for now post contrast    #Memory loss  -per daughter --months  -check ct head  -check tsh  -check b12     -hx of prostate cancer  -hx of DM - A1c 5.7.  Not on meds  -mildly elevated TSH - outpatient f/u       Clinically Significant Risk Factors Present on Admission             # Hypoalbuminemia: Albumin = 3.3 g/dL (Ref range: 3.5 - 5.0 g/dL) on admission, will monitor as appropriate   # Coagulation Defect: INR = 1.24 (Ref range: 0.90 - 1.15) and/or PTT = 30 Seconds (Ref range: 22 - 38 Seconds) on admission, will monitor for bleeding  # Platelet Defect: home medication list includes an antiplatelet medication   # Overweight: Estimated body mass index is 25.65 kg/m  as calculated from the following:    Height as of this encounter: 1.854 m (6' 1\").    Weight as of this " encounter: 88.2 kg (194 lb 6.4 oz).          Checklist:  Code Status: No CPR- Do NOT Intubate    Diet: Combination Diet Regular Diet Adult    [place] Catheter: Not present  Central Lines: None  DVT px:  ambulate for now.  No heparinoid for now due to blood loss anemia, hold on SCD for fall risk     Expected Discharge:   Anticipated discharge location:  Awaiting care coordination huddle  Delays:        Expected discharge: likely a few days.  Anticipate will need TCU    Overnight Events/Subjective/Notable results:  Trop slightly elevated this AM 0.56.  No chest pain.  EKG without ischemic changes (personally reviewed)  Has mild ALVARADO  Bilateral shoulder weakness reports new in last few weeks  Not able to give detailed hx    4 point ROS otherwise negative    Objective    Vital signs in last 24 hours  Temp:  [97.5  F (36.4  C)-98.4  F (36.9  C)] 97.8  F (36.6  C)  Pulse:  [] 96  Resp:  [18-26] 20  BP: (111-151)/(51-72) 150/64  SpO2:  [91 %-98 %] 95 % O2 Device: None (Room air)    Weight:   194 lbs 6.4 oz    Intake/Output last 3 shifts  I/O last 3 completed shifts:  In: 1060 [P.O.:360]  Out: 1000 [Urine:1000]  Body mass index is 25.65 kg/m .    Physical Exam  General:  Alert, cooperative, no distress,  Appears stated age  Neurologic:   facialsymmetry preserved, fluent speech.   Psych: calm, mood and affect appropriate to situation  HEENT:  Anicteric, MMM, unremarkable dentition  CV: RRR no MRG, normal S1 and S2, no edema  Lungs: CTAB.  Easyrespirations  Abd: soft, NT, normoactive BS  Skin: no rashes noted on exposed skin. Color and turgor normal  MSK: L shoulder/arm ecchymosis.  Bilateral shoulder weakness,  Good  strength  Central Lines and Tubes: None (no [place], CVC, feeding tubes)      I have reviewed all labs, medications, imaging studies in the last 24 hours.  Pertinent findings&changes discussed above.    Data         Aminata Carr MD  Internal Medicine Hospitalist

## 2022-04-02 NOTE — PHARMACY-ADMISSION MEDICATION HISTORY
Pharmacy Note - Admission Medication History    Pertinent Provider Information:  Patient reports no current Rx medications. He states he is taking vitamins D & E, Fish oil and one other supplement he cannot remember. (Family will find out for sure and bring in list.)  At first, he said he was taking a daily aspirin, but then said he is taking one ibuprofen tablet daily.   ______________________________________________________________________    Prior To Admission (PTA) med list completed and updated in EMR.       PTA Med List   Medication Sig Last Dose     ibuprofen (ADVIL/MOTRIN) 200 MG tablet Take 200 mg by mouth daily 4/1/2022 at Unknown time     OMEGA-3/DHA/EPA/FISH OIL (FISH OIL-OMEGA-3 FATTY ACIDS) 300-1,000 mg capsule [OMEGA-3/DHA/EPA/FISH OIL (FISH OIL-OMEGA-3 FATTY ACIDS) 300-1,000 MG CAPSULE] Take 2 g by mouth daily. 4/1/2022 at Unknown time     Vitamin D, Cholecalciferol, 25 MCG (1000 UT) TABS Take 25 mcg by mouth daily 4/1/2022 at Unknown time     vitamin E 400 unit capsule [VITAMIN E 400 UNIT CAPSULE] Take 400 Units by mouth daily. 4/1/2022 at Unknown time       Information source(s): Patient and Family member  Method of interview communication: in-person    Summary of Changes to PTA Med List  New: vit D, ibuprofen  Discontinued: none (left old medications on list for informational purposes)  Changed: none    Patient was asked about OTC/herbal products specifically.  PTA med list reflects this.    In the past week, patient estimated taking medication this percent of the time:  less than 50% due to running out.    Allergies were reviewed, assessed, and updated with the patient.      Patient does not use any multi-dose medications prior to admission.    The information provided in this note is only as accurate as the sources available at the time of the update(s).    Thank you,  Nano Perla, [facility]  4/1/2022 9:07 PM

## 2022-04-02 NOTE — CONSULTS
[facility] DIGESTIVE HEALTH CONSULTATION    Solitario Benjamin    [address]   [facility] [address]  86 year old male  Admission Date/Time: 2022  6:15 PM    Primary Care Provider:  Yogi Saldana    Requesting Physician: Aminata Carr MD      CHIEF COMPLAINT:   I was asked to see Mr. Solitario Benjamin in consultation for iron deficiency anemia by Dr. Veronica Bianchi.    REASON FOR THE CONSULT:  Iron deficiency anemia    HPI:   Solitario is an 87 yo m with hx of prostate CA, COPD, HTN, and DM who presents with complaint of weakness, fatigue, SOB, and weight loss.    Says he has lost 30 lbs in past several months.  Notes SOB and fatigue.  Started bruising in the past several days spontaneously.  No pain.   Eating normally, no vomiting, no nausea.  BMs normal, brown. No bleeding.  Normal urine.  Stopped taking all of his meds about 6 weeks ago because no refills.    Found in ER to be anemic    REVIEW OF SYSTEMS: 13 point review of systems is negative except that noted above.    PAST MEDICAL HISTORY: COPD, prostate CA, DM    PAST SURGICAL HISTORY: prostate cancer and hernia repair    FAMILY HISTORY: no gi malignancy or ibd  Family History   Problem Relation Age of Onset     Sleep Apnea Child        SOCIAL HISTORY:   Social History     Tobacco Use     Smoking status: Former Smoker     Types: Cigarettes, Cigarettes     Quit date: 1983     Years since quittin.2     Smokeless tobacco: Former User   Substance Use Topics     Alcohol use: No        MEDS:  Medications Prior to Admission   Medication Sig Dispense Refill Last Dose     ibuprofen (ADVIL/MOTRIN) 200 MG tablet Take 200 mg by mouth daily   2022 at Unknown time     OMEGA-3/DHA/EPA/FISH OIL (FISH OIL-OMEGA-3 FATTY ACIDS) 300-1,000 mg capsule [OMEGA-3/DHA/EPA/FISH OIL (FISH OIL-OMEGA-3 FATTY ACIDS) 300-1,000 MG CAPSULE] Take 2 g by mouth daily.   2022 at Unknown time     Vitamin D, Cholecalciferol, 25 MCG (1000 UT) TABS Take 25 mcg by mouth daily    4/1/2022 at Unknown time     vitamin E 400 unit capsule [VITAMIN E 400 UNIT CAPSULE] Take 400 Units by mouth daily.   4/1/2022 at Unknown time     acetaminophen (TYLENOL) 325 MG tablet [ACETAMINOPHEN (TYLENOL) 325 MG TABLET] Take 2 tablets (650 mg total) by mouth every 4 (four) hours as needed.  0      albuterol (PROAIR HFA;PROVENTIL HFA;VENTOLIN HFA) 90 mcg/actuation inhaler [ALBUTEROL (PROAIR HFA;PROVENTIL HFA;VENTOLIN HFA) 90 MCG/ACTUATION INHALER] Inhale 2 puffs every 6 (six) hours as needed for wheezing or shortness of breath. 1 Inhaler 11      amLODIPine (NORVASC) 5 MG tablet [AMLODIPINE (NORVASC) 5 MG TABLET] Take 1 tablet (5 mg total) by mouth daily. 30 tablet 0      aspirin 81 MG EC tablet [ASPIRIN 81 MG EC TABLET] Take 81 mg by mouth at bedtime.        atorvastatin (LIPITOR) 20 MG tablet [ATORVASTATIN (LIPITOR) 20 MG TABLET] Take 20 mg by mouth at bedtime.        fluticasone (FLONASE ALLERGY RELIEF) 50 mcg/actuation nasal spray [FLUTICASONE (FLONASE ALLERGY RELIEF) 50 MCG/ACTUATION NASAL SPRAY] One spray in each nostril daily as needed 16 g 12      glucosamine sulfate 500 mg cap [GLUCOSAMINE SULFATE 500 MG CAP] Take 1,000 mg by mouth daily.        lisinopril (PRINIVIL,ZESTRIL) 20 MG tablet [LISINOPRIL (PRINIVIL,ZESTRIL) 20 MG TABLET] Take 20 mg by mouth daily.        loratadine 10 mg cap [LORATADINE 10 MG CAP] Take 10 mg by mouth daily. 30 each 11      metFORMIN (GLUCOPHAGE) 1000 MG tablet [METFORMIN (GLUCOPHAGE) 1000 MG TABLET] Take 1,000 mg by mouth 2 (two) times a day with meals.        multivitamin therapeutic tablet [MULTIVITAMIN THERAPEUTIC TABLET] Take 1 tablet by mouth daily.        omeprazole (PRILOSEC) 20 MG capsule [OMEPRAZOLE (PRILOSEC) 20 MG CAPSULE] Take 20 mg by mouth daily.        predniSONE (DELTASONE) 20 MG tablet [PREDNISONE (DELTASONE) 20 MG TABLET] Take 1 tablet (20 mg total) by mouth daily with breakfast. 4 tablet 0        ALLERGIES/SENSITIVITIES: Patient has no known  allergies.    MEDICATIONS:  No current outpatient medications on file.       PHYSICAL EXAM:  Temp:  [97.5  F (36.4  C)-98.4  F (36.9  C)] 98  F (36.7  C)  Pulse:  [] 94  Resp:  [18-26] 18  BP: (111-161)/(51-72) 146/67  SpO2:  [91 %-98 %] 94 %  Body mass index is 25.65 kg/m .  GEN: Well developed, well nourished 86 year old in no acute distress.  HEENT: sclera anicteric, moist mucous membranes.   LYMPH: No cervical lymphadenopathy  PULM: Nonlabored breathing. Breath sounds equal.   CARDIO: Regular rate  GI: Non-distended. Soft.  Non-tender to palpation.  No guarding. No rebound tenderness.  EXT: warm, no lower extremity edema  NEURO: Alert. No focal defects.   PSYCH: Mental status appropriate, mood and affect normal.    SKIN: Extensive bruising on left arm  MSK: No joint abnormalities    LABORATORY DATA:  CBC RESULTS:   Recent Labs   Lab Test 04/02/22  0606 04/02/22  0521   WBC  --  7.1   RBC  --  3.39*   HGB 7.0* 7.0*   HCT  --  25.1*   MCV  --  74*   MCH  --  20.6*   MCHC  --  27.9*   RDW  --  25.3*   PLT  --  224   HGB 4.6 at admission, rec'd 3 units of blood       CMP Results:   Recent Labs   Lab Test 04/02/22  1152 04/02/22  0803 04/02/22  0521   NA  --   --  138   POTASSIUM  --   --  3.7   CHLORIDE  --   --  107   CO2  --   --  19*   ANIONGAP  --   --  12   *   < > 103   BUN  --   --  20   CR  --   --  1.17   BILITOTAL  --   --  1.4*   ALKPHOS  --   --  69   ALT  --   --  16   AST  --   --  24    < > = values in this interval not displayed.    Direct bili 0.3    INR Results:   Recent Labs   Lab Test 04/01/22  1838   INR 1.24*      Retic 1.3%  B12 1100  TSH 6.98, ft4 0.82  Iron 15   (normal)  Haptoglobin 114 (normal)        RELEVANT IMAGING:      EXAM: CT CHEST PULMONARY EMBOLISM W CONTRAST  LOCATION: [facility]  DATE/TIME: 4/1/2022 8:23 PM     INDICATION: Shortness of breath and arm pain.  COMPARISON: 04/19/1918.  TECHNIQUE: CT chest pulmonary angiogram during  arterial phase injection of IV contrast. Multiplanar reformats and MIP reconstructions were performed. Dose reduction techniques were used.   CONTRAST: Isovue 370, 75 mL.     FINDINGS:  ANGIOGRAM CHEST: Pulmonary arteries are normal caliber and negative for pulmonary emboli. Thoracic aorta is negative for dissection. No CT evidence of right heart strain.     LUNGS AND PLEURA: At least moderate emphysema. Small bilateral pleural effusions and associated compressive atelectasis versus less likely infiltrate.     MEDIASTINUM/AXILLAE: No adenopathy or aneurysm. Moderate hiatal hernia.     CORONARY ARTERY CALCIFICATION: Severe.     UPPER ABDOMEN: No acute findings.     MUSCULOSKELETAL: No frankly destructive bony lesions.                                                                      IMPRESSION:  1.  No pulmonary embolism demonstrated.  2.  Small bilateral pleural effusions and associated probable compressive atelectasis versus less likely infiltrate.  3.  At least moderate emphysema.  4.  Moderate hiatal hernia.      EXAM: CT ABDOMEN PELVIS W CONTRAST  LOCATION: [facility]  DATE/TIME: 4/1/2022 8:23 PM     INDICATION: Anemia and dyspnea.  COMPARISON: 10/25/2013.  TECHNIQUE: CT scan of the abdomen and pelvis was performed following injection of IV contrast. Multiplanar reformats were obtained. Dose reduction techniques were used.  CONTRAST: Isovue 370, 75 mL.     FINDINGS:   LOWER CHEST: There is a mild to moderate-sized right pleural effusion and a mild left pleural effusion with some bibasilar atelectasis seen, but no central airway obstruction. There is a moderate sized esophageal hiatal hernia. Calcified granuloma left   lower lobe. Mild findings of centrilobular emphysema.     HEPATOBILIARY: Large gallbladder stone with the gallbladder otherwise normal in appearance with no gallbladder wall thickening or pericholecystic fluid identified. The liver and bile ducts are normal in  appearance.     PANCREAS: Normal.     SPLEEN: Normal.     ADRENAL GLANDS: Normal.     KIDNEYS/BLADDER: There are 2 benign cysts seen in the right kidney and no follow-up is needed. The kidneys, ureters, and bladder are otherwise normal.     BOWEL: There is some localized significant stool seen in the distal colon/rectum with mild findings of obstipation. The bowel is otherwise normal.     LYMPH NODES: Normal.     VASCULATURE: There is moderate to marked calcification seen, but there is no evidence for aneurysmal dilatation.     PELVIC ORGANS: The prostate gland is not seen and presumed to be surgically absent.     MUSCULOSKELETAL: Advanced degenerative changes most marked lower lumbar spine.                                                                      IMPRESSION:   1.  Obstipation.     2.  Bilateral pleural effusions greatest on the right with bibasilar atelectasis.     3.  Esophageal hiatal hernia.     4.  Cholelithiasis with the gallbladder otherwise normal in appearance.        EXAM: US UPPER EXTREMITY VENOUS DUPLEX LEFT  LOCATION: [facility]  DATE/TIME: 4/2/2022 6:18 AM     INDICATION: Bruising and swelling.  COMPARISON: None.  TECHNIQUE: Venous Duplex ultrasound of the left upper extremity with (when possible) and without compression, augmentation, and duplex. Color flow and spectral Doppler with waveform analysis performed.     FINDINGS: Ultrasound includes evaluation of the internal jugular vein, innominate vein, subclavian vein, axillary vein, and brachial vein. The superficial cephalic and basilic veins were also evaluated where seen.      LEFT: No deep venous thrombosis. No superficial thrombophlebitis. In the left axilla, there is a 6.1 x 4.9 x 3.2 cm heterogeneously hypoechoic focus                                                                      IMPRESSION:   1.  No deep venous thrombosis in the left upper extremity.     2.  Probable 6.1 cm left axillary  hematoma.      ASSESSMENT:     Solitario is an 85 yo m with hx of prostate CA, COPD, HTN, and DM who presents with complaint of weakness, fatigue, SOB, and weight loss, found to have large ecchymosis on arm and iron deficiency anemia.    Significant anemia at admission, microcytic.  No sign of GI blood loss.Could still be an occult GI blood loss, but a hemolytic process or other hematologic pathology seems more likely.  Indirect bilirubin up, though LDH/Haptoglobin equivocal.  Would expect a higher reticulocyte count.    PLAN:    1) Microcytic anemia  -Favor proceeding with hematology evaluation first as I favor hemolytic or other hematologic pathology to be driving anemia.  -Not opposed to EGD/Colonoscopy if hematology feels GI blood loss more likely. Discussed idea with Jae and he would be willing to do procedures.    Will follow  If GI evaluation desired by hematology, then would plan for procedures on Monday.    Thank you          Yosef Bailey MD  Thank you for the opportunity to participate in the care of this patient.   Please feel free to call me with any questions or concerns.  Phone number [phone].            CC: [facility] HospitalLes Timothy J

## 2022-04-03 ENCOUNTER — APPOINTMENT (OUTPATIENT)
Dept: PHYSICAL THERAPY | Facility: HOSPITAL | Age: 87
DRG: 368 | End: 2022-04-03
Attending: INTERNAL MEDICINE
Payer: COMMERCIAL

## 2022-04-03 LAB
ANION GAP SERPL CALCULATED.3IONS-SCNC: 13 MMOL/L (ref 5–18)
ATRIAL RATE - MUSE: 83 BPM
BUN SERPL-MCNC: 17 MG/DL (ref 8–28)
CALCIUM SERPL-MCNC: 8.7 MG/DL (ref 8.5–10.5)
CHLORIDE BLD-SCNC: 104 MMOL/L (ref 98–107)
CO2 SERPL-SCNC: 23 MMOL/L (ref 22–31)
CREAT SERPL-MCNC: 1.11 MG/DL (ref 0.7–1.3)
DIASTOLIC BLOOD PRESSURE - MUSE: NORMAL MMHG
ERYTHROCYTE [DISTWIDTH] IN BLOOD BY AUTOMATED COUNT: 25.7 % (ref 10–15)
GFR SERPL CREATININE-BSD FRML MDRD: 65 ML/MIN/1.73M2
GLUCOSE BLD-MCNC: 118 MG/DL (ref 70–125)
GLUCOSE BLDC GLUCOMTR-MCNC: 102 MG/DL (ref 70–99)
GLUCOSE BLDC GLUCOMTR-MCNC: 107 MG/DL (ref 70–99)
GLUCOSE BLDC GLUCOMTR-MCNC: 117 MG/DL (ref 70–99)
GLUCOSE BLDC GLUCOMTR-MCNC: 125 MG/DL (ref 70–99)
HCT VFR BLD AUTO: 27.4 % (ref 40–53)
HGB BLD-MCNC: 8.3 G/DL (ref 13.3–17.7)
HGB BLD-MCNC: 8.3 G/DL (ref 13.3–17.7)
HGB BLD-MCNC: 8.5 G/DL (ref 13.3–17.7)
INTERPRETATION ECG - MUSE: NORMAL
IRON SATN MFR SERPL: 5 % (ref 20–50)
IRON SERPL-MCNC: 18 UG/DL (ref 42–175)
IRON SERPL-MCNC: 18 UG/DL (ref 42–175)
MCH RBC QN AUTO: 21.6 PG (ref 26.5–33)
MCHC RBC AUTO-ENTMCNC: 30.3 G/DL (ref 31.5–36.5)
MCV RBC AUTO: 71 FL (ref 78–100)
P AXIS - MUSE: 40 DEGREES
PLATELET # BLD AUTO: 182 10E3/UL (ref 150–450)
POTASSIUM BLD-SCNC: 3.5 MMOL/L (ref 3.5–5)
PR INTERVAL - MUSE: 176 MS
QRS DURATION - MUSE: 122 MS
QT - MUSE: 410 MS
QTC - MUSE: 481 MS
R AXIS - MUSE: 1 DEGREES
RBC # BLD AUTO: 3.85 10E6/UL (ref 4.4–5.9)
SODIUM SERPL-SCNC: 140 MMOL/L (ref 136–145)
SYSTOLIC BLOOD PRESSURE - MUSE: NORMAL MMHG
T AXIS - MUSE: 54 DEGREES
TIBC SERPL-MCNC: 378 UG/DL (ref 313–563)
TRANSFERRIN SERPL-MCNC: 302 MG/DL (ref 212–360)
TROPONIN I SERPL-MCNC: 1.05 NG/ML (ref 0–0.29)
VENTRICULAR RATE- MUSE: 83 BPM
WBC # BLD AUTO: 6.7 10E3/UL (ref 4–11)

## 2022-04-03 PROCEDURE — 85027 COMPLETE CBC AUTOMATED: CPT | Performed by: HOSPITALIST

## 2022-04-03 PROCEDURE — C9113 INJ PANTOPRAZOLE SODIUM, VIA: HCPCS | Performed by: INTERNAL MEDICINE

## 2022-04-03 PROCEDURE — 36415 COLL VENOUS BLD VENIPUNCTURE: CPT | Performed by: INTERNAL MEDICINE

## 2022-04-03 PROCEDURE — 210N000001 HC R&B IMCU HEART CARE

## 2022-04-03 PROCEDURE — 97161 PT EVAL LOW COMPLEX 20 MIN: CPT | Mod: GP

## 2022-04-03 PROCEDURE — 36415 COLL VENOUS BLD VENIPUNCTURE: CPT | Performed by: HOSPITALIST

## 2022-04-03 PROCEDURE — 97530 THERAPEUTIC ACTIVITIES: CPT | Mod: GP

## 2022-04-03 PROCEDURE — 250N000013 HC RX MED GY IP 250 OP 250 PS 637: Performed by: INTERNAL MEDICINE

## 2022-04-03 PROCEDURE — 97116 GAIT TRAINING THERAPY: CPT | Mod: GP

## 2022-04-03 PROCEDURE — 84484 ASSAY OF TROPONIN QUANT: CPT | Performed by: HOSPITALIST

## 2022-04-03 PROCEDURE — 258N000003 HC RX IP 258 OP 636: Performed by: HOSPITALIST

## 2022-04-03 PROCEDURE — 85018 HEMOGLOBIN: CPT | Performed by: INTERNAL MEDICINE

## 2022-04-03 PROCEDURE — 250N000011 HC RX IP 250 OP 636: Performed by: HOSPITALIST

## 2022-04-03 PROCEDURE — 99232 SBSQ HOSP IP/OBS MODERATE 35: CPT | Performed by: INTERNAL MEDICINE

## 2022-04-03 PROCEDURE — 250N000011 HC RX IP 250 OP 636: Performed by: INTERNAL MEDICINE

## 2022-04-03 PROCEDURE — 80048 BASIC METABOLIC PNL TOTAL CA: CPT | Performed by: HOSPITALIST

## 2022-04-03 RX ORDER — MAGNESIUM CARB/ALUMINUM HYDROX 105-160MG
296 TABLET,CHEWABLE ORAL ONCE
Status: COMPLETED | OUTPATIENT
Start: 2022-04-03 | End: 2022-04-03

## 2022-04-03 RX ORDER — BISACODYL 5 MG
10 TABLET, DELAYED RELEASE (ENTERIC COATED) ORAL ONCE
Status: COMPLETED | OUTPATIENT
Start: 2022-04-03 | End: 2022-04-03

## 2022-04-03 RX ORDER — POLYETHYLENE GLYCOL 3350 17 G/17G
238 POWDER, FOR SOLUTION ORAL ONCE
Status: COMPLETED | OUTPATIENT
Start: 2022-04-03 | End: 2022-04-03

## 2022-04-03 RX ORDER — HYDRALAZINE HYDROCHLORIDE 25 MG/1
25 TABLET, FILM COATED ORAL EVERY 8 HOURS SCHEDULED
Status: DISCONTINUED | OUTPATIENT
Start: 2022-04-03 | End: 2022-04-06 | Stop reason: HOSPADM

## 2022-04-03 RX ORDER — FUROSEMIDE 20 MG
20 TABLET ORAL DAILY
Status: DISCONTINUED | OUTPATIENT
Start: 2022-04-04 | End: 2022-04-06 | Stop reason: HOSPADM

## 2022-04-03 RX ADMIN — MAGNESIUM CITRATE 296 ML: 1.75 LIQUID ORAL at 20:02

## 2022-04-03 RX ADMIN — POLYETHYLENE GLYCOL 3350 238 G: 17 POWDER, FOR SOLUTION ORAL at 17:57

## 2022-04-03 RX ADMIN — HYDRALAZINE HYDROCHLORIDE 25 MG: 25 TABLET, FILM COATED ORAL at 21:51

## 2022-04-03 RX ADMIN — BISACODYL 10 MG: 5 TABLET, COATED ORAL at 11:52

## 2022-04-03 RX ADMIN — METOPROLOL SUCCINATE 25 MG: 25 TABLET, EXTENDED RELEASE ORAL at 08:01

## 2022-04-03 RX ADMIN — HYDRALAZINE HYDROCHLORIDE 25 MG: 25 TABLET, FILM COATED ORAL at 13:59

## 2022-04-03 RX ADMIN — FUROSEMIDE 20 MG: 10 INJECTION, SOLUTION INTRAMUSCULAR; INTRAVENOUS at 01:50

## 2022-04-03 RX ADMIN — ASPIRIN 81 MG: 81 TABLET, COATED ORAL at 08:02

## 2022-04-03 RX ADMIN — PANTOPRAZOLE SODIUM 40 MG: 40 INJECTION, POWDER, FOR SOLUTION INTRAVENOUS at 20:01

## 2022-04-03 RX ADMIN — DOCUSATE SODIUM 50 MG AND SENNOSIDES 8.6 MG 1 TABLET: 8.6; 5 TABLET, FILM COATED ORAL at 20:01

## 2022-04-03 RX ADMIN — HYDRALAZINE HYDROCHLORIDE 12.5 MG: 25 TABLET, FILM COATED ORAL at 05:21

## 2022-04-03 RX ADMIN — LORATADINE 10 MG: 10 TABLET ORAL at 08:02

## 2022-04-03 RX ADMIN — IRON SUCROSE 200 MG: 20 INJECTION, SOLUTION INTRAVENOUS at 07:59

## 2022-04-03 ASSESSMENT — ACTIVITIES OF DAILY LIVING (ADL)
ADLS_ACUITY_SCORE: 12

## 2022-04-03 NOTE — PROGRESS NOTES
[facility]    PROGRESS NOTE - Hospitalist Service    Assessment and Plan    Principal Problem:    Acute on chronic systolic heart failure (H)  Active Problems:    Accelerated hypertension    SOB (shortness of breath)    Dyspnea on exertion    Symptomatic anemia    Ecchymosis of forearm    Elevated troponin level not due to acute coronary syndrome    Solitario Benjamin is a 86 year old old male with h/o copd, prostate cancer, htn, dm who has been off of all of his meds for at least 6 weeks and presents with SOB and 30lb weight loss and is found to have severe anemia (hb 4.6) and 6.1cm L shoulder hematoma.      #acute blood loss anemia due to left shoulder hematoma  #microcytic anemia/ISREAL  - transfused 3 units currently and stable, per notes transfuse for Hgb < 7 but possibly aim hirer with  elevated trops   - iron studies suggest ISREAL, scans no evidence of malignancy  - Heme consult appreciated and started IV iron and recommends GI work up for bleeding source   - [facility] consulted and plan for EGD and colonoscopy on Monday 4/4 in OR  - PPI   - HGB daily .      #demand NSTEMI (type II) - possibly due to severe anemia.   -  No pain or EKG changes.   - echo shows decreased EF 25-30% and elevated trops likely from demand ischemia not ACS per Cardiology  - angiogram possibly Tuesday pending findings on Scopes tomorrow   - ASA 81 mg daily  - low dose metoprolol and hydralazine, stopped amlodipine   - IV lasix      #Bilateral shoulder weakness - isolated to shoulders.  - ortho consulted per note no urgent intervention, WBAT and ok for PT. F/u as outpatient   - Ice  - PT/OT     #copd - no exacerbation. Not on maintenance meds  - monitor      #30 lb weight loss - no clear malignancy on CT CAP.  Should be up to date on c-scope as above.     #Falls - PT/OT.  Fall precautions.       #HTN - resume norvasc, lisinopril on hold for now post contrast     #Memory loss  -per daughter --months  - CT neg, atrophy and  microvascular ischemic changes   -check tsh 6.98   - b12 normal     -hx of prostate cancer many years ago imaging as above   -hx of DM - A1c 5.7.  Not on meds  -mildly elevated TSH - outpatient f/u      COVID-19 PCR Results    COVID-19 PCR Results 4/1/22   SARS CoV2 PCR Negative      Comments are available for some flowsheets but are not being displayed.         COVID-19 Antibody Results, Testing for Immunity    COVID-19 Antibody Results, Testing for Immunity   No data to display.            VTE prophylaxis:  Pneumatic Compression Devices  DIET: Orders Placed This Encounter      Combination Diet Regular Diet Adult    Drains/Lines: none  Weight bearing status: WBAT  Disposition/Barriers to discharge: pending further procedures and findings   Code Status: No CPR- Do NOT Intubate    Subjective:  denies CP or SOB no blood in stools     PHYSICAL EXAM  Temp:  [97.7  F (36.5  C)-98.8  F (37.1  C)] 98  F (36.7  C)  Pulse:  [] 83  Resp:  [18-24] 20  BP: (118-166)/(57-74) 166/74  SpO2:  [90 %-94 %] 90 %  Wt Readings from Last 1 Encounters:   04/03/22 86.1 kg (189 lb 12.8 oz)       Intake/Output Summary (Last 24 hours) at 4/3/2022 0937  Last data filed at 4/3/2022 0900  Gross per 24 hour   Intake 1625 ml   Output 6150 ml   Net -4525 ml      Body mass index is 25.04 kg/m .    Physical Exam  Constitutional:       Appearance: Normal appearance.   HENT:      Head: Normocephalic and atraumatic.   Cardiovascular:      Rate and Rhythm: Normal rate and regular rhythm.      Pulses: Normal pulses.      Heart sounds: Normal heart sounds.   Pulmonary:      Effort: Pulmonary effort is normal.      Breath sounds: Normal breath sounds.   Abdominal:      General: Bowel sounds are normal.      Palpations: Abdomen is soft.   Musculoskeletal:      Comments: Trace ankle edema, LUE ecchymosis  and swelling no change    Skin:     General: Skin is warm.      Capillary Refill: Capillary refill takes less than 2 seconds.      Comments:  ecchymosis as above   Neurological:      General: No focal deficit present.      Mental Status: He is alert and oriented to person, place, and time. Mental status is at baseline.       PERTINENT LABS/IMAGING:  Results for orders placed or performed during the hospital encounter of 04/01/22   US Upper Extremity Venous Duplex Left    Impression    IMPRESSION:   1.  No deep venous thrombosis in the left upper extremity.    2.  Probable 6.1 cm left axillary hematoma.   CT Chest Pulmonary Embolism w Contrast    Impression    IMPRESSION:  1.  No pulmonary embolism demonstrated.  2.  Small bilateral pleural effusions and associated probable compressive atelectasis versus less likely infiltrate.  3.  At least moderate emphysema.  4.  Moderate hiatal hernia.   CT Abdomen Pelvis w Contrast    Impression    IMPRESSION:   1.  Obstipation.    2.  Bilateral pleural effusions greatest on the right with bibasilar atelectasis.    3.  Esophageal hiatal hernia.    4.  Cholelithiasis with the gallbladder otherwise normal in appearance.   CT Head w/o Contrast    Impression    IMPRESSION:  1.  No CT evidence for acute intracranial process.  2.  Brain atrophy and presumed chronic microvascular ischemic changes as above.   XR Shoulder Left 2 Views    Impression    IMPRESSION: Normal joint spaces and alignment. No fracture.    Echocardiogram Complete   Result Value Ref Range    LVEF  25-30% (severely reduced)        Recent Labs   Lab 04/03/22  1208 04/03/22  0751 04/03/22  0543 04/02/22  2057 04/02/22  1740 04/02/22  1642 04/02/22  1227 04/02/22  0606 04/02/22  0521 04/01/22  2121 04/01/22  1838   WBC  --   --  6.7  --   --   --   --   --  7.1  --  8.1   HGB  --   --  8.3*  8.3*  --  7.5*  --  7.8*   < > 7.0*  --  4.6*   MCV  --   --  71*  --   --   --   --   --  74*  --  63*   PLT  --   --  182  --   --   --   --   --  224  --  258   INR  --   --   --   --   --   --   --   --   --   --  1.24*   NA  --   --  140  --   --   --   --   --   138  --  136   POTASSIUM  --   --  3.5  --   --   --   --   --  3.7  --  3.9   CHLORIDE  --   --  104  --   --   --   --   --  107  --  106   CO2  --   --  23  --   --   --   --   --  19*  --  18*   BUN  --   --  17  --   --   --   --   --  20  --  21   CR  --   --  1.11  --   --   --   --   --  1.17  --  1.22   ANIONGAP  --   --  13  --   --   --   --   --  12  --  12   GUILLE  --   --  8.7  --   --   --   --   --  8.8  --  8.9   * 125* 118   < >  --    < >  --    < > 103   < > 135*   ALBUMIN  --   --   --   --   --   --   --   --  3.3*  --  3.4*   PROTTOTAL  --   --   --   --   --   --   --   --  5.9*  --  5.9*   BILITOTAL  --   --   --   --   --   --   --   --  1.4*  --  1.0   ALKPHOS  --   --   --   --   --   --   --   --  69  --  62   ALT  --   --   --   --   --   --   --   --  16  --  13   AST  --   --   --   --   --   --   --   --  24  --  25    < > = values in this interval not displayed.     Recent Labs   Lab Test 03/17/21  1049   CHOL 112   HDL 45   LDL 47   TRIG 101     Recent Labs   Lab Test 03/17/21  1049 03/16/20  1022 10/29/18  1200   LDL 47 63 79     Recent Labs   Lab Test 04/03/22  0751 04/03/22  0543   NA  --  140   POTASSIUM  --  3.5   CHLORIDE  --  104   CO2  --  23   * 118   BUN  --  17   CR  --  1.11   GFRESTIMATED  --  65   GUILLE  --  8.7     Recent Labs   Lab Test 04/01/22  1838 01/22/18  0624   A1C 5.7* 7.4*     Recent Labs   Lab Test 04/03/22  0543 04/02/22  1740 04/02/22  1227   HGB 8.3*  8.3* 7.5* 7.8*     Recent Labs   Lab Test 04/03/22  0543 04/02/22  1227 04/02/22  0659   TROPONINI 1.05* 0.95* 0.69*     Recent Labs   Lab Test 04/01/22  1838 01/21/18  1615   * 20     Recent Labs   Lab Test 04/01/22  1838   TSH 6.98*     Recent Labs   Lab Test 04/01/22  1838   INR 1.24*       Jessenia Estevez MD  [facility] Medicine Service  [phone]

## 2022-04-03 NOTE — PLAN OF CARE
Problem: Anemia  Goal: Anemia Symptom Improvement  Outcome: Ongoing, Progressing  Intervention: Monitor and Manage Anemia  Recent Flowsheet Documentation  Taken 4/3/2022 0000 by Juan Diego Gross, RN  Safety Promotion/Fall Prevention:    bed alarm on    clutter free environment maintained    fall prevention program maintained    increased rounding and observation    increase visualization of patient    mobility aid in reach    lighting adjusted    nonskid shoes/slippers when out of bed    patient and family education    room door open    room organization consistent    safety round/check completed  Taken 4/2/2022 1930 by Juan Diego Gross, RN  Safety Promotion/Fall Prevention:    bed alarm on    clutter free environment maintained    fall prevention program maintained    increased rounding and observation    increase visualization of patient    mobility aid in reach    lighting adjusted    nonskid shoes/slippers when out of bed    patient and family education    room door open    room organization consistent    safety round/check completed   Goal Outcome Evaluation:  Denied pain overnight. No signs of bleeding. Voiding well. 5 pounds weight loss since yesterday.

## 2022-04-03 NOTE — PROGRESS NOTES
"   04/03/22 1400   Quick Adds   Type of Visit Initial PT Evaluation   Living Environment   People in Home alone   Current Living Arrangements [facility]  ([facility])   Home Accessibility no concerns   Transportation Anticipated car, drives self;family or friend will provide   Self-Care   Usual Activity Tolerance good   Current Activity Tolerance fair   Equipment Currently Used at Home walker, rolling;grab bar, toilet;grab bar, tub/shower   Fall history within last six months yes   Number of times patient has fallen within last six months 1   General Information   Onset of Illness/Injury or Date of Surgery 04/01/22   Referring Physician Jessenia Estevez MD   Patient/Family Therapy Goals Statement (PT) Return home   Pertinent History of Current Problem (include personal factors and/or comorbidities that impact the POC) Per pt's chart: \"86 year old old male with h/o copd, prostate cancer, htn, dm who has been off of all of his meds for at least 6 weeks and presents with SOB and 30lb weight loss and is found to have severe anemia (hb 4.6) and 6.1cm L shoulder hematoma.\"   Existing Precautions/Restrictions fall   Cognition   Affect/Mental Status (Cognition) WFL   Orientation Status (Cognition) oriented x 3   Pain Assessment   Patient Currently in Pain No   Posture    Posture Forward head position;Protracted shoulders   Strength (Manual Muscle Testing)   Strength (Manual Muscle Testing) Deficits observed during functional mobility   Bed Mobility   Comment, (Bed Mobility) SBA bed mobility supine<>sitting EOB, scooting toward HOB   Transfers   Comment, (Transfers) CGA sit<>stand at EOB   Gait/Stairs (Locomotion)   Distance in Feet (Required for LE Total Joints) 50 feet   Comment, (Gait/Stairs) CGA ambulation using FWW with 3 standing rest breaks   Balance   Balance Comments Requires used of FWW in standing, CGA   Clinical Impression   Criteria for Skilled Therapeutic Intervention Yes, treatment indicated   PT Diagnosis " (PT) Impaired functional mobility   Influenced by the following impairments Current medical condition, weakness, endurance, balance   Functional limitations due to impairments bed mobility, transfers, ambulation, community participation   Clinical Presentation (PT Evaluation Complexity) Stable/Uncomplicated   Clinical Presentation Rationale Pt presents as medically diagnosed   Clinical Decision Making (Complexity) low complexity   Planned Therapy Interventions (PT) balance training;bed mobility training;gait training;home exercise program;strengthening;transfer training   Anticipated Equipment Needs at Discharge (PT) walker, rolling   Risk & Benefits of therapy have been explained evaluation/treatment results reviewed;care plan/treatment goals reviewed;risks/benefits reviewed;patient   PT Discharge Planning   PT Discharge Recommendation (DC Rec) Transitional Care Facility;home with assist;home with home care physical therapy   PT Rationale for DC Rec Pt currently requires CGA with transfers and ambulation while using FWW. He would benefit from additional skilled care to improve independence with safe mobility. Pt lives alone and if discharging home, would require 24 hr supervision to ensure safety and reduced risk for falls.   PT Brief overview of current status CGA transfers and ambulation using FWW   Total Evaluation Time   Total Evaluation Time (Minutes) 10   Physical Therapy Goals   PT Frequency Daily   PT Predicated Duration/Target Date for Goal Attainment 04/10/22   PT Goals Bed Mobility;Transfers;Gait   PT: Bed Mobility Modified independent;Supine to/from sit   PT: Transfers Modified independent;Sit to/from stand;Assistive device   PT: Gait Modified independent;Assistive device;100 feet

## 2022-04-03 NOTE — PROGRESS NOTES
[facility] DIGESTIVE HEALTH PROGRESS NOTE      Subjective:              Feels well, no events overnight, no bleeding     Objective:                  Vital signs in last 24 hrs;  Temp:  [97.7  F (36.5  C)-98.8  F (37.1  C)] 98  F (36.7  C)  Pulse:  [65-97] 83  Resp:  [18-24] 20  BP: (118-166)/(57-74) 166/74  SpO2:  [90 %-94 %] 90 %    Physical Exam:   General: Comfortable appearing. Awake.   Cardiovascular: Regular rate. No edema  Chest: Non-labored breathing. Symmetric chest rise.   Abdomen: soft, non-tender, non-distended  Neurologic: Alert, no focal defects.     Current Labs:  CBC RESULTS: Recent Labs   Lab Test 04/03/22  0543   WBC 6.7   RBC 3.85*   HGB 8.3*  8.3*   HCT 27.4*   MCV 71*   MCH 21.6*   MCHC 30.3*   RDW 25.7*           CMP Results:   Recent Labs   Lab Test 04/03/22  0751 04/03/22  0543 04/02/22  0803 04/02/22  0521   NA  --  140  --  138   POTASSIUM  --  3.5  --  3.7   CHLORIDE  --  104  --  107   CO2  --  23  --  19*   ANIONGAP  --  13  --  12   * 118   < > 103   BUN  --  17  --  20   CR  --  1.11  --  1.17   BILITOTAL  --   --   --  1.4*   ALKPHOS  --   --   --  69   ALT  --   --   --  16   AST  --   --   --  24    < > = values in this interval not displayed.        INR Results:   Recent Labs   Lab Test 04/01/22  1838   INR 1.24*        RELEVANT IMAGING:       EXAM: CT CHEST PULMONARY EMBOLISM W CONTRAST  LOCATION: [facility]  DATE/TIME: 4/1/2022 8:23 PM     INDICATION: Shortness of breath and arm pain.  COMPARISON: 04/19/1918.  TECHNIQUE: CT chest pulmonary angiogram during arterial phase injection of IV contrast. Multiplanar reformats and MIP reconstructions were performed. Dose reduction techniques were used.   CONTRAST: Isovue 370, 75 mL.     FINDINGS:  ANGIOGRAM CHEST: Pulmonary arteries are normal caliber and negative for pulmonary emboli. Thoracic aorta is negative for dissection. No CT evidence of right heart strain.     LUNGS AND PLEURA: At least moderate  emphysema. Small bilateral pleural effusions and associated compressive atelectasis versus less likely infiltrate.     MEDIASTINUM/AXILLAE: No adenopathy or aneurysm. Moderate hiatal hernia.     CORONARY ARTERY CALCIFICATION: Severe.     UPPER ABDOMEN: No acute findings.     MUSCULOSKELETAL: No frankly destructive bony lesions.                                                                      IMPRESSION:  1.  No pulmonary embolism demonstrated.  2.  Small bilateral pleural effusions and associated probable compressive atelectasis versus less likely infiltrate.  3.  At least moderate emphysema.  4.  Moderate hiatal hernia.        EXAM: CT ABDOMEN PELVIS W CONTRAST  LOCATION: [facility]  DATE/TIME: 4/1/2022 8:23 PM     INDICATION: Anemia and dyspnea.  COMPARISON: 10/25/2013.  TECHNIQUE: CT scan of the abdomen and pelvis was performed following injection of IV contrast. Multiplanar reformats were obtained. Dose reduction techniques were used.  CONTRAST: Isovue 370, 75 mL.     FINDINGS:   LOWER CHEST: There is a mild to moderate-sized right pleural effusion and a mild left pleural effusion with some bibasilar atelectasis seen, but no central airway obstruction. There is a moderate sized esophageal hiatal hernia. Calcified granuloma left   lower lobe. Mild findings of centrilobular emphysema.     HEPATOBILIARY: Large gallbladder stone with the gallbladder otherwise normal in appearance with no gallbladder wall thickening or pericholecystic fluid identified. The liver and bile ducts are normal in appearance.     PANCREAS: Normal.     SPLEEN: Normal.     ADRENAL GLANDS: Normal.     KIDNEYS/BLADDER: There are 2 benign cysts seen in the right kidney and no follow-up is needed. The kidneys, ureters, and bladder are otherwise normal.     BOWEL: There is some localized significant stool seen in the distal colon/rectum with mild findings of obstipation. The bowel is otherwise normal.     LYMPH NODES:  Normal.     VASCULATURE: There is moderate to marked calcification seen, but there is no evidence for aneurysmal dilatation.     PELVIC ORGANS: The prostate gland is not seen and presumed to be surgically absent.     MUSCULOSKELETAL: Advanced degenerative changes most marked lower lumbar spine.                                                                      IMPRESSION:   1.  Obstipation.     2.  Bilateral pleural effusions greatest on the right with bibasilar atelectasis.     3.  Esophageal hiatal hernia.     4.  Cholelithiasis with the gallbladder otherwise normal in appearance.           EXAM: US UPPER EXTREMITY VENOUS DUPLEX LEFT  LOCATION: [facility]  DATE/TIME: 4/2/2022 6:18 AM     INDICATION: Bruising and swelling.  COMPARISON: None.  TECHNIQUE: Venous Duplex ultrasound of the left upper extremity with (when possible) and without compression, augmentation, and duplex. Color flow and spectral Doppler with waveform analysis performed.     FINDINGS: Ultrasound includes evaluation of the internal jugular vein, innominate vein, subclavian vein, axillary vein, and brachial vein. The superficial cephalic and basilic veins were also evaluated where seen.      LEFT: No deep venous thrombosis. No superficial thrombophlebitis. In the left axilla, there is a 6.1 x 4.9 x 3.2 cm heterogeneously hypoechoic focus                                                                      IMPRESSION:   1.  No deep venous thrombosis in the left upper extremity.     2.  Probable 6.1 cm left axillary hematoma.        ASSESSMENT:      Solitario is an 87 yo m with hx of prostate CA, COPD, HTN, and DM who presents with complaint of weakness, fatigue, SOB, and weight loss, found to have large ecchymosis on arm and iron deficiency anemia.     Significant anemia at admission, microcytic.  No sign of GI blood loss.Could still be an occult GI blood loss, but a hemolytic process or other hematologic pathology seems  possible, but not favored by hematology.  Hematology c/s 4/2 recommends proceeding with GI evaluation for ISREAL  Indirect bilirubin up, though LDH/Haptoglobin equivocal.  Would expect a higher reticulocyte count.     PLAN:     1) Microcytic anemia  -Appreciate hematology recs  -Will proceed with EGD/Colonoscopy Monday in OR, prep today, NPO midnight.  -HGB daily  -Pantoprazole daily for now ok.            Yosef Bailey MD  Thank you for the opportunity to participate in the care of this patient.   Please feel free to call me with any questions or concerns.  Phone number [phone].

## 2022-04-03 NOTE — PROGRESS NOTES
"CLINICAL NUTRITION SERVICES - REASSESSMENT NOTE     Nutrition Prescription    RECOMMENDATIONS FOR MDs/PROVIDERS TO ORDER:  None at this time    Malnutrition Status:    Moderate    Recommendations already ordered by Registered Dietitian (RD):  Changed diet from regular to 3 gm (pt ordering full meals with salt packets)    Future/Additional Recommendations:  Will monitor po     EVALUATION OF THE PROGRESS TOWARD GOALS   Diet: Clear Liquid, now  - NPO after midnight  Pt has been a Regular diet   Intake: 100% lunch and dinner yesterday and 100% for breakfast- full meal including salt packet - RD changed to 3 gm just prior to Clears being ordered.    Pt reports he has not been trying to lose weight.  He feels like his intake has been normal. (risk screen indicates pt eating poorly prior to admit)    NEW FINDINGS   Pt prepping for EGD, colonoscopy tomorrow morning    ANTHROPOMETRICS  Height: 185.4 cm (6' 1\")  Admit weight:  193  lb  Most Recent Weight: 86.1 kg (189 lb 12.8 oz)   Pt with >2% loss in one week and >10% loss in 6 months     Pt is currently on lasix  IBW: 83.6 kg  BMI: Overweight BMI 25-29.9  Weight hx:  21 201 lb  20 219 lb  3/16/21 228 lb  3/11/19 240 lb    GI   Per GI pt has bilateral pleural effusion, moderate emphysema, moderate hiatal hernia, large gall stone, significant stool in colon/rectum    LABS  Reviewed  Hgb 8.3  FSB  4/1 Hgb A1c 5.7      MEDICATIONS  Reviewed  Bowel prep  Lasix  Novolog  Venofer  Pantoprazole  Senna-docusate    MALNUTRITION:  % Weight Loss:  > 2% in 1 week (severe malnutrition) and >10% in 6 months  % Intake:  Decreased intake does not meet criteria for malnutrition   Subcutaneous Fat Loss:  Orbital region mild depletion  Muscle Loss:  Temporal region mild depletion  Fluid Retention: +1 left and right legs    Malnutrition Diagnosis: Moderate malnutrition  In Context of:  Acute illness or injury    Previous Goals   Patient to consume % of nutritionally " adequate meals three times per day, or the equivalent with supplements/snacks.  Evaluation: Met - until diet changed for colonoscopy prep    Previous Nutrition Diagnosis  Unintended weight loss related to acute on chronic illness as evidenced by 10.9% weight loss in six months, 3.4% in 7 days  Evaluation: No change    CURRENT NUTRITION DIAGNOSIS  Malnutrition related to acute on chronic illness as evidenced by >2% weight loss in one week, mild muscle and fat losses, and fluid accumulation      INTERVENTIONS  Implementation  None at this time    Goals  Diet advancement    Monitoring/Evaluation  Progress toward goals will be monitored and evaluated per protocol.

## 2022-04-03 NOTE — PROGRESS NOTES
[facility]  Cardiology      Progress Note: Aayush Sen MD    Primary Care: Yogi Carvajal    Assessment:         Acute on chronic systolic heart failure: The patient responded quite well to diuresis and institution of beta-blocker and vasodilator therapy.  By exam he is significantly improved and he tolerated the changes to his medical therapy well.  Long-term will need to continue to uptitrate his goal-directed therapy but for now I will increase his hydralazine today.    Troponin elevation not due to acute coronary syndrome: The patient has had low-level rise in his troponin level and given the findings on his echocardiogram warrants additional evaluation.  I have recommended coronary angiography, but given his significant anemia GI work-up which is planned for tomorrow will be done first which will clarify whether or not he can be safely started on anticoagulant therapy if he requires intervention.    Primary hypertension: Blood pressure is improving on his new medication regimen.    Anemia: Hematology and GI are coordinating his work-up and therapy.    Principal Problem:    Acute on chronic systolic heart failure (H)  Active Problems:    Accelerated hypertension    SOB (shortness of breath)    Dyspnea on exertion    Symptomatic anemia    Ecchymosis of forearm    Elevated troponin level not due to acute coronary syndrome     LOS: 2 days       Recommendations:    Increase hydralazine therapy.    Okay to proceed with upper and lower endoscopy.    Timing for coronary geography depending on the outcome of the above tests.      Time spent: 30 minutes spent on the date of the encounter doing chart review, history and exam, documentation and further activities as noted above.    Subjective:  Solitario Benjamin (86 year old male) is seen in follow-up for his newly diagnosed heart failure and troponin elevation.  The patient reports that overall he feels better with less shortness of breath today.   He offers no complaints regarding his current medical therapy.  He agrees to and is prepared to move forward with his upper and lower endoscopy.  He also agrees to move forward with coronary angiography once the endoscopy is completed.    No current outpatient medications on file.       Objective:   Vital signs in last 24 hours:  Temp:  [97.7  F (36.5  C)-98.8  F (37.1  C)] 97.7  F (36.5  C)  Pulse:  [65-97] 79  Resp:  [18-24] 20  BP: (118-166)/(57-74) 135/63  SpO2:  [90 %-94 %] 90 %  Weight:   [unfilled]   Weight change: -1.633 kg (-3 lb 9.6 oz)      Physical Exam:  General: The patient is alert oriented to person place and situation.  The patient is in no acute distress at the time of my evaluation.  Eyes: Pupils are equal, round, and reactive to light.  Conjunctiva and sclera are clear.  ENT: Oral mucosa is moist and without redness. No evident nasal discharge.  Pulmonary: Lungs are notable for improved breath sounds bilaterally with scattered rhonchi.      Cardiovascular exam: Rhythm is regular. S1 and S2 are normal. No apparent michelle. No significant murmur is present. JVP is mildly elevated today.  Lower extremities demonstrate trace edema at the ankles bilaterally.  Distal pulses are intact bilaterally.  Abdomen is soft, nontender, with no organomegaly. Bowel sounds are present.  Musculoskeletal: Spine is straight. Extremities without deformity.  Neuro: Gait is not observed as the patient is resting in a chair.     Skin is warm, dry, and otherwise intact.        Cardiographics:   Cardiac telemetry, personally reviewed demonstrates normal sinus rhythm.  No significant atrial or ventricular arrhythmia.  No significant pauses.    Radiology:      Lab Results:   Lab Results   Compnt Value Date     04/03/2022    CO2 23 04/03/2022    BUN 17 04/03/2022     Lab Results   Component Value Date    WBC 6.7 04/03/2022    HGB 8.3 04/03/2022    HGB 8.3 04/03/2022    HCT 27.4 04/03/2022    MCV 71 04/03/2022      04/03/2022     Lab Results   Component Value Date    INR 1.24 04/01/2022       Outside record review:

## 2022-04-03 NOTE — PLAN OF CARE
Problem: Anemia  Goal: Anemia Symptom Improvement  Outcome: Ongoing, Progressing  Intervention: Monitor and Manage Anemia  Recent Flowsheet Documentation  Taken 4/3/2022 0800 by Carmen Matthews RN  Safety Promotion/Fall Prevention:    activity supervised    assistive device/personal items within reach    bed alarm on   No overt signs of bleeding. Pt and family verbalized understanding of plan for EGD/ colonoscopy tomorrow. Prep started.  Ambulating well to BR and in hallway x1 assit with walker.

## 2022-04-04 ENCOUNTER — APPOINTMENT (OUTPATIENT)
Dept: OCCUPATIONAL THERAPY | Facility: HOSPITAL | Age: 87
DRG: 368 | End: 2022-04-04
Attending: INTERNAL MEDICINE
Payer: COMMERCIAL

## 2022-04-04 ENCOUNTER — ANESTHESIA (OUTPATIENT)
Dept: SURGERY | Facility: HOSPITAL | Age: 87
DRG: 368 | End: 2022-04-04
Payer: COMMERCIAL

## 2022-04-04 ENCOUNTER — ANESTHESIA EVENT (OUTPATIENT)
Dept: SURGERY | Facility: HOSPITAL | Age: 87
DRG: 368 | End: 2022-04-04
Payer: COMMERCIAL

## 2022-04-04 PROBLEM — R00.0 TACHYCARDIA: Status: RESOLVED | Noted: 2018-01-21 | Resolved: 2022-04-04

## 2022-04-04 PROBLEM — N17.9 AKI (ACUTE KIDNEY INJURY) (H): Status: RESOLVED | Noted: 2018-01-21 | Resolved: 2022-04-04

## 2022-04-04 PROBLEM — D62 ACUTE BLOOD LOSS ANEMIA: Status: ACTIVE | Noted: 2022-04-04

## 2022-04-04 PROBLEM — D50.9 IRON DEFICIENCY ANEMIA: Status: ACTIVE | Noted: 2022-04-04

## 2022-04-04 PROBLEM — E11.9 TYPE 2 DIABETES MELLITUS WITHOUT COMPLICATION, WITHOUT LONG-TERM CURRENT USE OF INSULIN (H): Status: ACTIVE | Noted: 2018-01-21

## 2022-04-04 PROBLEM — R06.02 SOB (SHORTNESS OF BREATH): Status: RESOLVED | Noted: 2022-04-01 | Resolved: 2022-04-04

## 2022-04-04 PROBLEM — C61 PROSTATE CANCER (H): Status: ACTIVE | Noted: 2022-04-04

## 2022-04-04 PROBLEM — J98.8 RESPIRATORY INFECTION: Status: RESOLVED | Noted: 2018-01-21 | Resolved: 2022-04-04

## 2022-04-04 LAB
ANION GAP SERPL CALCULATED.3IONS-SCNC: 8 MMOL/L (ref 5–18)
ATRIAL RATE - MUSE: 91 BPM
BUN SERPL-MCNC: 10 MG/DL (ref 8–28)
CALCIUM SERPL-MCNC: 8.9 MG/DL (ref 8.5–10.5)
CHLORIDE BLD-SCNC: 104 MMOL/L (ref 98–107)
CO2 SERPL-SCNC: 25 MMOL/L (ref 22–31)
COLONOSCOPY: NORMAL
CREAT SERPL-MCNC: 1 MG/DL (ref 0.7–1.3)
DIASTOLIC BLOOD PRESSURE - MUSE: NORMAL MMHG
ERYTHROCYTE [DISTWIDTH] IN BLOOD BY AUTOMATED COUNT: 26.5 % (ref 10–15)
GFR SERPL CREATININE-BSD FRML MDRD: 74 ML/MIN/1.73M2
GLUCOSE BLD-MCNC: 103 MG/DL (ref 70–125)
GLUCOSE BLDC GLUCOMTR-MCNC: 113 MG/DL (ref 70–99)
GLUCOSE BLDC GLUCOMTR-MCNC: 113 MG/DL (ref 70–99)
GLUCOSE BLDC GLUCOMTR-MCNC: 116 MG/DL (ref 70–99)
GLUCOSE BLDC GLUCOMTR-MCNC: 121 MG/DL (ref 70–99)
GLUCOSE BLDC GLUCOMTR-MCNC: 170 MG/DL (ref 70–99)
HCT VFR BLD AUTO: 29.5 % (ref 40–53)
HGB BLD-MCNC: 9 G/DL (ref 13.3–17.7)
HGB BLD-MCNC: 9 G/DL (ref 13.3–17.7)
INTERPRETATION ECG - MUSE: NORMAL
MCH RBC QN AUTO: 22 PG (ref 26.5–33)
MCHC RBC AUTO-ENTMCNC: 30.5 G/DL (ref 31.5–36.5)
MCV RBC AUTO: 72 FL (ref 78–100)
P AXIS - MUSE: 27 DEGREES
PATH REPORT.COMMENTS IMP SPEC: NORMAL
PATH REPORT.FINAL DX SPEC: NORMAL
PATH REPORT.MICROSCOPIC SPEC OTHER STN: NORMAL
PATH REPORT.RELEVANT HX SPEC: NORMAL
PLATELET # BLD AUTO: 204 10E3/UL (ref 150–450)
POTASSIUM BLD-SCNC: 3.3 MMOL/L (ref 3.5–5)
PR INTERVAL - MUSE: 168 MS
QRS DURATION - MUSE: 118 MS
QT - MUSE: 376 MS
QTC - MUSE: 462 MS
R AXIS - MUSE: -5 DEGREES
RBC # BLD AUTO: 4.1 10E6/UL (ref 4.4–5.9)
SODIUM SERPL-SCNC: 137 MMOL/L (ref 136–145)
SYSTOLIC BLOOD PRESSURE - MUSE: NORMAL MMHG
T AXIS - MUSE: -18 DEGREES
UPPER GI ENDOSCOPY: NORMAL
VENTRICULAR RATE- MUSE: 91 BPM
WBC # BLD AUTO: 7.4 10E3/UL (ref 4–11)

## 2022-04-04 PROCEDURE — 258N000003 HC RX IP 258 OP 636: Performed by: ANESTHESIOLOGY

## 2022-04-04 PROCEDURE — 80048 BASIC METABOLIC PNL TOTAL CA: CPT | Performed by: HOSPITALIST

## 2022-04-04 PROCEDURE — 250N000013 HC RX MED GY IP 250 OP 250 PS 637: Performed by: INTERNAL MEDICINE

## 2022-04-04 PROCEDURE — 0DJD8ZZ INSPECTION OF LOWER INTESTINAL TRACT, VIA NATURAL OR ARTIFICIAL OPENING ENDOSCOPIC: ICD-10-PCS | Performed by: INTERNAL MEDICINE

## 2022-04-04 PROCEDURE — 250N000011 HC RX IP 250 OP 636

## 2022-04-04 PROCEDURE — 250N000009 HC RX 250

## 2022-04-04 PROCEDURE — 250N000011 HC RX IP 250 OP 636: Performed by: HOSPITALIST

## 2022-04-04 PROCEDURE — 250N000013 HC RX MED GY IP 250 OP 250 PS 637: Performed by: GENERAL ACUTE CARE HOSPITAL

## 2022-04-04 PROCEDURE — 0DB58ZX EXCISION OF ESOPHAGUS, VIA NATURAL OR ARTIFICIAL OPENING ENDOSCOPIC, DIAGNOSTIC: ICD-10-PCS | Performed by: INTERNAL MEDICINE

## 2022-04-04 PROCEDURE — 88342 IMHCHEM/IMCYTCHM 1ST ANTB: CPT | Mod: 26 | Performed by: PATHOLOGY

## 2022-04-04 PROCEDURE — 210N000001 HC R&B IMCU HEART CARE

## 2022-04-04 PROCEDURE — 83516 IMMUNOASSAY NONANTIBODY: CPT | Performed by: INTERNAL MEDICINE

## 2022-04-04 PROCEDURE — 99232 SBSQ HOSP IP/OBS MODERATE 35: CPT | Performed by: INTERNAL MEDICINE

## 2022-04-04 PROCEDURE — 85018 HEMOGLOBIN: CPT | Performed by: HOSPITALIST

## 2022-04-04 PROCEDURE — 99233 SBSQ HOSP IP/OBS HIGH 50: CPT | Performed by: GENERAL ACUTE CARE HOSPITAL

## 2022-04-04 PROCEDURE — 258N000003 HC RX IP 258 OP 636

## 2022-04-04 PROCEDURE — 0DB98ZX EXCISION OF DUODENUM, VIA NATURAL OR ARTIFICIAL OPENING ENDOSCOPIC, DIAGNOSTIC: ICD-10-PCS | Performed by: INTERNAL MEDICINE

## 2022-04-04 PROCEDURE — 250N000012 HC RX MED GY IP 250 OP 636 PS 637: Performed by: INTERNAL MEDICINE

## 2022-04-04 PROCEDURE — 0DB68ZX EXCISION OF STOMACH, VIA NATURAL OR ARTIFICIAL OPENING ENDOSCOPIC, DIAGNOSTIC: ICD-10-PCS | Performed by: INTERNAL MEDICINE

## 2022-04-04 PROCEDURE — 370N000017 HC ANESTHESIA TECHNICAL FEE, PER MIN: Performed by: INTERNAL MEDICINE

## 2022-04-04 PROCEDURE — 36415 COLL VENOUS BLD VENIPUNCTURE: CPT | Performed by: HOSPITALIST

## 2022-04-04 PROCEDURE — 272N000001 HC OR GENERAL SUPPLY STERILE: Performed by: INTERNAL MEDICINE

## 2022-04-04 PROCEDURE — 97535 SELF CARE MNGMENT TRAINING: CPT | Mod: GO

## 2022-04-04 PROCEDURE — 88305 TISSUE EXAM BY PATHOLOGIST: CPT | Mod: TC | Performed by: INTERNAL MEDICINE

## 2022-04-04 PROCEDURE — 360N000075 HC SURGERY LEVEL 2, PER MIN: Performed by: INTERNAL MEDICINE

## 2022-04-04 PROCEDURE — 82784 ASSAY IGA/IGD/IGG/IGM EACH: CPT | Performed by: INTERNAL MEDICINE

## 2022-04-04 PROCEDURE — 97165 OT EVAL LOW COMPLEX 30 MIN: CPT | Mod: GO

## 2022-04-04 PROCEDURE — 999N000141 HC STATISTIC PRE-PROCEDURE NURSING ASSESSMENT: Performed by: INTERNAL MEDICINE

## 2022-04-04 PROCEDURE — 258N000003 HC RX IP 258 OP 636: Performed by: HOSPITALIST

## 2022-04-04 PROCEDURE — 88305 TISSUE EXAM BY PATHOLOGIST: CPT | Mod: 26 | Performed by: PATHOLOGY

## 2022-04-04 RX ORDER — LIDOCAINE 40 MG/G
CREAM TOPICAL
Status: DISCONTINUED | OUTPATIENT
Start: 2022-04-04 | End: 2022-04-04 | Stop reason: HOSPADM

## 2022-04-04 RX ORDER — SODIUM CHLORIDE, SODIUM LACTATE, POTASSIUM CHLORIDE, CALCIUM CHLORIDE 600; 310; 30; 20 MG/100ML; MG/100ML; MG/100ML; MG/100ML
INJECTION, SOLUTION INTRAVENOUS CONTINUOUS
Status: CANCELLED | OUTPATIENT
Start: 2022-04-04

## 2022-04-04 RX ORDER — HYDROMORPHONE HYDROCHLORIDE 1 MG/ML
0.2 INJECTION, SOLUTION INTRAMUSCULAR; INTRAVENOUS; SUBCUTANEOUS EVERY 5 MIN PRN
Status: CANCELLED | OUTPATIENT
Start: 2022-04-04

## 2022-04-04 RX ORDER — ONDANSETRON 4 MG/1
4 TABLET, ORALLY DISINTEGRATING ORAL EVERY 30 MIN PRN
Status: CANCELLED | OUTPATIENT
Start: 2022-04-04

## 2022-04-04 RX ORDER — ONDANSETRON 2 MG/ML
INJECTION INTRAMUSCULAR; INTRAVENOUS PRN
Status: DISCONTINUED | OUTPATIENT
Start: 2022-04-04 | End: 2022-04-04

## 2022-04-04 RX ORDER — NALOXONE HYDROCHLORIDE 0.4 MG/ML
0.2 INJECTION, SOLUTION INTRAMUSCULAR; INTRAVENOUS; SUBCUTANEOUS
Status: DISCONTINUED | OUTPATIENT
Start: 2022-04-04 | End: 2022-04-06 | Stop reason: HOSPADM

## 2022-04-04 RX ORDER — NALOXONE HYDROCHLORIDE 0.4 MG/ML
0.4 INJECTION, SOLUTION INTRAMUSCULAR; INTRAVENOUS; SUBCUTANEOUS
Status: DISCONTINUED | OUTPATIENT
Start: 2022-04-04 | End: 2022-04-06 | Stop reason: HOSPADM

## 2022-04-04 RX ORDER — LIDOCAINE HYDROCHLORIDE 20 MG/ML
INJECTION, SOLUTION INFILTRATION; PERINEURAL PRN
Status: DISCONTINUED | OUTPATIENT
Start: 2022-04-04 | End: 2022-04-04

## 2022-04-04 RX ORDER — ONDANSETRON 2 MG/ML
4 INJECTION INTRAMUSCULAR; INTRAVENOUS EVERY 30 MIN PRN
Status: CANCELLED | OUTPATIENT
Start: 2022-04-04

## 2022-04-04 RX ORDER — GLYCOPYRROLATE 0.2 MG/ML
INJECTION, SOLUTION INTRAMUSCULAR; INTRAVENOUS PRN
Status: DISCONTINUED | OUTPATIENT
Start: 2022-04-04 | End: 2022-04-04

## 2022-04-04 RX ORDER — PROPOFOL 10 MG/ML
INJECTION, EMULSION INTRAVENOUS CONTINUOUS PRN
Status: DISCONTINUED | OUTPATIENT
Start: 2022-04-04 | End: 2022-04-04

## 2022-04-04 RX ORDER — ROSUVASTATIN CALCIUM 5 MG/1
5 TABLET, COATED ORAL EVERY EVENING
Status: DISCONTINUED | OUTPATIENT
Start: 2022-04-04 | End: 2022-04-06 | Stop reason: HOSPADM

## 2022-04-04 RX ORDER — FLUMAZENIL 0.1 MG/ML
0.2 INJECTION, SOLUTION INTRAVENOUS
Status: CANCELLED | OUTPATIENT
Start: 2022-04-04 | End: 2022-04-04

## 2022-04-04 RX ORDER — FENTANYL CITRATE 50 UG/ML
25 INJECTION, SOLUTION INTRAMUSCULAR; INTRAVENOUS EVERY 5 MIN PRN
Status: CANCELLED | OUTPATIENT
Start: 2022-04-04

## 2022-04-04 RX ORDER — OXYCODONE HYDROCHLORIDE 5 MG/1
5 TABLET ORAL EVERY 4 HOURS PRN
Status: DISCONTINUED | OUTPATIENT
Start: 2022-04-04 | End: 2022-04-04 | Stop reason: HOSPADM

## 2022-04-04 RX ORDER — LOSARTAN POTASSIUM 25 MG/1
25 TABLET ORAL DAILY
Status: DISCONTINUED | OUTPATIENT
Start: 2022-04-04 | End: 2022-04-06 | Stop reason: HOSPADM

## 2022-04-04 RX ORDER — PROPOFOL 10 MG/ML
INJECTION, EMULSION INTRAVENOUS PRN
Status: DISCONTINUED | OUTPATIENT
Start: 2022-04-04 | End: 2022-04-04

## 2022-04-04 RX ORDER — ONDANSETRON 2 MG/ML
4 INJECTION INTRAMUSCULAR; INTRAVENOUS
Status: DISCONTINUED | OUTPATIENT
Start: 2022-04-04 | End: 2022-04-04 | Stop reason: HOSPADM

## 2022-04-04 RX ORDER — SODIUM CHLORIDE, SODIUM LACTATE, POTASSIUM CHLORIDE, CALCIUM CHLORIDE 600; 310; 30; 20 MG/100ML; MG/100ML; MG/100ML; MG/100ML
INJECTION, SOLUTION INTRAVENOUS CONTINUOUS
Status: DISCONTINUED | OUTPATIENT
Start: 2022-04-04 | End: 2022-04-04 | Stop reason: HOSPADM

## 2022-04-04 RX ORDER — POTASSIUM CHLORIDE 1500 MG/1
40 TABLET, EXTENDED RELEASE ORAL ONCE
Status: COMPLETED | OUTPATIENT
Start: 2022-04-04 | End: 2022-04-04

## 2022-04-04 RX ORDER — SPIRONOLACTONE 25 MG/1
25 TABLET ORAL DAILY
Status: DISCONTINUED | OUTPATIENT
Start: 2022-04-04 | End: 2022-04-06 | Stop reason: HOSPADM

## 2022-04-04 RX ADMIN — HYDRALAZINE HYDROCHLORIDE 25 MG: 25 TABLET, FILM COATED ORAL at 15:36

## 2022-04-04 RX ADMIN — PHENYLEPHRINE HYDROCHLORIDE 100 MCG: 10 INJECTION INTRAVENOUS at 10:41

## 2022-04-04 RX ADMIN — INSULIN ASPART 1 UNITS: 100 INJECTION, SOLUTION INTRAVENOUS; SUBCUTANEOUS at 17:44

## 2022-04-04 RX ADMIN — SPIRONOLACTONE 25 MG: 25 TABLET, FILM COATED ORAL at 17:45

## 2022-04-04 RX ADMIN — IRON SUCROSE 200 MG: 20 INJECTION, SOLUTION INTRAVENOUS at 09:30

## 2022-04-04 RX ADMIN — ROSUVASTATIN CALCIUM 5 MG: 5 TABLET, FILM COATED ORAL at 20:29

## 2022-04-04 RX ADMIN — HYDRALAZINE HYDROCHLORIDE 25 MG: 25 TABLET, FILM COATED ORAL at 05:51

## 2022-04-04 RX ADMIN — PHENYLEPHRINE HYDROCHLORIDE 100 MCG: 10 INJECTION INTRAVENOUS at 10:49

## 2022-04-04 RX ADMIN — Medication 40 MG: at 17:46

## 2022-04-04 RX ADMIN — HYDRALAZINE HYDROCHLORIDE 25 MG: 25 TABLET, FILM COATED ORAL at 22:49

## 2022-04-04 RX ADMIN — FUROSEMIDE 20 MG: 20 TABLET ORAL at 12:59

## 2022-04-04 RX ADMIN — PROPOFOL 50 MG: 10 INJECTION, EMULSION INTRAVENOUS at 10:25

## 2022-04-04 RX ADMIN — ONDANSETRON 4 MG: 2 INJECTION INTRAMUSCULAR; INTRAVENOUS at 10:28

## 2022-04-04 RX ADMIN — LIDOCAINE HYDROCHLORIDE 60 MG: 20 INJECTION, SOLUTION INFILTRATION; PERINEURAL at 10:25

## 2022-04-04 RX ADMIN — Medication 40 MG: at 12:58

## 2022-04-04 RX ADMIN — METOPROLOL SUCCINATE 25 MG: 25 TABLET, EXTENDED RELEASE ORAL at 09:38

## 2022-04-04 RX ADMIN — PROPOFOL 150 MCG/KG/MIN: 10 INJECTION, EMULSION INTRAVENOUS at 10:25

## 2022-04-04 RX ADMIN — LOSARTAN POTASSIUM 25 MG: 25 TABLET, FILM COATED ORAL at 17:45

## 2022-04-04 RX ADMIN — GLYCOPYRROLATE 0.2 MG: 0.2 INJECTION, SOLUTION INTRAMUSCULAR; INTRAVENOUS at 10:31

## 2022-04-04 RX ADMIN — DOCUSATE SODIUM 50 MG AND SENNOSIDES 8.6 MG 1 TABLET: 8.6; 5 TABLET, FILM COATED ORAL at 20:29

## 2022-04-04 RX ADMIN — POTASSIUM CHLORIDE 40 MEQ: 1500 TABLET, EXTENDED RELEASE ORAL at 17:53

## 2022-04-04 RX ADMIN — ASPIRIN 81 MG: 81 TABLET, COATED ORAL at 12:58

## 2022-04-04 RX ADMIN — SODIUM CHLORIDE, POTASSIUM CHLORIDE, SODIUM LACTATE AND CALCIUM CHLORIDE: 600; 310; 30; 20 INJECTION, SOLUTION INTRAVENOUS at 10:22

## 2022-04-04 ASSESSMENT — ACTIVITIES OF DAILY LIVING (ADL)
ADLS_ACUITY_SCORE: 14
ADLS_ACUITY_SCORE: 16
ADLS_ACUITY_SCORE: 12
ADLS_ACUITY_SCORE: 14
ADLS_ACUITY_SCORE: 14
ADLS_ACUITY_SCORE: 16
ADLS_ACUITY_SCORE: 12
ADLS_ACUITY_SCORE: 16
ADLS_ACUITY_SCORE: 14
ADLS_ACUITY_SCORE: 12
ADLS_ACUITY_SCORE: 16
ADLS_ACUITY_SCORE: 14
ADLS_ACUITY_SCORE: 14
ADLS_ACUITY_SCORE: 16
ADLS_ACUITY_SCORE: 14
ADLS_ACUITY_SCORE: 14
ADLS_ACUITY_SCORE: 12
ADLS_ACUITY_SCORE: 16
ADLS_ACUITY_SCORE: 14
ADLS_ACUITY_SCORE: 12

## 2022-04-04 ASSESSMENT — COPD QUESTIONNAIRES: COPD: 1

## 2022-04-04 NOTE — ANESTHESIA CARE TRANSFER NOTE
Patient: Solitario Benjamin    Procedure: Procedure(s):  ESOPHAGOGASTRODUODENOSCOPY (EGD) , AND  COLONOSCOPY       Diagnosis: Symptomatic anemia [D64.9]  Diagnosis Additional Information: No value filed.    Anesthesia Type:   General     Note:    Oropharynx: oropharynx clear of all foreign objects and spontaneously breathing  Level of Consciousness: drowsy  Oxygen Supplementation: room air    Independent Airway: airway patency satisfactory and stable  Dentition: dentition unchanged  Vital Signs Stable: post-procedure vital signs reviewed and stable  Report to RN Given: handoff report given  Patient transferred to: Medical/Surgical Unit  Comments: Report given to bedside RN. Pt vitals stable, verbalizing needs, on room air when sent with pt transport.  Handoff Report: Identifed the Patient, Identified the Reponsible Provider, Reviewed the pertinent medical history, Discussed the surgical course, Reviewed Intra-OP anesthesia mangement and issues during anesthesia, Set expectations for post-procedure period and Allowed opportunity for questions and acknowledgement of understanding      Vitals:  Vitals Value Taken Time   /51 04/04/22 1103   Temp 36.8 C 04/04/22 1103   Pulse 87 04/04/22 1103   Resp 14 04/04/22 1103   SpO2 98 04/04/22 1103       Electronically Signed By: SANTOS Mercedes CRNA  April 4, 2022  11:09 AM

## 2022-04-04 NOTE — ANESTHESIA PREPROCEDURE EVALUATION
Anesthesia Pre-Procedure Evaluation    Patient: Solitario Benjamin   MRN: [id number] : 1935        Procedure : Procedure(s):  ESOPHAGOGASTRODUODENOSCOPY (EGD) and Colonoscopy          Past Medical History:   Diagnosis Date     Accelerated hypertension 2018     Acute on chronic systolic heart failure (H) 2022     Centrilobular emphysema (H) 2022     Elevated troponin level not due to acute coronary syndrome 2022     Type 2 diabetes mellitus without complication, without long-term current use of insulin (H) 2018      History reviewed. No pertinent surgical history.   No Known Allergies   Social History     Tobacco Use     Smoking status: Former Smoker     Types: Cigarettes, Cigarettes     Quit date: 1983     Years since quittin.2     Smokeless tobacco: Former User   Substance Use Topics     Alcohol use: No      Wt Readings from Last 1 Encounters:   22 84.1 kg (185 lb 4.8 oz)        Anesthesia Evaluation            ROS/MED HX  ENT/Pulmonary:  - neg pulmonary ROS   (+) COPD,     Neurologic:  - neg neurologic ROS     Cardiovascular:  - neg cardiovascular ROS   (+) hypertension-----ALVARAOD.     METS/Exercise Tolerance:     Hematologic:  - neg hematologic  ROS     Musculoskeletal:  - neg musculoskeletal ROS     GI/Hepatic:  - neg GI/hepatic ROS     Renal/Genitourinary:  - neg Renal ROS   (+) renal disease,     Endo:  - neg endo ROS   (+) type I DM,     Psychiatric/Substance Use:  - neg psychiatric ROS     Infectious Disease:  - neg infectious disease ROS     Malignancy:  - neg malignancy ROS     Other:  - neg other ROS          Physical Exam    Airway        Mallampati: II    Neck ROM: full     Respiratory Devices and Support         Dental           Cardiovascular   cardiovascular exam normal          Pulmonary   pulmonary exam normal                OUTSIDE LABS:  CBC:   Lab Results   Component Value Date    WBC 7.4 2022    WBC 6.7 2022    HGB 9.0 (L) 2022    HGB 9.0  (L) 04/04/2022    HCT 29.5 (L) 04/04/2022    HCT 27.4 (L) 04/03/2022     04/04/2022     04/03/2022     BMP:   Lab Results   Component Value Date     04/04/2022     04/03/2022    POTASSIUM 3.3 (L) 04/04/2022    POTASSIUM 3.5 04/03/2022    CHLORIDE 104 04/04/2022    CHLORIDE 104 04/03/2022    CO2 25 04/04/2022    CO2 23 04/03/2022    BUN 10 04/04/2022    BUN 17 04/03/2022    CR 1.00 04/04/2022    CR 1.11 04/03/2022     (H) 04/04/2022     (H) 04/04/2022     COAGS:   Lab Results   Component Value Date    PTT 30 04/01/2022    INR 1.24 (H) 04/01/2022     POC: No results found for: BGM, HCG, HCGS  HEPATIC:   Lab Results   Component Value Date    ALBUMIN 3.3 (L) 04/02/2022    PROTTOTAL 5.9 (L) 04/02/2022    ALT 16 04/02/2022    AST 24 04/02/2022    ALKPHOS 69 04/02/2022    BILITOTAL 1.4 (H) 04/02/2022     OTHER:   Lab Results   Component Value Date    LACT 1.9 01/21/2018    A1C 5.7 (H) 04/01/2022    GUILLE 8.9 04/04/2022    MAG 2.0 04/01/2022    TSH 6.98 (H) 04/01/2022    T4 0.82 04/02/2022    CRP 1.6 (H) 01/22/2018       Anesthesia Plan    ASA Status:  3   NPO Status:  NPO Appropriate    Anesthesia Type: MAC.              Consents    Anesthesia Plan(s) and associated risks, benefits, and realistic alternatives discussed. Questions answered and patient/representative(s) expressed understanding.    - Discussed:     - Discussed with:  Patient         Postoperative Care       PONV prophylaxis: Ondansetron (or other 5HT-3), Dexamethasone or Solumedrol     Comments:                Todd Gerber MD

## 2022-04-04 NOTE — ANESTHESIA POSTPROCEDURE EVALUATION
Patient: Solitario Benjamin    Procedure: Procedure(s):  ESOPHAGOGASTRODUODENOSCOPY (EGD) , AND  COLONOSCOPY       Anesthesia Type:  General    Note:  Disposition: Inpatient   Postop Pain Control: Uneventful            Sign Out: Well controlled pain   PONV: No   Neuro/Psych: Uneventful            Sign Out: Acceptable/Baseline neuro status   Airway/Respiratory: Uneventful            Sign Out: Acceptable/Baseline resp. status   CV/Hemodynamics: Uneventful            Sign Out: Acceptable CV status; No obvious hypovolemia; No obvious fluid overload   Other NRE: NONE   DID A NON-ROUTINE EVENT OCCUR? No           Last vitals:  Vitals Value Taken Time   BP     Temp     Pulse 69 04/04/22 1252   Resp     SpO2 92 % 04/04/22 1252   Vitals shown include unvalidated device data.    Electronically Signed By: Todd Gerber MD  April 4, 2022  12:54 PM

## 2022-04-04 NOTE — PLAN OF CARE
Problem: Anemia  Goal: Anemia Symptom Improvement  Outcome: Ongoing, Progressing  Intervention: Monitor and Manage Anemia  Recent Flowsheet Documentation  Taken 4/3/2022 2310 by Juan Diego Gross, RN  Safety Promotion/Fall Prevention:   bed alarm on   clutter free environment maintained   fall prevention program maintained   mobility aid in reach   nonskid shoes/slippers when out of bed   patient and family education   room door open   room organization consistent   safety round/check completed  Taken 4/3/2022 1930 by Juan Diego Gross, RN  Safety Promotion/Fall Prevention:   bed alarm on   clutter free environment maintained   fall prevention program maintained   mobility aid in reach   nonskid shoes/slippers when out of bed   patient and family education   room door open   room organization consistent   safety round/check completed   Goal Outcome Evaluation:  Patient denied pain, SOB or nausea. No signs of bleeding. NPO for EGD/colonoscopy. Bowel prep completed, patient tolerated it well. Stools loose, brownish yellow in color this morning.

## 2022-04-04 NOTE — PROGRESS NOTES
04/04/22 0840   Quick Adds   Type of Visit Initial Occupational Therapy Evaluation   Living Environment   People in Home alone   Current Living Arrangements condominium   Home Accessibility no concerns   Living Environment Comments Walkin shower   Self-Care   Usual Activity Tolerance good   Current Activity Tolerance fair   Equipment Currently Used at Home walker, rolling;grab bar, toilet;grab bar, tub/shower;shower chair   Fall history within last six months yes   Number of times patient has fallen within last six months 1   Activity/Exercise/Self-Care Comment Pt. is independent w/ all ADL/IADL, med mgmt. and driving    General Information   Onset of Illness/Injury or Date of Surgery 04/01/22   Referring Physician Jessenia Estevez MD   Patient/Family Therapy Goal Statement (OT) Get back so I don't need a lot of help.    Existing Precautions/Restrictions fall   Limitations/Impairments hearing   Left Upper Extremity (Weight-bearing Status) full weight-bearing (FWB)   Cognitive Status Examination   Orientation Status person;place   Affect/Mental Status (Cognitive) WFL   Follows Commands WFL   Memory Deficit short-term memory   Attention Deficit minimal deficit   Cognitive Status Comments Pt. feels his short term and some long term memory has declined in the last 6 months.     Visual Perception   Visual Impairment/Limitations corrective lenses full-time   Sensory   Sensory Quick Adds No deficits were identified   Pain Assessment   Patient Currently in Pain No   Integumentary/Edema   Integumentary/Edema no deficits were identifed   Posture   Posture protracted shoulders;forward head position   Range of Motion Comprehensive   General Range of Motion upper extremity range of motion deficits identified   General Upper Extremity Assessment (Range of Motion)   Upper Extremity: Range of Motion shoulder, left: UE ROM;shoulder, right: UE ROM   Comment: Upper Extremity ROM AROM shd. flex <20 bilaterally.  RUE 45 deg abd,  LUE 90 deg. abd.    Strength Comprehensive (MMT)   General Manual Muscle Testing (MMT) Assessment upper extremity strength deficits identified   Comment, General Manual Muscle Testing (MMT) Assessment 2+/5 shd. all other WFL.    Upper Extremity (Manual Muscle Testing)   Upper Extremity: Manual Muscle Testing (MMT) left shoulder strength deficit;right shoulder strength deficit   Muscle Tone Assessment   Muscle Tone Quick Adds No deficits were identified   Coordination   Upper Extremity Coordination No deficits were identified   Bed Mobility   Comment (Bed Mobility) SBA supine to sit flat bed w/ no rail.    Transfers   Transfer Comments CGA bed and toilet   Balance   Balance Comments CGA static and dynamic standing balance.   Activities of Daily Living   BADL Assessment/Intervention toileting;lower body dressing   Lower Body Dressing Assessment/Training   [place] Level (Lower Body Dressing) contact guard assist   Comment, (Lower Body Dressing) CGA don pants - posterior LOB, SBA don/doff socks   Toileting   [place] Level (Toileting) contact guard assist   Clinical Impression   Criteria for Skilled Therapeutic Interventions Met (OT) Yes, treatment indicated   OT Diagnosis impaired self cares and functional mobility   OT Problem List-Impairments impacting ADL activity tolerance impaired;balance;cognition;mobility;range of motion (ROM);strength   Assessment of Occupational Performance 5 or more Performance Deficits   Identified Performance Deficits dressing, toileting, transfers, grooming, bathing, IADL   Planned Therapy Interventions (OT) ADL retraining;balance training;bed mobility training;cognition;strengthening;transfer training;progressive activity/exercise;risk factor education  (CHF education)   Clinical Decision Making Complexity (OT) low complexity   Risk & Benefits of therapy have been explained evaluation/treatment results reviewed;care plan/treatment goals reviewed;participants voiced agreement with  care plan;participants included;patient   OT Discharge Planning   OT Discharge Recommendation (DC Rec) Transitional Care Facility;home with assist;home with home care occupational therapy   OT Rationale for DC Rec Rec. TCU as Pt. is assist of 1 for mobility and self cares, activity tolerance and balance is below baseline.  Pt. reports having recent memory changes further evaluation is indicated.  If pt. returns home he will need 24 hour care to ensure safety.    Total Evaluation Time (Minutes)   Total Evaluation Time (Minutes) 10   OT Goals   Therapy Frequency (OT) Daily   OT Predicated Duration/Target Date for Goal Attainment 04/11/22   OT Goals Cognition;Transfers;OT Goal 1;OT Goal 2;OT Goal 3   OT: Transfer Supervision/stand-by assist   OT: Cognitive Patient/caregiver will verbalize understanding of cognitive assessment results/recommendations as needed for safe discharge planning   OT: Goal 1 Pt. will complete full body dressing tasks with SBA.    OT: Goal 2 Pt. will consistently complete BUE AAROM/AROM exercise program to increase functional strength for ADL/IADL   OT: Goal 3 Pt. will complete and verbalize understanding of CHF education including Signs/Symptoms, Stoplight Tool, Daily Weight

## 2022-04-04 NOTE — PROGRESS NOTES
[facility]    Hospitalist Progress Note    Assessment & Plan   86 year old male who was admitted on 4/1/2022 with severe anemia, hgb 4.6:    Impression:   Principal Problem:    Acute blood loss anemia -- suspect GI loss, possibly from esophagitis and gastritis and aspirin    GERD with Esophagitis    Gastritis   -- possible source of GI blood loss, continue PPI bid      Iron deficiency anemia    -- transfused, got IV iron      Ecchymosis of forearm -- suspect from trauma, additional source of blood loss      Acute on chronic systolic heart failure       NSTEMI -- demand ischemia      Active Problems:    Accelerated hypertension      DM type 2, Hgb A1C 5.7 on 4/1/22      COPD w Centrilobular emphysema       Prostate cancer (H)      Plan:  Discussed with GI -- no plans for PillCam at this time     DVT Prophylaxis: Pneumatic Compression Devices  Code Status: No CPR- Do NOT Intubate    Disposition: Expected discharge tomorrow if hgb stable.     Navin Pisano [facility]  Pager [phone]  Cell Phone [phone]  Text Page (7am to 6pm)    Interval History   Feels OK, less SOB, no chest pain, he is not aware of any melena, and does not recall how he bruised his left arm.     Physical Exam   Temp: 98.2  F (36.8  C) Temp src: Oral BP: (!) 157/65 Pulse: 76   Resp: 18 SpO2: 93 % O2 Device: Nasal cannula    Vitals:    04/02/22 0325 04/03/22 0406 04/04/22 0342   Weight: 88.2 kg (194 lb 6.4 oz) 86.1 kg (189 lb 12.8 oz) 84.1 kg (185 lb 4.8 oz)     Vital Signs with Ranges  Temp:  [97.5  F (36.4  C)-98.2  F (36.8  C)] 98.2  F (36.8  C)  Pulse:  [72-82] 76  Resp:  [16-20] 18  BP: (135-165)/(63-73) 157/65  SpO2:  [90 %-95 %] 93 %  I/O last 3 completed shifts:  In: 1225 [P.O.:1225]  Out: 1800 [Urine:1800]    # Pain Assessment:  Current Pain Score 4/4/2022   Patient currently in pain? karthik Jerez s pain level was assessed and he currently denies pain.        Constitutional: Awake, alert, cooperative, no  apparent distress  Respiratory: slight expiratory wheeze bilaterally   Cardiovascular: Regular rate and rhythm, normal S1 and S2, and no murmur noted  GI: Normal bowel sounds, soft, non-distended, non-tender  Extrem: No calf tenderness, no ankle edema  Neuro: Ox3, no focal motor or sensory deficits    Medications     lactated ringers         [Auto Hold] aspirin  81 mg Oral Daily     [Auto Hold] - MEDICATION INSTRUCTIONS -   Does not apply See Admin Instructions     [Auto Hold] furosemide  20 mg Oral Daily     [Auto Hold] hydrALAZINE  25 mg Oral Q8H STEPHANE     [Auto Hold] insulin aspart  1-3 Units Subcutaneous TID AC     [Auto Hold] loratadine  10 mg Oral Daily     [Auto Hold] metoprolol succinate ER  25 mg Oral Daily     [Auto Hold] pantoprazole (PROTONIX) IV  40 mg Intravenous Q24H     [Auto Hold] senna-docusate  1 tablet Oral BID     sodium chloride (PF)  3 mL Intracatheter Q8H     sodium chloride (PF)  3 mL Intracatheter Q8H     [Auto Hold] sodium chloride (PF)  3 mL Intracatheter Q8H       Data   Recent Labs   Lab 04/04/22  0957 04/04/22  0750 04/04/22  0527 04/03/22  2048 04/03/22  1841 04/03/22  0751 04/03/22  0543 04/02/22  0606 04/02/22  0521 04/01/22 2121 04/01/22  1838   WBC  --   --  7.4  --   --   --  6.7  --  7.1  --  8.1   HGB  --   --  9.0*  9.0*  --  8.5*  --  8.3*  8.3*   < > 7.0*  --  4.6*   MCV  --   --  72*  --   --   --  71*  --  74*  --  63*   PLT  --   --  204  --   --   --  182  --  224  --  258   INR  --   --   --   --   --   --   --   --   --   --  1.24*   NA  --   --  137  --   --   --  140  --  138  --  136   POTASSIUM  --   --  3.3*  --   --   --  3.5  --  3.7  --  3.9   CHLORIDE  --   --  104  --   --   --  104  --  107  --  106   CO2  --   --  25  --   --   --  23  --  19*  --  18*   BUN  --   --  10  --   --   --  17  --  20  --  21   CR  --   --  1.00  --   --   --  1.11  --  1.17  --  1.22   ANIONGAP  --   --  8  --   --   --  13  --  12  --  12   GUILLE  --   --  8.9  --   --   --   8.7  --  8.8  --  8.9   * 113* 103   < >  --    < > 118   < > 103   < > 135*   ALBUMIN  --   --   --   --   --   --   --   --  3.3*  --  3.4*   PROTTOTAL  --   --   --   --   --   --   --   --  5.9*  --  5.9*   BILITOTAL  --   --   --   --   --   --   --   --  1.4*  --  1.0   ALKPHOS  --   --   --   --   --   --   --   --  69  --  62   ALT  --   --   --   --   --   --   --   --  16  --  13   AST  --   --   --   --   --   --   --   --  24  --  25    < > = values in this interval not displayed.       Imaging:   No results found for this or any previous visit (from the past 24 hour(s)).

## 2022-04-04 NOTE — PROGRESS NOTES
Impression and Plan     Impression:   1. Acute new-onset congestive heart failure with reduced left ventricular ejection fraction of 25-30%, unclear cause at this time. He does not appear significantly fluid-overloaded.  2. Elevated troponin, likely demand-mediated from heart failure and anemia although this could suggest underlying obstructive coronary artery disease.  3. Acute on likely chronic microcytic iron deficiency anemia with left axillary hematoma. No obvious source of blood loss seen on endoscopic evaluation.  4. Severe coronary artery calcification on chest CT. He denies anginal symptoms.  5. Essential hypertension. Controlled now.  6. Dyslipidemia. Last LDL 45.  7. Non insulin-dependent diabetes mellitus type 2.  8. Falls and memory issues.    Plan:    Start losartan 25 mg and spironolactone 25 mg daily.    Continue metoprolol succinate 25 mg daily.    If his blood pressure remains controlled, we may be able to discontinue hydralazine to allow uptitration of his heart failure medication    Aspirin 81 mg daily    Although his LDL is low, given his coronary artery calcification and diabetes mellitus, we will start rosuvastatin 5 mg daily    Continue oral furosemide 20 mg daily    Defer on invasive coronary angiography at this time, as he is not having an acute coronary syndrome or anginal symptoms. I would like to see completion of his gastrointestinal workup first with stabilization of his hemoglobin, as well as reassessing his ability to take dual antiplatelet therapy if needed given his fall and memory issues    Primary Cardiologist: can establish with me    Subjective     - EGD showed esophagitis and gastritis, colonoscopy showed no large polyps  - still feels weak and short of breath    Cardiac Diagnostics   Telemetry (personally reviewed): SR, no significant arrhythmias    ECG 4/2/2022 (personally reviewed and interpreted): SR with PVCs and PACs, possible inferior infarct    Echocardiogram  "4/2/2022 (results reviewed):   Left ventricular function is decreased. The ejection fraction is 25-30%  (severely reduced).  There is mod-severe global hypokinesia of the left ventricle.  There is inferior & inferolateral wall akinesis.  Right ventrical function, chamber size, wall motion, and thickness are normal.  The left atrium is mildly dilated.  The right atrium is mildly dilated.  The right ventricular systolic pressure is approximated at 47mmHg plus the  right atrial pressure.  IVC diameter >2.1 cm collapsing <50% with sniff suggests a high RA pressure  estimated at 15 mmHg or greater.    Physical Examination       /59 (BP Location: Right arm)   Pulse 96   Temp 98.1  F (36.7  C) (Oral)   Resp 20   Ht 1.854 m (6' 1\")   Wt 84.1 kg (185 lb 4.8 oz)   SpO2 91%   BMI 24.45 kg/m          Wt Readings from Last 3 Encounters:   04/04/22 84.1 kg (185 lb 4.8 oz)   02/12/19 109.8 kg (242 lb)   12/11/18 109.8 kg (242 lb)     Intake/Output Summary (Last 24 hours) at 4/4/2022 1557  Last data filed at 4/4/2022 1351  Gross per 24 hour   Intake 1705 ml   Output 1750 ml   Net -45 ml     General: pleasant male. No acute distress.  HENT: external ears normal. Nares patent. Mucous membranes moist.  Eyes: perrla, extraocular muscles intact. No scleral icterus.   Neck: No JVD  Lungs: diminished breath sounds  COR: regular rate and rhythm, No murmurs, rubs, or gallops  Abd: nondistended, BS present  Extrem: No edema. Large left arm ecchymosis         Imaging      CTA chest 4/1/2022 (report reviewed):  1.  No pulmonary embolism demonstrated.  2.  Small bilateral pleural effusions and associated probable compressive atelectasis versus less likely infiltrate.  3.  At least moderate emphysema.  4.  Moderate hiatal hernia.  5.  Severe coronary artery calcification.    Left upper extremity ultrasound 4/2/2022 (report reviewed):  1.  No deep venous thrombosis in the left upper extremity.  2.  Probable 6.1 cm left axillary " hematoma.    Lab Results   Lab Results   Component Value Date     04/04/2022    CO2 25 04/04/2022    BUN 10 04/04/2022     Lab Results   Component Value Date    WBC 7.4 04/04/2022    HGB 9.0 04/04/2022    HGB 9.0 04/04/2022    HCT 29.5 04/04/2022    MCV 72 04/04/2022     04/04/2022     Lab Results   Component Value Date    CHOL 112 03/17/2021    TRIG 101 03/17/2021    HDL 45 03/17/2021     Lab Results   Component Value Date    INR 1.24 04/01/2022     Lab Results   Component Value Date     04/01/2022     Lab Results   Component Value Date    TROPONINI 1.05 04/03/2022    TROPONINI 0.95 04/02/2022    TROPONINI 0.69 04/02/2022     Lab Results   Component Value Date    TSH 6.98 04/01/2022           Current Inpatient Scheduled Medications   Scheduled Meds:    aspirin  81 mg Oral Daily     - MEDICATION INSTRUCTIONS -   Does not apply See Admin Instructions     furosemide  20 mg Oral Daily     hydrALAZINE  25 mg Oral Q8H STEPHANE     insulin aspart  1-3 Units Subcutaneous TID AC     loratadine  10 mg Oral Daily     losartan  25 mg Oral Daily     metoprolol succinate ER  25 mg Oral Daily     pantoprazole  40 mg Oral BID AC     senna-docusate  1 tablet Oral BID     sodium chloride (PF)  3 mL Intracatheter Q8H     spironolactone  25 mg Oral Daily     Continuous Infusions:         Medications Prior to Admission   Prior to Admission medications    Medication Sig Start Date End Date Taking? Authorizing Provider   calcium carbonate 600 mg-vitamin D 400 units (CALTRATE) 600-400 MG-UNIT per tablet Take 1 tablet by mouth daily   Yes Unknown, Entered By History   docusate sodium (COLACE) 100 MG capsule Take 100 mg by mouth daily   Yes Unknown, Entered By History   glucosamine-chondroitin 500-400 MG CAPS per capsule Take 3 capsules by mouth daily   Yes Unknown, Entered By History   ibuprofen (ADVIL/MOTRIN) 200 MG tablet Take 200 mg by mouth daily   Yes Unknown, Entered By History   multivitamin therapeutic tablet  [MULTIVITAMIN THERAPEUTIC TABLET] Take 1 tablet by mouth daily. 1/21/18  Yes Provider, Historical   Omega-3 Fatty Acids (FISH OIL) 1200 MG capsule Take 1,200 mg by mouth daily   Yes Unknown, Entered By History          Bernardo Linda MD [facility]  Non-Invasive Cardiologist  [facility]  Pager [phone]

## 2022-04-04 NOTE — PROGRESS NOTES
EGD/colonoscopy   --   Patient is NPO PMN. Metoprolol given with sip of water. Taken to OR per wheelchair, alert & oriented

## 2022-04-04 NOTE — CONSULTS
.  Care Management Initial Consult    General Information  Assessment completed with: Patient daughter Selena  Type of CM/SW Visit: Initial Assessment    Primary Care Provider verified and updated as needed:     Readmission within the last 30 days:           Advance Care Planning: Advance Care Planning Reviewed: concerns discussed          Communication Assessment  Patient's communication style: spoken language (English or Bilingual)    Hearing Difficulty or Deaf: yes   Wear Glasses or Blind: yes    Cognitive  Cognitive/Neuro/Behavioral: WDL        Orientation:  (forgetful)             Living Environment:   People in home: alone     Current living Arrangements: apartment      Able to return to prior arrangements: yes       Family/Social Support:  Care provided by: self  Provides care for: no one  Marital Status:   Children          Description of Support System: Supportive    Support Assessment: Lacks necessary supervision and assistance    Current Resources:   Patient receiving home care services: No     Community Resources: None  Equipment currently used at home: walker, standard  Supplies currently used at home: None    Employment/Financial:  Employment Status: retired        Financial Concerns:             Lifestyle & Psychosocial Needs:  Social Determinants of Health     Tobacco Use: Medium Risk     Smoking Tobacco Use: Former Smoker     Smokeless Tobacco Use: Former User   Alcohol Use: Not on file   Financial Resource Strain: Not on file   Food Insecurity: Not on file   Transportation Needs: Not on file   Physical Activity: Not on file   Stress: Not on file   Social Connections: Not on file   Intimate Partner Violence: Not on file   Depression: Not on file   Housing Stability: Not on file       Functional Status:  Prior to admission patient needed assistance:   Dependent ADLs:: Ambulation-walker  Dependent IADLs:: Transportation, Medication Management, Meal Preparation, Shopping, Cooking, Laundry,  Cleaning       Mental Health Status:  Mental Health Status: No Current Concerns       Chemical Dependency Status:  Chemical Dependency Status: No Current Concerns             Values/Beliefs:  Spiritual, Cultural Beliefs, Ep[employer] Practices, Values that affect care:                 Additional Information:  Chart reviewed. SW met with patient's daughter Selena who was at bedside, to complete initial assessment. Pt sleeping soundly following procedure.   Pt lives alone in a senior apartment, he is typically independent at baseline, uses a walker, had been driving.   Daugghter Selena reports pt has been needing more assist, has had difficulty with shopping, meal prep, household tasks. Daughter reports pt gave her permission this week to sell his car and they have been talking more about help pt may need in the future.   Patient's wife  about 7 years ago.   EUGENIE discussed TCU recommendation with daughter Selena who agrees with this plan. Provided daughter with TCU choice list in addition to senior linkage line information and senior housing guide.   EUGENIE will send initial referrals to TCUs in the area per daughter request.    MADELYN Mukherjee

## 2022-04-04 NOTE — PLAN OF CARE
Problem: Plan of Care - These are the overarching goals to be used throughout the patient stay.    Goal: Plan of Care Review/Shift Note  Description: The Plan of Care Review/Shift note should be completed every shift.  The Outcome Evaluation is a brief statement about your assessment that the patient is improving, declining, or no change.  This information will be displayed automatically on your shift note.  Outcome: Ongoing, Progressing     Problem: Anemia  Goal: Anemia Symptom Improvement  Outcome: Ongoing, Progressing  Intervention: Monitor and Manage Anemia  Recent Flowsheet Documentation  Taken 4/4/2022 1210 by Izzy Macario RN  Safety Promotion/Fall Prevention: bed alarm on  Taken 4/4/2022 0800 by Izzy Macario RN  Safety Promotion/Fall Prevention: bed alarm on     Problem: Cardiac Output Decreased  Goal: Effective Cardiac Output  Outcome: Ongoing, Progressing  Intervention: Optimize Cardiac Output  Recent Flowsheet Documentation  Taken 4/4/2022 1300 by Izzy Macario RN  Head of Bed (HOB) Positioning: HOB at 45 degrees  Taken 4/4/2022 1210 by Izzy Macairo RN  Head of Bed (HOB) Positioning: HOB at 20 degrees  Taken 4/4/2022 0800 by Izzy Macario RN  Head of Bed (HOB) Positioning: HOB at 20 degrees     Problem: Plan of Care - These are the overarching goals to be used throughout the patient stay.    Goal: Absence of Hospital-Acquired Illness or Injury  Intervention: Identify and Manage Fall Risk  Recent Flowsheet Documentation  Taken 4/4/2022 1210 by Izzy Macario RN  Safety Promotion/Fall Prevention: bed alarm on  Taken 4/4/2022 0800 by Izzy Macario RN  Safety Promotion/Fall Prevention: bed alarm on  Intervention: Prevent Skin Injury  Recent Flowsheet Documentation  Taken 4/4/2022 1300 by Izzy Macario RN  Body Position: position changed independently  Taken 4/4/2022 1210 by Izzy Macario RN  Body Position: position changed independently  Taken 4/4/2022 0800 by Izzy Macario RN  Body Position: position  changed independently  Intervention: Prevent and Manage VTE (Venous Thromboembolism) Risk  Recent Flowsheet Documentation  Taken 4/4/2022 1300 by Izzy Macario RN  Activity Management: bedrest  Taken 4/4/2022 1210 by Izzy Macario RN  Activity Management: bedrest with bathroom privileges  Taken 4/4/2022 0800 by Izzy Macario RN  Activity Management: bedrest with bathroom privileges   Goal Outcome Evaluation:      Anemia  -  patient's hemoglobin is improving today's HGB 9.0 EGD/colonoscopy is scheduled this AM.                     Patient is NPO PMN for this procedure. Came back, pt is alert & a little                      groggy.  Patient's daughter & son are @ the bedside.

## 2022-04-05 ENCOUNTER — APPOINTMENT (OUTPATIENT)
Dept: PHYSICAL THERAPY | Facility: HOSPITAL | Age: 87
DRG: 368 | End: 2022-04-05
Payer: COMMERCIAL

## 2022-04-05 ENCOUNTER — APPOINTMENT (OUTPATIENT)
Dept: OCCUPATIONAL THERAPY | Facility: HOSPITAL | Age: 87
DRG: 368 | End: 2022-04-05
Payer: COMMERCIAL

## 2022-04-05 DIAGNOSIS — I50.22 CHRONIC SYSTOLIC CONGESTIVE HEART FAILURE (H): Primary | ICD-10-CM

## 2022-04-05 PROBLEM — E44.0 MODERATE MALNUTRITION (H): Status: ACTIVE | Noted: 2022-04-05

## 2022-04-05 LAB
ANION GAP SERPL CALCULATED.3IONS-SCNC: 10 MMOL/L (ref 5–18)
BUN SERPL-MCNC: 11 MG/DL (ref 8–28)
CALCIUM SERPL-MCNC: 8.8 MG/DL (ref 8.5–10.5)
CHLORIDE BLD-SCNC: 106 MMOL/L (ref 98–107)
CO2 SERPL-SCNC: 23 MMOL/L (ref 22–31)
CREAT SERPL-MCNC: 1.13 MG/DL (ref 0.7–1.3)
ERYTHROCYTE [DISTWIDTH] IN BLOOD BY AUTOMATED COUNT: 27.3 % (ref 10–15)
GFR SERPL CREATININE-BSD FRML MDRD: 64 ML/MIN/1.73M2
GLUCOSE BLD-MCNC: 107 MG/DL (ref 70–125)
GLUCOSE BLDC GLUCOMTR-MCNC: 122 MG/DL (ref 70–99)
GLUCOSE BLDC GLUCOMTR-MCNC: 131 MG/DL (ref 70–99)
GLUCOSE BLDC GLUCOMTR-MCNC: 203 MG/DL (ref 70–99)
GLUCOSE BLDC GLUCOMTR-MCNC: 98 MG/DL (ref 70–99)
HCT VFR BLD AUTO: 30.6 % (ref 40–53)
HGB BLD-MCNC: 8.7 G/DL (ref 13.3–17.7)
MCH RBC QN AUTO: 21.9 PG (ref 26.5–33)
MCHC RBC AUTO-ENTMCNC: 28.4 G/DL (ref 31.5–36.5)
MCV RBC AUTO: 77 FL (ref 78–100)
PATH REPORT.COMMENTS IMP SPEC: NORMAL
PATH REPORT.COMMENTS IMP SPEC: NORMAL
PATH REPORT.FINAL DX SPEC: NORMAL
PATH REPORT.GROSS SPEC: NORMAL
PATH REPORT.MICROSCOPIC SPEC OTHER STN: NORMAL
PATH REPORT.RELEVANT HX SPEC: NORMAL
PHOTO IMAGE: NORMAL
PLATELET # BLD AUTO: 214 10E3/UL (ref 150–450)
POTASSIUM BLD-SCNC: 3.8 MMOL/L (ref 3.5–5)
RBC # BLD AUTO: 3.97 10E6/UL (ref 4.4–5.9)
SODIUM SERPL-SCNC: 139 MMOL/L (ref 136–145)
WBC # BLD AUTO: 7.7 10E3/UL (ref 4–11)

## 2022-04-05 PROCEDURE — 97130 THER IVNTJ EA ADDL 15 MIN: CPT | Mod: GO

## 2022-04-05 PROCEDURE — 250N000013 HC RX MED GY IP 250 OP 250 PS 637: Performed by: INTERNAL MEDICINE

## 2022-04-05 PROCEDURE — 85027 COMPLETE CBC AUTOMATED: CPT | Performed by: HOSPITALIST

## 2022-04-05 PROCEDURE — 120N000004 HC R&B MS OVERFLOW

## 2022-04-05 PROCEDURE — 99232 SBSQ HOSP IP/OBS MODERATE 35: CPT | Performed by: INTERNAL MEDICINE

## 2022-04-05 PROCEDURE — 36415 COLL VENOUS BLD VENIPUNCTURE: CPT | Performed by: HOSPITALIST

## 2022-04-05 PROCEDURE — 80048 BASIC METABOLIC PNL TOTAL CA: CPT | Performed by: HOSPITALIST

## 2022-04-05 PROCEDURE — 97129 THER IVNTJ 1ST 15 MIN: CPT | Mod: GO

## 2022-04-05 PROCEDURE — 97116 GAIT TRAINING THERAPY: CPT | Mod: GP

## 2022-04-05 PROCEDURE — 97110 THERAPEUTIC EXERCISES: CPT | Mod: GP

## 2022-04-05 PROCEDURE — 99232 SBSQ HOSP IP/OBS MODERATE 35: CPT | Performed by: GENERAL ACUTE CARE HOSPITAL

## 2022-04-05 PROCEDURE — 250N000013 HC RX MED GY IP 250 OP 250 PS 637: Performed by: GENERAL ACUTE CARE HOSPITAL

## 2022-04-05 RX ORDER — AMOXICILLIN 250 MG
1 CAPSULE ORAL 2 TIMES DAILY
Refills: 0
Start: 2022-04-05

## 2022-04-05 RX ORDER — PANTOPRAZOLE SODIUM 40 MG/1
40 TABLET, DELAYED RELEASE ORAL 2 TIMES DAILY
Refills: 0
Start: 2022-04-05

## 2022-04-05 RX ORDER — CHOLECALCIFEROL (VITAMIN D3) 50 MCG
1 TABLET ORAL DAILY
Refills: 0
Start: 2022-04-05 | End: 2023-11-29

## 2022-04-05 RX ORDER — METOPROLOL SUCCINATE 25 MG/1
25 TABLET, EXTENDED RELEASE ORAL DAILY
Refills: 0
Start: 2022-04-06

## 2022-04-05 RX ORDER — SPIRONOLACTONE 25 MG/1
25 TABLET ORAL DAILY
Refills: 0
Start: 2022-04-06

## 2022-04-05 RX ORDER — LOSARTAN POTASSIUM 25 MG/1
25 TABLET ORAL DAILY
Refills: 0
Start: 2022-04-06

## 2022-04-05 RX ORDER — ALBUTEROL SULFATE 0.83 MG/ML
2.5 SOLUTION RESPIRATORY (INHALATION) EVERY 6 HOURS PRN
Qty: 90 ML | Refills: 0
Start: 2022-04-05 | End: 2024-03-26

## 2022-04-05 RX ORDER — FUROSEMIDE 20 MG
20 TABLET ORAL DAILY
Refills: 0
Start: 2022-04-06

## 2022-04-05 RX ORDER — ACETAMINOPHEN 325 MG/1
650 TABLET ORAL EVERY 4 HOURS PRN
Refills: 0
Start: 2022-04-05

## 2022-04-05 RX ORDER — ROSUVASTATIN CALCIUM 5 MG/1
5 TABLET, COATED ORAL EVERY EVENING
Refills: 0
Start: 2022-04-05

## 2022-04-05 RX ORDER — ACETAMINOPHEN 325 MG/1
650 TABLET ORAL EVERY 4 HOURS PRN
Status: DISCONTINUED | OUTPATIENT
Start: 2022-04-05 | End: 2022-04-06 | Stop reason: HOSPADM

## 2022-04-05 RX ADMIN — ASPIRIN 81 MG: 81 TABLET, COATED ORAL at 09:25

## 2022-04-05 RX ADMIN — Medication 40 MG: at 17:01

## 2022-04-05 RX ADMIN — METOPROLOL SUCCINATE 25 MG: 25 TABLET, EXTENDED RELEASE ORAL at 09:25

## 2022-04-05 RX ADMIN — Medication 40 MG: at 09:20

## 2022-04-05 RX ADMIN — HYDRALAZINE HYDROCHLORIDE 25 MG: 25 TABLET, FILM COATED ORAL at 16:59

## 2022-04-05 RX ADMIN — HYDRALAZINE HYDROCHLORIDE 25 MG: 25 TABLET, FILM COATED ORAL at 05:20

## 2022-04-05 RX ADMIN — LOSARTAN POTASSIUM 25 MG: 25 TABLET, FILM COATED ORAL at 09:25

## 2022-04-05 RX ADMIN — DOCUSATE SODIUM 50 MG AND SENNOSIDES 8.6 MG 1 TABLET: 8.6; 5 TABLET, FILM COATED ORAL at 20:22

## 2022-04-05 RX ADMIN — SPIRONOLACTONE 25 MG: 25 TABLET, FILM COATED ORAL at 09:25

## 2022-04-05 RX ADMIN — ACETAMINOPHEN 650 MG: 325 TABLET ORAL at 12:45

## 2022-04-05 RX ADMIN — ROSUVASTATIN CALCIUM 5 MG: 5 TABLET, FILM COATED ORAL at 20:22

## 2022-04-05 RX ADMIN — FUROSEMIDE 20 MG: 20 TABLET ORAL at 09:25

## 2022-04-05 ASSESSMENT — ACTIVITIES OF DAILY LIVING (ADL)
ADLS_ACUITY_SCORE: 16

## 2022-04-05 NOTE — PROGRESS NOTES
[facility]    Hospitalist Progress Note    Assessment & Plan   86 year old male who was admitted on 4/1/2022 with severe anemia, hgb 4.6:    Impression:   Principal Problem:    Acute blood loss anemia -- suspect GI loss, possibly from esophagitis and gastritis and aspirin    GERD with Esophagitis    Gastritis   -- possible source of GI blood loss, continue PPI bid, Hgb stable       Iron deficiency anemia    -- transfused, got IV iron      Ecchymosis of forearm -- suspect from trauma, additional source of blood loss      Acute on chronic systolic heart failure       NSTEMI -- demand ischemia      Active Problems:    Accelerated hypertension      DM type 2, Hgb A1C 5.7 on 4/1/22      COPD w Centrilobular emphysema       Prostate cancer       Moderate Malnutrition       Plan:  Discussed with GI -- no plans for [facility] at this time     DVT Prophylaxis: Pneumatic Compression Devices  Code Status: No CPR- Do NOT Intubate    Disposition: Expected discharge to TCU tomorrow if bed available.     Navin Leonardo  Pager [phone]  Cell Phone [phone]  Text Page (7am to 6pm)    Interval History   Feels OK, still tired, agrees with plans for TCU at discharge.  Currently lives in an independent living apartment by himself.     Physical Exam   Temp: 98.2  F (36.8  C) Temp src: Oral BP: 124/67 Pulse: 64   Resp: 18 SpO2: 91 % O2 Device: None (Room air)    Vitals:    04/02/22 0325 04/03/22 0406 04/04/22 0342   Weight: 88.2 kg (194 lb 6.4 oz) 86.1 kg (189 lb 12.8 oz) 84.1 kg (185 lb 4.8 oz)     Vital Signs with Ranges  Temp:  [97.7  F (36.5  C)-99.2  F (37.3  C)] 98.2  F (36.8  C)  Pulse:  [64-89] 64  Resp:  [18-20] 18  BP: (111-158)/(58-76) 124/67  SpO2:  [90 %-92 %] 91 %  I/O last 3 completed shifts:  In: 1076 [P.O.:1076]  Out: 500 [Urine:500]    # Pain Assessment:  Current Pain Score 4/5/2022   Patient currently in pain? yes   Solitario s pain level was assessed and he currently denies pain.         Constitutional: Awake, alert, cooperative, no apparent distress  Respiratory: slight expiratory wheeze bilaterally   Cardiovascular: Regular rate and rhythm, normal S1 and S2, and no murmur noted  GI: Normal bowel sounds, soft, non-distended, non-tender  Extrem: No calf tenderness, no ankle edema  Neuro: Ox3, no focal motor or sensory deficits    Medications       aspirin  81 mg Oral Daily     - MEDICATION INSTRUCTIONS -   Does not apply See Admin Instructions     furosemide  20 mg Oral Daily     hydrALAZINE  25 mg Oral Q8H STEPHANE     insulin aspart  1-3 Units Subcutaneous TID AC     losartan  25 mg Oral Daily     metoprolol succinate ER  25 mg Oral Daily     pantoprazole  40 mg Oral BID AC     rosuvastatin  5 mg Oral QPM     senna-docusate  1 tablet Oral BID     sodium chloride (PF)  3 mL Intracatheter Q8H     spironolactone  25 mg Oral Daily       Data   Recent Labs   Lab 04/05/22  1703 04/05/22  1147 04/05/22  0826 04/05/22  0518 04/04/22  0750 04/04/22  0527 04/03/22  2048 04/03/22  1841 04/03/22  0751 04/03/22  0543 04/02/22  0606 04/02/22  0521 04/01/22  2121 04/01/22  1838   WBC  --   --   --  7.7  --  7.4  --   --   --  6.7  --  7.1  --  8.1   HGB  --   --   --  8.7*  --  9.0*  9.0*  --  8.5*  --  8.3*  8.3*   < > 7.0*  --  4.6*   MCV  --   --   --  77*  --  72*  --   --   --  71*  --  74*  --  63*   PLT  --   --   --  214  --  204  --   --   --  182  --  224  --  258   INR  --   --   --   --   --   --   --   --   --   --   --   --   --  1.24*   NA  --   --   --  139  --  137  --   --   --  140  --  138  --  136   POTASSIUM  --   --   --  3.8  --  3.3*  --   --   --  3.5  --  3.7  --  3.9   CHLORIDE  --   --   --  106  --  104  --   --   --  104  --  107  --  106   CO2  --   --   --  23  --  25  --   --   --  23  --  19*  --  18*   BUN  --   --   --  11  --  10  --   --   --  17  --  20  --  21   CR  --   --   --  1.13  --  1.00  --   --   --  1.11  --  1.17  --  1.22   ANIONGAP  --   --   --  10  --  8   --   --   --  13  --  12  --  12   GUILLE  --   --   --  8.8  --  8.9  --   --   --  8.7  --  8.8  --  8.9   GLC 98 131* 122* 107   < > 103   < >  --    < > 118   < > 103   < > 135*   ALBUMIN  --   --   --   --   --   --   --   --   --   --   --  3.3*  --  3.4*   PROTTOTAL  --   --   --   --   --   --   --   --   --   --   --  5.9*  --  5.9*   BILITOTAL  --   --   --   --   --   --   --   --   --   --   --  1.4*  --  1.0   ALKPHOS  --   --   --   --   --   --   --   --   --   --   --  69  --  62   ALT  --   --   --   --   --   --   --   --   --   --   --  16  --  13   AST  --   --   --   --   --   --   --   --   --   --   --  24  --  25    < > = values in this interval not displayed.       Imaging:   No results found for this or any previous visit (from the past 24 hour(s)).

## 2022-04-05 NOTE — PROGRESS NOTES
Impression and Plan     Impression:   1. Acute new-onset congestive heart failure with reduced left ventricular ejection fraction of 25-30%, unclear cause at this time. He does not appear significantly fluid-overloaded.  2. Elevated troponin, likely demand-mediated from heart failure and anemia although this could suggest underlying obstructive coronary artery disease.  3. Acute on likely chronic microcytic iron deficiency anemia with left axillary hematoma. No obvious source of blood loss seen on endoscopic evaluation.  4. Severe coronary artery calcification on chest CT. He denies anginal symptoms.  5. Essential hypertension. A bit high at times.  6. Dyslipidemia. Last LDL 45.  7. Non insulin-dependent diabetes mellitus type 2.  8. Falls and memory issues.    Plan:    Continue losartan 25 mg and spironolactone 25 mg daily.    Continue metoprolol succinate 25 mg daily.    If his blood pressure remains controlled, as an outpatient we may be able to discontinue hydralazine to allow uptitration of his heart failure medication    Aspirin 81 mg daily    Although his LDL is low, given his coronary artery calcification and diabetes mellitus, rosuvastatin 5 mg daily was added    Continue oral furosemide 20 mg daily    Defer on invasive coronary angiography at this time, as he is not having an acute coronary syndrome or anginal symptoms. I would like to see completion of his gastrointestinal workup first with stabilization of his hemoglobin, as well as reassessing his ability to take dual antiplatelet therapy if needed given his fall and memory issues    We will arrange for him to see us in the Heart Failure [facility] clinic in 2-4 weeks and with me in 2 months    We will sign off at this time. Please do not hesitate to contact us with additional questions or concerns.    Primary Cardiologist: can establish with me    Subjective     - still feels weak and out of breath, but better today  - awaiting TCU placement  "    Cardiac Diagnostics     ECG 4/2/2022 (personally reviewed and interpreted): SR with PVCs and PACs, possible inferior infarct     Echocardiogram 4/2/2022 (results reviewed):   Left ventricular function is decreased. The ejection fraction is 25-30%  (severely reduced).  There is mod-severe global hypokinesia of the left ventricle.  There is inferior & inferolateral wall akinesis.  Right ventrical function, chamber size, wall motion, and thickness are normal.  The left atrium is mildly dilated.  The right atrium is mildly dilated.  The right ventricular systolic pressure is approximated at 47mmHg plus the  right atrial pressure.  IVC diameter >2.1 cm collapsing <50% with sniff suggests a high RA pressure  estimated at 15 mmHg or greater.    Physical Examination       BP (!) 146/68 (BP Location: Right arm)   Pulse 65   Temp 98.6  F (37  C) (Oral)   Resp 20   Ht 1.854 m (6' 1\")   Wt 84.1 kg (185 lb 4.8 oz)   SpO2 90%   BMI 24.45 kg/m          Wt Readings from Last 3 Encounters:   04/04/22 84.1 kg (185 lb 4.8 oz)   02/12/19 109.8 kg (242 lb)   12/11/18 109.8 kg (242 lb)     Intake/Output Summary (Last 24 hours) at 4/5/2022 1332  Last data filed at 4/5/2022 1244  Gross per 24 hour   Intake 1436 ml   Output --   Net 1436 ml       General: pleasant male. No acute distress.   HENT: external ears normal. Nares patent. Mucous membranes moist.  Eyes: perrla, extraocular muscles intact. No scleral icterus.   Neck: No JVD  Lungs: clear to auscultation  COR:  regular rate and rhythm, No murmurs, rubs, or gallops  Abd: nondistended, BS present  Extrem: No edema         Imaging      CTA chest 4/1/2022 (report reviewed):  1.  No pulmonary embolism demonstrated.  2.  Small bilateral pleural effusions and associated probable compressive atelectasis versus less likely infiltrate.  3.  At least moderate emphysema.  4.  Moderate hiatal hernia.  5.  Severe coronary artery calcification.     Left upper extremity ultrasound " 4/2/2022 (report reviewed):  1.  No deep venous thrombosis in the left upper extremity.  2.  Probable 6.1 cm left axillary hematoma.    Lab Results   Lab Results   Component Value Date     04/05/2022    CO2 23 04/05/2022    BUN 11 04/05/2022     Lab Results   Component Value Date    WBC 7.7 04/05/2022    HGB 8.7 04/05/2022    HCT 30.6 04/05/2022    MCV 77 04/05/2022     04/05/2022     Lab Results   Component Value Date    CHOL 112 03/17/2021    TRIG 101 03/17/2021    HDL 45 03/17/2021     Lab Results   Component Value Date    INR 1.24 04/01/2022     Lab Results   Component Value Date     04/01/2022     Lab Results   Component Value Date    TROPONINI 1.05 04/03/2022    TROPONINI 0.95 04/02/2022    TROPONINI 0.69 04/02/2022     Lab Results   Component Value Date    TSH 6.98 04/01/2022           Current Inpatient Scheduled Medications   Scheduled Meds:    aspirin  81 mg Oral Daily     - MEDICATION INSTRUCTIONS -   Does not apply See Admin Instructions     furosemide  20 mg Oral Daily     hydrALAZINE  25 mg Oral Q8H STEPHANE     insulin aspart  1-3 Units Subcutaneous TID AC     losartan  25 mg Oral Daily     metoprolol succinate ER  25 mg Oral Daily     pantoprazole  40 mg Oral BID AC     rosuvastatin  5 mg Oral QPM     senna-docusate  1 tablet Oral BID     sodium chloride (PF)  3 mL Intracatheter Q8H     spironolactone  25 mg Oral Daily            Medications Prior to Admission   Prior to Admission medications    Medication Sig Start Date End Date Taking? Authorizing Provider   acetaminophen (TYLENOL) 325 MG tablet Take 2 tablets (650 mg) by mouth every 4 hours as needed for mild pain or fever 4/5/22  Yes Navin Mack MD   albuterol (PROVENTIL) (2.5 MG/3ML) 0.083% neb solution Take 1 vial (2.5 mg) by nebulization every 6 hours as needed for wheezing 4/5/22  Yes Navin Mack MD   aspirin (ASA) 81 MG EC tablet Take 1 tablet (81 mg) by mouth daily 4/6/22  Yes Navin Mack  MD   docusate sodium (COLACE) 100 MG capsule Take 100 mg by mouth daily   Yes Unknown, Entered By History   furosemide (LASIX) 20 MG tablet Take 1 tablet (20 mg) by mouth daily 4/6/22  Yes Navin Mack MD   glucosamine-chondroitin 500-400 MG CAPS per capsule Take 3 capsules by mouth daily   Yes Unknown, Entered By History   losartan (COZAAR) 25 MG tablet Take 1 tablet (25 mg) by mouth daily 4/6/22  Yes Navin Mack MD   metoprolol succinate ER (TOPROL-XL) 25 MG 24 hr tablet Take 1 tablet (25 mg) by mouth daily 4/6/22  Yes Navin Mack MD   multivitamin therapeutic tablet [MULTIVITAMIN THERAPEUTIC TABLET] Take 1 tablet by mouth daily. 1/21/18  Yes Provider, Historical   Omega-3 Fatty Acids (FISH OIL) 1200 MG capsule Take 1,200 mg by mouth daily   Yes Unknown, Entered By History   pantoprazole (PROTONIX) 40 MG EC tablet Take 1 tablet (40 mg) by mouth 2 times daily 4/5/22  Yes Navin Mack MD   rosuvastatin (CRESTOR) 5 MG tablet Take 1 tablet (5 mg) by mouth every evening 4/5/22  Yes Navin Mack MD   senna-docusate (SENOKOT-S/PERICOLACE) 8.6-50 MG tablet Take 1 tablet by mouth 2 times daily 4/5/22  Yes Navin Mack MD   spironolactone (ALDACTONE) 25 MG tablet Take 1 tablet (25 mg) by mouth daily 4/6/22  Yes Navin Mack MD   vitamin D3 (CHOLECALCIFEROL) 50 mcg (2000 units) tablet Take 1 tablet (50 mcg) by mouth daily 4/5/22  Yes Navin Mack MD Brent E. White, MD [facility]  Non-Invasive Cardiologist  [facility]  Pager [phone]   none known

## 2022-04-05 NOTE — PROGRESS NOTES
"[facility] Inpatient Progress Note    Patient: Solitario Benjamin  MRN: [id number]  Date of Service: April 5, 2022         Reason for Visit    Follow-up for iron deficiency anemia    Assessment and Plan    Iron deficiency anemia  Left rotator cuff tear with hematoma  Acute new onset congestive heart failure  30 pound weight loss      No evidence of GI bleeding on endoscopies.  Has received IV Venofer.  We will check labs for celiac disease and await results of any biopsies done on endoscopy.    PSA ordered.    Okay to discharge from my perspective.  Follow-up in clinic in a month to assess response to iron and need for additional parenteral iron therapy.  Would start oral iron once daily on discharge as well.    We will sign off.    Plan: Labs as above  Start oral iron once daily  Follow-up in clinic in a month      ECOG Performance Status: 2        ______________________________________________________________________________    History of Present Illness    Mr. Solitario Benjamin reports some shortness of breath but no pain.  Cannot remember having endoscopy procedures yesterday.    Review of Systems    As per the HPI.    Physical Exam    BP (!) 158/76   Pulse 86   Temp 97.7  F (36.5  C) (Oral)   Resp 20   Ht 1.854 m (6' 1\")   Wt 84.1 kg (185 lb 4.8 oz)   SpO2 91%   BMI 24.45 kg/m        GENERAL: Alert and oriented to time place and person. Seated comfortably. In no distress.    HEAD: Atraumatic and normocephalic.    EYES: JONAH, EOMI.  No pallor.  No icterus.    Oral cavity: no mucosal lesion or tonsillar enlargement.    NECK: supple. JVP normal.  No thyroid enlargement.    LYMPH NODES: No palpable, cervical, axillary or inguinal lymphadenopathy.    CHEST: clear to auscultation bilaterally.  Resonant to percussion throughout bilaterally.  Symmetrical breath movements bilaterally.    CVS: S1 and S2 are heard. Regular rate and rhythm.  No murmur or gallop or rub heard.  No peripheral " edema.    ABDOMEN: Soft. Not tender. Not distended.  No palpable hepatomegaly or splenomegaly.  No other mass palpable.  Bowel sounds heard.    EXTREMITIES: Warm.    SKIN: no rash, or bruising or purpura.  Has a full head of hair.      Lab Results    Recent Results (from the past 24 hour(s))   Glucose by meter    Collection Time: 04/04/22  7:50 AM   Result Value Ref Range    GLUCOSE BY METER POCT 113 (H) 70 - 99 mg/dL   Glucose by meter    Collection Time: 04/04/22  9:57 AM   Result Value Ref Range    GLUCOSE BY METER POCT 116 (H) 70 - 99 mg/dL   UPPER GI ENDOSCOPY    Collection Time: 04/04/22 10:05 AM   Result Value Ref Range    Upper GI Endoscopy       [facility]  [address] [place] [address]  _______________________________________________________________________________  Patient Name: Solitario Benjamin            Procedure Date: 4/4/2022 10:05 AM  MRN: [id number]                       Account Number: [id number]  YOB: 1935              Admit Type: Inpatient  Age: 86                               Room: [place] [address]  Note Status: Finalized                Attending MD: KERRIE BURGOS MD  Instrument Name: D Scope 1129         _______________________________________________________________________________     Procedure:             Upper GI endoscopy  Indications:           Iron deficiency anemia  Providers:             KERRIE BURGOS MD  Referring MD:            Medicines:             Monitored Anesthesia Care  Complications:         No immediate complications. Estimated blood loss:                          Minimal.  _______________________________________________________________________________  P rocedure:             Pre-Anesthesia Assessment:                         - Prior to the procedure, a History and Physical was                          performed, and patient medications and allergies were                          reviewed. The patient is competent. The risks and                           benefits of the procedure and the sedation options and                          risks were discussed with the patient. All questions                          were answered and informed consent was obtained.                          Patient identification and proposed procedure were                          verified by the physician, the nurse and the                          anesthetist in the pre-procedure area in the procedure                          room. Mental Status Examination: alert and oriented.                          Airway Examination: normal oropharyngeal airway and                          neck mobility. Respiratory Examination: clear to                          ausculta tion. CV Examination: normal. Prophylactic                          Antibiotics: The patient does not require prophylactic                          antibiotics. Prior Anticoagulants: The patient has                          taken no anticoagulant or antiplatelet agents. ASA                          Grade Assessment: III - A patient with severe systemic                          disease. After reviewing the risks and benefits, the                          patient was deemed in satisfactory condition to                          undergo the procedure. The anesthesia plan was to use                          monitored anesthesia care (MAC). Immediately prior to                          administration of medications, the patient was                          re-assessed for adequacy to receive sedatives. The                          heart rate, respiratory rate, oxygen saturations,                          blood pressure, adequacy of pulmonary ventilation, and                          response  to care were monitored throughout the                          procedure. The physical status of the patient was                          re-assessed after the procedure.                         After obtaining informed consent, the  endoscope was                          passed under direct vision. Throughout the procedure,                          the patient's blood pressure, pulse, and oxygen                          saturations were monitored continuously. The endoscope                          was introduced through the mouth, and advanced to the                          third part of duodenum. The upper GI endoscopy was                          accomplished without difficulty. The patient tolerated                          the procedure well.                                                                                   Findings:       LA Grade B (one or more mucosal breaks greater than 5 mm, not extending        between the tops of two mucosal folds) esophagitis with no blee ding was        found. Biopsies were taken with a cold forceps for histology.       A large hiatal hernia was present.       Nonbleeding mild erosive gastritis. Gastric biopsies obtained.       The examined duodenum was normal. Biopsies were taken with a cold        forceps for histology.                                                                                   Moderate Sedation:       Moderate (conscious) sedation was personally administered by an        anesthesia professional. The following parameters were monitored: oxygen        saturation, heart rate, blood pressure, and response to care.  Impression:            - LA Grade B esophagitis with no bleeding. Biopsied.                         - Large hiatal hernia.                         - Mild erosive gastritits. Gastric biopsies obtained.                         - Normal examined duodenum. Biopsied.  Recommendation:        - Await pathology results.                         - Treat esophagiits/gastritis with PPI                          - Proceed with scheduled colonoscopy                                                                                     Basilia Jones MD  ________________  BASILIA  MD AUBREY  4/4/2022 10:42:56 AM  I was physically present for the entire viewing portion of the exam.  __________________________  Signature of teaching physician  Ulisses/Viviane BURGOS MD  Number of Addenda: 0    Note Initiated On: 4/4/2022 10:05 AM  Scope In: 10:30:23 AM  Scope Out: 10:36:18 AM     COLONOSCOPY    Collection Time: 04/04/22 10:45 AM   Result Value Ref Range    COLONOSCOPY       [facility]  [address], [place] [address]  _______________________________________________________________________________  Patient Name: Solitario Benjamin            Procedure Date: 4/4/2022 10:45 AM  MRN: [id number]                       Account Number: [id number]  YOB: 1935              Admit Type: Inpatient  Age: 86                               Room: [place] [address]  Note Status: Addendum                 Attending MD: KERRIE BURGOS MD  Instrument Name: Adult Colonoscope [id number]   _______________________________________________________________________________     Procedure:             Colonoscopy  Indications:           Iron deficiency anemia  Providers:             KERRIE BURGOS MD  Referring MD:            Medicines:             Monitored Anesthesia Care  Complications:         No immediate complications. Estimated blood loss: None.  _______________________________________________________________________________  Procedure:             Pre-Anesthe alisson Assessment:                         - Prior to the procedure, a History and Physical was                          performed, and patient medications and allergies were                          reviewed. The patient is competent. The risks and                          benefits of the procedure and the sedation options and                          risks were discussed with the patient. All questions                          were answered and informed consent was obtained.                          Patient identification and proposed procedure  were                          verified by the physician, the nurse and the                          anesthetist in the pre-procedure area in the procedure                          room. Mental Status Examination: alert and oriented.                          Airway Examination: normal oropharyngeal airway and                          neck mobility. Respiratory Examination: clear to                          auscultation. CV Examination: normal. Pro phylactic                          Antibiotics: The patient does not require prophylactic                          antibiotics. Prior Anticoagulants: The patient has                          taken no anticoagulant or antiplatelet agents. ASA                          Grade Assessment: III - A patient with severe systemic                          disease. After reviewing the risks and benefits, the                          patient was deemed in satisfactory condition to                          undergo the procedure. The anesthesia plan was to use                          monitored anesthesia care (MAC). Immediately prior to                          administration of medications, the patient was                          re-assessed for adequacy to receive sedatives. The                          heart rate, respiratory rate, oxygen saturations,                          blood pressure, adequacy of pulmonary ventilation, and                          response to care were monitored throughou t the                          procedure. The physical status of the patient was                          re-assessed after the procedure.                         - Prior to the procedure, a History and Physical was                          performed, and patient medications and allergies were                          reviewed. The patient is competent. The risks and                          benefits of the procedure and the sedation options and                          risks were discussed  with the patient. All questions                          were answered and informed consent was obtained.                          Patient identification and proposed procedure were                          verified by the physician, the nurse and the                          anesthetist in the pre-procedure area in the procedure                          room. Mental Status Examination: alert and oriented.                          Airway Examination: normal oropharyngeal airway and                           neck mobility. Respiratory Examination: clear to                          auscultation. CV Examination: normal. ASA Grade                          Assessment: III - A patient with severe systemic                          disease. After reviewing the risks and benefits, the                          patient was deemed in satisfactory condition to                          undergo the procedure. The anesthesia plan was to use                          monitored anesthesia care (MAC). Immediately prior to                          administration of medications, the patient was                          re-assessed for adequacy to receive sedatives. The                          heart rate, respiratory rate, oxygen saturations,                          blood pressure, adequacy of pulmonary ventilation, and                          response to care were monitored throughout the                          procedure. The physical status of the patient was                          re-assessed  after the procedure.                         After obtaining informed consent, the colonoscope was                          passed under direct vision. Throughout the procedure,                          the patient's blood pressure, pulse, and oxygen                          saturations were monitored continuously. The                          colonoscope was introduced through the anus and                          advanced to the cecum,  identified by appendiceal                          orifice and ileocecal valve. The colonoscopy was                          performed without difficulty. The patient tolerated                          the procedure well. The quality of the bowel                          preparation was fair.                                                                                   Findings:       The perianal and digital rectal examinations were normal.       The colon (entire examined portion) appeared normal within limitations        of the prep.       Fair  prep despite copious lavage/suction; small or flat polyps could        have been missed due to prep quality.                                                                                   Moderate Sedation:       Moderate (conscious) sedation was personally administered by an        anesthesia professional. The following parameters were monitored: oxygen        saturation, heart rate, blood pressure, and response to care.  Impression:            - Preparation of the colon was fair.                         - The entire examined colon is normal within                          limitations of the prep.                         - No specimens collected.                         Cause of anemia not identifie don this exam.  Recommendation:        - [facility] patient to [facility] for [facility] care.                         - Resume diet.                         - Outpatient small bowel pillcam                                                                                     Basilia Burgos MD   ________________  BASILIA BURGOS MD  4/4/2022 11:09:40 AM  I was physically present for the entire viewing portion of the exam.  __________________________  Signature of teaching physician  B4c/Viviane BURGOS MD  Number of Addenda: 1    Note Initiated On: 4/4/2022 10:45 AM  Scope In: 10:47:56 AM  Scope Out: 10:58:00  AM  _______________________________________________________________________________    Addendum Number: 1   Addendum Date: 4/4/2022 4:11:20 PM           Following discussion with hospitalist, in light of known large hematoma,        will hold off on ordering small bowel pillcam for further gi workup.        This can be ordered as outpatient if further gi eval for anemia is felt        to be indicated.        Basilia Burgos MD  ________________  BASILIA BURGOS MD  4/4/2022 4:16:08 PM  I was physically present for the entire viewing portion of the exam.  __________________________  Signature of teaching physician  B4c/Viviane BURGOS MD     Glucose by meter    Collection Time: 04/04/22 12:55 PM   Result Value Ref Range    GLUCOSE BY METER POCT 113 (H) 70 - 99 mg/dL   Glucose by meter    Collection Time: 04/04/22  5:14 PM   Result Value Ref Range    GLUCOSE BY METER POCT 170 (H) 70 - 99 mg/dL   Glucose by meter    Collection Time: 04/04/22  9:51 PM   Result Value Ref Range    GLUCOSE BY METER POCT 121 (H) 70 - 99 mg/dL   CBC with platelets    Collection Time: 04/05/22  5:18 AM   Result Value Ref Range    WBC Count 7.7 4.0 - 11.0 10e3/uL    RBC Count 3.97 (L) 4.40 - 5.90 10e6/uL    Hemoglobin 8.7 (L) 13.3 - 17.7 g/dL    Hematocrit 30.6 (L) 40.0 - 53.0 %    MCV 77 (L) 78 - 100 fL    MCH 21.9 (L) 26.5 - 33.0 pg    MCHC 28.4 (L) 31.5 - 36.5 g/dL    RDW 27.3 (H) 10.0 - 15.0 %    Platelet Count 214 150 - 450 10e3/uL   Basic metabolic panel    Collection Time: 04/05/22  5:18 AM   Result Value Ref Range    Sodium 139 136 - 145 mmol/L    Potassium 3.8 3.5 - 5.0 mmol/L    Chloride 106 98 - 107 mmol/L    Carbon Dioxide (CO2) 23 22 - 31 mmol/L    Anion Gap 10 5 - 18 mmol/L    Urea Nitrogen 11 8 - 28 mg/dL    Creatinine 1.13 0.70 - 1.30 mg/dL    Calcium 8.8 8.5 - 10.5 mg/dL    Glucose 107 70 - 125 mg/dL    GFR Estimate 64 >60 mL/min/1.73m2        Imaging    Echocardiogram Complete    Result Date: 4/2/2022  [id number]  [id number]^HOMER^KENYON^[place] [address]  Name: AUGUSTIN GHOSH MRN: [id number] : 1935 Study Date: 2022 10:05 AM Age: 86 yrs Gender: Male Patient Location: [facility] Reason For Study: SOB Ordering Physician: KENYON CORONEL Performed By: JESUS  BSA: 2.1 m2 Height: 73 in Weight: 194 lb HR: 77 BP: 150/64 mmHg ______________________________________________________________________________ Procedure Definity (NDC #70243-854) given intravenously. Lot [id number], Exp . ______________________________________________________________________________ Interpretation Summary  Left ventricular function is decreased. The ejection fraction is 25-30% (severely reduced). There is mod-severe global hypokinesia of the left ventricle. There is inferior & inferolateral wall akinesis. Right ventrical function, chamber size, wall motion, and thickness are normal. The left atrium is mildly dilated. The right atrium is mildly dilated. The right ventricular systolic pressure is approximated at 47mmHg plus the right atrial pressure. IVC diameter >2.1 cm collapsing <50% with sniff suggests a high RA pressure estimated at 15 mmHg or greater. ______________________________________________________________________________ Left Ventricle The left ventricle is moderately dilated. Left ventricular function is decreased. The ejection fraction is 25-30% (severely reduced). There is mild eccentric left ventricular hypertrophy. Left ventricular diastolic function is abnormal. There is mod-severe global hypokinesia of the left ventricle. There is inferior & inferolateral wall akinesis.  Right Ventricle Right ventrical function, chamber size, wall motion, and thickness are normal.  Atria The left atrium is mildly dilated. The right atrium is mildly dilated. There is no color Doppler evidence of an atrial shunt.  Mitral Valve Mitral valve leaflets appear normal. There is mild to moderate (1-2+)  mitral regurgitation.  Tricuspid Valve Tricuspid valve leaflets appear normal. There is mild (1+) tricuspid regurgitation. The right ventricular systolic pressure is approximated at 47mmHg plus the right atrial pressure. IVC diameter >2.1 cm collapsing <50% with sniff suggests a high RA pressure estimated at 15 mmHg or greater.  Aortic Valve Moderate aortic valve calcification is present. There is mild (1+) aortic regurgitation. No aortic stenosis is present.  Pulmonic Valve The pulmonic valve is not well seen, but is grossly normal.  Vessels Normal size ascending aorta.  Pericardium There is no pericardial effusion.  ______________________________________________________________________________ MMode/2D Measurements & Calculations IVSd: 1.3 cm LVIDd: 6.3 cm LVIDs: 4.8 cm LVPWd: 1.2 cm FS: 24.7 %  LV mass(C)d: 344.1 grams LV mass(C)dI: 162.0 grams/m2 Ao root diam: 3.3 cm LA dimension: 4.4 cm asc Aorta Diam: 3.8 cm LA/Ao: 1.4 LVOT diam: 2.5 cm LVOT area: 5.1 cm2 LA Volume Indexed (AL/bp): 30.9 ml/m2 RWT: 0.37  Time Measurements MM HR: 77.0 BPM  Doppler Measurements & Calculations MV E max jono: 103.0 cm/sec MV A max jono: 91.7 cm/sec MV E/A: 1.1 MV dec time: 0.25 sec Ao V2 max: 165.9 cm/sec Ao max P.0 mmHg Ao V2 mean: 119.5 cm/sec Ao mean P.6 mmHg Ao V2 VTI: 37.0 cm CASEY(I,D): 2.9 cm2 CASEY(V,D): 3.2 cm2 LV V1 max P.3 mmHg LV V1 max: 103.8 cm/sec LV V1 VTI: 21.3 cm SV(LVOT): 107.6 ml SI(LVOT): 50.7 ml/m2 PA acc time: 0.12 sec TR max jono: 342.4 cm/sec TR max P.9 mmHg AV Jono Ratio (DI): 0.63 CASEY Index (cm2/m2): 1.4 E/E' av.4 Lateral E/e': 8.3 Medial E/e': 18.6  ______________________________________________________________________________ Report approved by: Arianna Reed 2022 12:21 PM          Signed by: Mario Leon MD

## 2022-04-05 NOTE — PROGRESS NOTES
Care Management Follow Up    Length of Stay (days): 4    Expected Discharge Date: 04/06/2022     Concerns to be Addressed:     Discharge planning  Patient plan of care discussed at interdisciplinary rounds: Yes    Anticipated Discharge Disposition: Transitional Care     Anticipated Discharge Services:  Nursing PT and OT  Anticipated Discharge DME: [facility]    Patient/family educated on Medicare website which has current facility and service quality ratings: yes  Education Provided on the Discharge Plan:  yes  Patient/Family in Agreement with the Plan: yes    Referrals Placed by CM/SW:  Post acute facilities  Private pay costs discussed: Not applicable    Additional Information:  Chart reviewed. SW met with patient and his daughter in patient's room to review discharge plan. Discussed TCU referrals pending, pt is agreeable to discharge plan for TCU.   CM will continue to update pt and family when bed is available and coordinate with medical team when pt is ready for discharge.  Julius Gaspar is reviewing.    MADELYN Mukherjee

## 2022-04-05 NOTE — PLAN OF CARE
Goal Outcome Evaluation:    Pt alert and oriented.  Pt reports having discomfort and soreness on his left arm. Ice applied and elevated on pillow.   No bloody stools overnight.

## 2022-04-05 NOTE — DISCHARGE SUMMARY
[facility]    Discharge Summary  Hospitalist    Date of Admission:  4/1/2022  Date of Discharge:  4/6/2022  Discharging Provider: Navin Leonardo MD    Discharge Diagnoses   Principal Problem:    Probable UGI bleed, suspect from Esophagitis or Gastritis      GERD with Esophagitis, with large Hiatal Hernia      Gastritis       Acute blood loss anemia      Accelerated hypertension      Acute on chronic systolic heart failure       NSTEMI -- demand ischemia      Iron deficiency anemia      Ecchymosis of forearm    Active Problems:    DM type 2, Hgb A1C 5.7 on 4/1/22      Prostate cancer       Moderate malnutrition       History of Present Illness   86 year old male with COPD, prostate cancer, htn, dm who has been off of all of his meds for at least 6 weeks and comes in here with sob, severe anemia, 30 pound wt loss, and extensive bruising but has been falling.  He does take Ibuprofen, but has not notice any blood in stool or melena.     In ER, , /62, Hgb 4.6 with MCV of 63, Iron 18, TIBC 378, 5% Sat, TSH 6.98, Free T4 0.82, trop 0.13     Hospital Course   Admitted to medicine floor, treated with IV Protonix and transfused blood and IV Venofer, and had EGD which showed a large hiatal hernia and esophagitis and gastritis -- but no active bleeding seen, and then subsequent colonoscopy with bleeding seen.      Was seen by cardiology who advised aspirin EC 81 mg daily, and stop Amlodipine and treated hypertension with metoprolol and hydralazine.  Trop did rise to 1.05, thought to be demand ischemia related to severe anemia.     Left ventricular function is decreased. The ejection fraction is 25-30%  (severely reduced).  There is mod-severe global hypokinesia of the left ventricle.  There is inferior & inferolateral wall akinesis.  Right ventrical function, chamber size, wall motion, and thickness are normal.  The left atrium is mildly dilated.  The right atrium is mildly  dilated.  The right ventricular systolic pressure is approximated at 47mmHg plus the  right atrial pressure.  IVC diameter >2.1 cm collapsing <50% with sniff suggests a high RA pressure  estimated at 15 mmHg or greater.    Was seen by Heme-Onc, PSA < 0.10. Advised iron replacement and then reassess.  [facility] did not suggest a PillCam at this time to check small bowel, but could always do in future if ongoing blood loss.     At time of discharge hgb Stable at 8.5.  He will discharge to [facility]CU in [place] because of weakness and deconditioning.     Navin Leonardo MD  Pager: [phone]  Cell Phone:  [phone]       Significant Results and Procedures   As above    Pending Results   These results will be followed up by Dr. Leonardo  Unresulted Labs Ordered in the Past 30 Days of this Admission     Date and Time Order Name Status Description    4/5/2022  7:00 AM Celiac (Gluten) Antibody Panel In process     4/1/2022  7:36 PM Vitamin C In process     4/1/2022  7:30 PM Prepare red blood cells (unit) Preliminary     4/1/2022  7:30 PM Prepare red blood cells (unit) Preliminary           Code Status   DNR / DNI       Primary Care Physician   Yogi Saldana MD    Physical Exam   Temp: 97.6  F (36.4  C) Temp src: Axillary BP: (!) 155/67 Pulse: 80   Resp: 18 SpO2: 92 % O2 Device: None (Room air)    Vitals:    04/03/22 0406 04/04/22 0342 04/06/22 0510   Weight: 86.1 kg (189 lb 12.8 oz) 84.1 kg (185 lb 4.8 oz) 84.3 kg (185 lb 14.4 oz)     Vital Signs with Ranges  Temp:  [97.6  F (36.4  C)-98.4  F (36.9  C)] 97.6  F (36.4  C)  Pulse:  [64-80] 80  Resp:  [18-20] 18  BP: (111-155)/(56-76) 155/67  SpO2:  [91 %-93 %] 92 %  I/O last 3 completed shifts:  In: 1136 [P.O.:1136]  Out: 2850 [Urine:2850]    Exam on discharge:   Lungs clear  CV reg S1S2  Abd non-tender    Discharge Disposition   Discharged to short-term care facility  Condition at discharge: Stable    Consultations This Hospital Stay   HEMATOLOGY & ONCOLOGY IP  CONSULT  GASTROENTEROLOGY IP CONSULT  ORTHOPEDIC SURGERY IP CONSULT  CARDIOLOGY IP CONSULT  OCCUPATIONAL THERAPY ADULT IP CONSULT  PHYSICAL THERAPY ADULT IP CONSULT  SOCIAL WORK IP CONSULT  PHYSICAL THERAPY ADULT IP CONSULT  OCCUPATIONAL THERAPY ADULT IP CONSULT    Time Spent on this Encounter   I spent a total of 35 minutes discharging this patient.     Discharge Orders      Primary Care - Care Coordination Referral      General info for SNF    Length of Stay Estimate: Short Term Care: Estimated # of Days <30  Condition at Discharge: Improving  Level of care:skilled   Rehabilitation Potential: Good  Admission H&P remains valid and up-to-date: Yes  Recent Chemotherapy: N/A  Use Nursing Home Standing Orders: Yes     Mantoux instructions    Give two-step Mantoux (PPD) Per Facility Policy Yes     Reason for your hospital stay    Severe anemia, suspect gastrointestinal bleeding, possibly from Esophagitis or gastritis     Activity - Up ad carina     Additional Discharge Instructions    Call Dr. Mack if any medical questions at Cell Phone [phone].     Follow Up and recommended labs and tests    Follow up with Nursing home provider in 5 days, check CBC and BMP then.     No CPR- Pre-arrest intubation OK     Physical Therapy Adult Consult    Evaluate and treat as clinically indicated.    Reason:  weakness     Occupational Therapy Adult Consult    Evaluate and treat as clinically indicated.    Reason:  Assist with ADL's     Diet    Follow this diet upon discharge:       Combination Diet 3 gm NA Diet     Discharge Medications   Current Discharge Medication List      START taking these medications    Details   acetaminophen (TYLENOL) 325 MG tablet Take 2 tablets (650 mg) by mouth every 4 hours as needed for mild pain or fever  Refills: 0    Associated Diagnoses: Pain      albuterol (PROVENTIL) (2.5 MG/3ML) 0.083% neb solution Take 1 vial (2.5 mg) by nebulization every 6 hours as needed for wheezing  Qty: 90 mL, Refills: 0     Associated Diagnoses: Centrilobular emphysema (H)      aspirin (ASA) 81 MG EC tablet Take 1 tablet (81 mg) by mouth daily  Refills: 0    Associated Diagnoses: Elevated troponin level not due to acute coronary syndrome      furosemide (LASIX) 20 MG tablet Take 1 tablet (20 mg) by mouth daily  Refills: 0    Associated Diagnoses: Acute on chronic systolic heart failure (H)      losartan (COZAAR) 25 MG tablet Take 1 tablet (25 mg) by mouth daily  Refills: 0    Associated Diagnoses: Acute on chronic systolic heart failure (H)      metoprolol succinate ER (TOPROL-XL) 25 MG 24 hr tablet Take 1 tablet (25 mg) by mouth daily  Refills: 0    Associated Diagnoses: Elevated troponin level not due to acute coronary syndrome      pantoprazole (PROTONIX) 40 MG EC tablet Take 1 tablet (40 mg) by mouth 2 times daily  Refills: 0    Comments: For 1 month, then 40 mg daily -- for GERD with Esophagitis.  Associated Diagnoses: Gastroesophageal reflux disease with esophagitis and hemorrhage      rosuvastatin (CRESTOR) 5 MG tablet Take 1 tablet (5 mg) by mouth every evening  Refills: 0    Associated Diagnoses: Elevated troponin level not due to acute coronary syndrome      senna-docusate (SENOKOT-S/PERICOLACE) 8.6-50 MG tablet Take 1 tablet by mouth 2 times daily  Refills: 0    Associated Diagnoses: Constipation, unspecified constipation type      spironolactone (ALDACTONE) 25 MG tablet Take 1 tablet (25 mg) by mouth daily  Refills: 0    Associated Diagnoses: Acute on chronic systolic heart failure (H)      vitamin D3 (CHOLECALCIFEROL) 50 mcg (2000 units) tablet Take 1 tablet (50 mcg) by mouth daily  Refills: 0    Associated Diagnoses: Pain         CONTINUE these medications which have NOT CHANGED    Details   docusate sodium (COLACE) 100 MG capsule Take 100 mg by mouth daily      glucosamine-chondroitin 500-400 MG CAPS per capsule Take 3 capsules by mouth daily      multivitamin therapeutic tablet [MULTIVITAMIN THERAPEUTIC TABLET] Take  1 tablet by mouth daily.      Omega-3 Fatty Acids (FISH OIL) 1200 MG capsule Take 1,200 mg by mouth daily         STOP taking these medications       albuterol (PROAIR HFA;PROVENTIL HFA;VENTOLIN HFA) 90 mcg/actuation inhaler Comments:   Reason for Stopping:         calcium carbonate 600 mg-vitamin D 400 units (CALTRATE) 600-400 MG-UNIT per tablet Comments:   Reason for Stopping:         glucosamine sulfate 500 mg cap Comments:   Reason for Stopping:         ibuprofen (ADVIL/MOTRIN) 200 MG tablet Comments:   Reason for Stopping:             Allergies   No Known Allergies  Data   Most Recent 3 CBC's:  Recent Labs   Lab Test 04/06/22  0450 04/05/22  0518 04/04/22  0527 04/03/22  1841 04/03/22  0543   WBC  --  7.7 7.4  --  6.7   HGB 8.5* 8.7* 9.0*  9.0*   < > 8.3*  8.3*   MCV  --  77* 72*  --  71*   PLT  --  214 204  --  182    < > = values in this interval not displayed.      Most Recent 3 BMP's:  Recent Labs   Lab Test 04/06/22  1143 04/05/22  2103 04/05/22  1703 04/05/22  0826 04/05/22  0518 04/04/22  0750 04/04/22  0527 04/03/22  0751 04/03/22  0543   NA  --   --   --   --  139  --  137  --  140   POTASSIUM  --   --   --   --  3.8  --  3.3*  --  3.5   CHLORIDE  --   --   --   --  106  --  104  --  104   CO2  --   --   --   --  23  --  25  --  23   BUN  --   --   --   --  11  --  10  --  17   CR  --   --   --   --  1.13  --  1.00  --  1.11   ANIONGAP  --   --   --   --  10  --  8  --  13   GUILLE  --   --   --   --  8.8  --  8.9  --  8.7   * 203* 98   < > 107   < > 103   < > 118    < > = values in this interval not displayed.     Most Recent 2 LFT's:  Recent Labs   Lab Test 04/02/22  0521 04/01/22  1838   AST 24 25   ALT 16 13   ALKPHOS 69 62   BILITOTAL 1.4* 1.0     Most Recent INR's and Anticoagulation Dosing History:  Anticoagulation Dose History     Recent Dosing and Labs Latest Ref Rng & Units 4/1/2022    INR 0.90 - 1.15 1.24(H)        Most Recent 3 Troponin's:No lab results found.  Most Recent Cholesterol  Panel:  Recent Labs   Lab Test 03/17/21  1049   CHOL 112   LDL 47   HDL 45   TRIG 101     Most Recent 6 Bacteria Isolates From Any Culture (See EPIC Reports for Culture Details):No lab results found.  Most Recent TSH, T4 and A1c Labs:  Recent Labs   Lab Test 04/02/22  0521 04/01/22  1838   TSH  --  6.98*   T4 0.82  --    A1C  --  5.7*

## 2022-04-06 ENCOUNTER — APPOINTMENT (OUTPATIENT)
Dept: OCCUPATIONAL THERAPY | Facility: HOSPITAL | Age: 87
DRG: 368 | End: 2022-04-06
Payer: COMMERCIAL

## 2022-04-06 VITALS
TEMPERATURE: 97.6 F | OXYGEN SATURATION: 92 % | WEIGHT: 185.9 LBS | BODY MASS INDEX: 24.64 KG/M2 | HEIGHT: 73 IN | SYSTOLIC BLOOD PRESSURE: 155 MMHG | DIASTOLIC BLOOD PRESSURE: 67 MMHG | HEART RATE: 80 BPM | RESPIRATION RATE: 18 BRPM

## 2022-04-06 LAB
GLUCOSE BLDC GLUCOMTR-MCNC: 103 MG/DL (ref 70–99)
HGB BLD-MCNC: 8.5 G/DL (ref 13.3–17.7)
PSA SERPL-MCNC: <0.1 UG/L (ref 0–6.5)

## 2022-04-06 PROCEDURE — 97110 THERAPEUTIC EXERCISES: CPT | Mod: GO

## 2022-04-06 PROCEDURE — 97535 SELF CARE MNGMENT TRAINING: CPT | Mod: GO

## 2022-04-06 PROCEDURE — 99239 HOSP IP/OBS DSCHRG MGMT >30: CPT | Performed by: INTERNAL MEDICINE

## 2022-04-06 PROCEDURE — 250N000013 HC RX MED GY IP 250 OP 250 PS 637: Performed by: INTERNAL MEDICINE

## 2022-04-06 PROCEDURE — 250N000013 HC RX MED GY IP 250 OP 250 PS 637: Performed by: GENERAL ACUTE CARE HOSPITAL

## 2022-04-06 PROCEDURE — 84153 ASSAY OF PSA TOTAL: CPT | Performed by: INTERNAL MEDICINE

## 2022-04-06 PROCEDURE — 36415 COLL VENOUS BLD VENIPUNCTURE: CPT | Performed by: INTERNAL MEDICINE

## 2022-04-06 PROCEDURE — 85018 HEMOGLOBIN: CPT | Performed by: INTERNAL MEDICINE

## 2022-04-06 RX ADMIN — LOSARTAN POTASSIUM 25 MG: 25 TABLET, FILM COATED ORAL at 08:35

## 2022-04-06 RX ADMIN — HYDRALAZINE HYDROCHLORIDE 25 MG: 25 TABLET, FILM COATED ORAL at 01:02

## 2022-04-06 RX ADMIN — FUROSEMIDE 20 MG: 20 TABLET ORAL at 08:34

## 2022-04-06 RX ADMIN — METOPROLOL SUCCINATE 25 MG: 25 TABLET, EXTENDED RELEASE ORAL at 08:34

## 2022-04-06 RX ADMIN — ACETAMINOPHEN 650 MG: 325 TABLET ORAL at 08:47

## 2022-04-06 RX ADMIN — Medication 40 MG: at 08:51

## 2022-04-06 RX ADMIN — SPIRONOLACTONE 25 MG: 25 TABLET, FILM COATED ORAL at 08:35

## 2022-04-06 RX ADMIN — DOCUSATE SODIUM 50 MG AND SENNOSIDES 8.6 MG 1 TABLET: 8.6; 5 TABLET, FILM COATED ORAL at 08:34

## 2022-04-06 RX ADMIN — ASPIRIN 81 MG: 81 TABLET, COATED ORAL at 08:35

## 2022-04-06 RX ADMIN — HYDRALAZINE HYDROCHLORIDE 25 MG: 25 TABLET, FILM COATED ORAL at 08:34

## 2022-04-06 ASSESSMENT — ACTIVITIES OF DAILY LIVING (ADL)
ADLS_ACUITY_SCORE: 16

## 2022-04-06 NOTE — PLAN OF CARE
Physical Therapy Discharge Summary    Reason for therapy discharge:    Discharged to TCU.    Progress towards therapy goal(s). See goals on Care Plan in [facility] electronic health record for goal details.  Goals partially met.  Barriers to achieving goals:   discharge from facility.    Therapy recommendation(s):    Further recommended therapy is related to documented deficits, and is necessary to maximize functional independence in order for patient to return to prior level of function.      Margie Shell, CRISTOBAL 4/6/2022

## 2022-04-06 NOTE — PLAN OF CARE
Patient states pain is better in LUE after tylenol and ice, elevated on 2 pillows, area is bruised and swollen +2.  Ambulated in hallway with assist of PT.  Vitals stable, hemoglobin 8.7, no signs of bleeding.  Will continue to monitor.  Problem: Anemia  Goal: Anemia Symptom Improvement  Outcome: Ongoing, Progressing  Intervention: Monitor and Manage Anemia  Recent Flowsheet Documentation  Taken 4/5/2022 1700 by Ivania Wilson RN  Safety Promotion/Fall Prevention:    bed alarm on    chair alarm on    mobility aid in reach    increase visualization of patient    fall prevention program maintained    clutter free environment maintained    room near nurse's station    room door open    patient and family education    nonskid shoes/slippers when out of bed    safety round/check completed  Taken 4/5/2022 0930 by Iavnia Wilson RN  Safety Promotion/Fall Prevention:    bed alarm on    chair alarm on    mobility aid in reach    increase visualization of patient    fall prevention program maintained    clutter free environment maintained    room near nurse's station    room door open    patient and family education    nonskid shoes/slippers when out of bed    safety round/check completed   Goal Outcome Evaluation:

## 2022-04-06 NOTE — PROGRESS NOTES
Care Management Discharge Note    Discharge Date: 04/06/2022       Discharge Disposition: Transitional Care    Discharge Services:  Nursing PT and OT    Discharge DME: Walker    Discharge Transportation: car, drives self, family or friend will provide    Private pay costs discussed: Not applicable    PAS Confirmation Code:    Patient/family educated on Medicare website which has current facility and service quality ratings: yes    Education Provided on the Discharge Plan:  yes  Persons Notified of Discharge Plans: Patient and daughter Selena  Patient/Family in Agreement with the Plan: yes    Handoff Referral Completed: Yes    Additional Information:  Chart reviewed. EU[phone]- facility is able to accept pt for admission today.   EUGENIE met with patient and his daughter Selena in patient's room- both in agreement to accept TCU bed at [facility].   Pt can discharge after 12:00pm per facility. EUGENIE spoke to PT who reports pt should be able to safely transfer to a vehicle for transport to facility. Daughter Selena able to transport, pt agrees with this as well.  Message left for RN.      Christine Tyson LGSW

## 2022-04-06 NOTE — PLAN OF CARE
Goal Outcome Evaluation:          Overall Patient Progress: improving     Doing well overnight, denied pain, nausea or dyspnea. VSS. Pt is alert and oriented but forgetful at times. Continue to monitor.

## 2022-04-06 NOTE — PLAN OF CARE
"  Problem: Plan of Care - These are the overarching goals to be used throughout the patient stay.    Goal: Plan of Care Review/Shift Note  Description: The Plan of Care Review/Shift note should be completed every shift.  The Outcome Evaluation is a brief statement about your assessment that the patient is improving, declining, or no change.  This information will be displayed automatically on your shift note.  Outcome: Met  Flowsheets (Taken 4/6/2022 1304)  Plan of Care Reviewed With:   patient   daughter  Goal: Patient-Specific Goal (Individualized)  Description: You can add care plan individualizations to a care plan. Examples of Individualization might be:  \"Parent requests to be called daily at 9am for status\", \"I have a hard time hearing out of my right ear\", or \"Do not touch me to wake me up as it startles me\".  Outcome: Met  Goal: Absence of Hospital-Acquired Illness or Injury  Outcome: Met  Intervention: Identify and Manage Fall Risk  Recent Flowsheet Documentation  Taken 4/6/2022 1151 by Penny Lindsay RN  Safety Promotion/Fall Prevention:   assistive device/personal items within reach   bed alarm on   chair alarm on   clutter free environment maintained   fall prevention program maintained   increased rounding and observation   mobility aid in reach   nonskid shoes/slippers when out of bed   patient and family education   patient video monitoring   room door open   safety round/check completed  Taken 4/6/2022 0833 by Penny Lindsay RN  Safety Promotion/Fall Prevention:   assistive device/personal items within reach   bed alarm on   chair alarm on   clutter free environment maintained   fall prevention program maintained   increased rounding and observation   mobility aid in reach   nonskid shoes/slippers when out of bed   patient and family education   patient video monitoring   room door open   safety round/check completed  Intervention: Prevent Skin Injury  Recent Flowsheet Documentation  Taken 4/6/2022 " 1151 by Penny Lindsay RN  Body Position: position changed independently  Taken 4/6/2022 0833 by Penny Lindsay RN  Body Position: position changed independently  Intervention: Prevent and Manage VTE (Venous Thromboembolism) Risk  Recent Flowsheet Documentation  Taken 4/6/2022 1151 by Penny Lindsay RN  Activity Management:   activity adjusted per tolerance   activity encouraged   ambulated in room   ambulated to bathroom   up ad carina   up in chair  Taken 4/6/2022 0833 by Penny Lindsay RN  Activity Management:   activity adjusted per tolerance   activity encouraged   ambulated in room   ambulated to bathroom   up ad carina   up in chair  Goal: Optimal Comfort and Wellbeing  Outcome: Met  Goal: Readiness for Transition of Care  Outcome: Met  Flowsheets (Taken 4/6/2022 1304)  Transportation Anticipated: family or friend will provide  Concerns to be Addressed: all concerns addressed in this encounter  Intervention: Mutually Develop Transition Plan  Recent Flowsheet Documentation  Taken 4/6/2022 1304 by Penny Lindsay RN  Transportation Anticipated: family or friend will provide  Concerns to be Addressed: all concerns addressed in this encounter     Problem: Risk for Delirium  Goal: Optimal Coping  Outcome: Met  Goal: Improved Behavioral Control  Outcome: Met  Goal: Improved Attention and Thought Clarity  Outcome: Met  Goal: Improved Sleep  Outcome: Met     Problem: Anemia  Goal: Anemia Symptom Improvement  Outcome: Met  Intervention: Monitor and Manage Anemia  Recent Flowsheet Documentation  Taken 4/6/2022 1151 by Penny Lindsay RN  Safety Promotion/Fall Prevention:   assistive device/personal items within reach   bed alarm on   chair alarm on   clutter free environment maintained   fall prevention program maintained   increased rounding and observation   mobility aid in reach   nonskid shoes/slippers when out of bed   patient and family education   patient video monitoring   room door open   safety round/check completed  Taken  4/6/2022 0833 by Penny Lindsay RN  Safety Promotion/Fall Prevention:   assistive device/personal items within reach   bed alarm on   chair alarm on   clutter free environment maintained   fall prevention program maintained   increased rounding and observation   mobility aid in reach   nonskid shoes/slippers when out of bed   patient and family education   patient video monitoring   room door open   safety round/check completed     Problem: Cardiac Output Decreased  Goal: Effective Cardiac Output  Outcome: Met  Intervention: Optimize Cardiac Output  Recent Flowsheet Documentation  Taken 4/6/2022 1151 by Penny Lindsay RN  Head of Bed (HOB) Positioning: HOB at 20-30 degrees  Taken 4/6/2022 0833 by Penny Lindsay RN  Head of Bed (HOB) Positioning: HOB at 20-30 degrees   Goal Outcome Evaluation:      Plan of Care Reviewed With: patient, daughter     Pt has had an uneventful shift. VSS, pt denies pain. Pt has had good PO intake. Assist 1 with walker and gait belts is being used. Pt daughter is here to transport to [facility].

## 2022-04-07 ENCOUNTER — TRANSITIONAL CARE UNIT VISIT (OUTPATIENT)
Dept: GERIATRICS | Facility: CLINIC | Age: 87
End: 2022-04-07
Payer: COMMERCIAL

## 2022-04-07 VITALS
SYSTOLIC BLOOD PRESSURE: 145 MMHG | RESPIRATION RATE: 22 BRPM | OXYGEN SATURATION: 93 % | HEART RATE: 86 BPM | BODY MASS INDEX: 24.49 KG/M2 | WEIGHT: 184.8 LBS | TEMPERATURE: 96.7 F | HEIGHT: 73 IN | DIASTOLIC BLOOD PRESSURE: 72 MMHG

## 2022-04-07 DIAGNOSIS — K21.00 GASTROESOPHAGEAL REFLUX DISEASE WITH ESOPHAGITIS, UNSPECIFIED WHETHER HEMORRHAGE: ICD-10-CM

## 2022-04-07 DIAGNOSIS — I10 ESSENTIAL HYPERTENSION: ICD-10-CM

## 2022-04-07 DIAGNOSIS — F09 MILD COGNITIVE DISORDER: ICD-10-CM

## 2022-04-07 DIAGNOSIS — D50.0 IRON DEFICIENCY ANEMIA DUE TO CHRONIC BLOOD LOSS: ICD-10-CM

## 2022-04-07 DIAGNOSIS — I50.23 ACUTE ON CHRONIC SYSTOLIC HEART FAILURE (H): ICD-10-CM

## 2022-04-07 DIAGNOSIS — R63.4 WEIGHT LOSS: ICD-10-CM

## 2022-04-07 DIAGNOSIS — K27.4 GASTROINTESTINAL HEMORRHAGE ASSOCIATED WITH PEPTIC ULCER: Primary | ICD-10-CM

## 2022-04-07 DIAGNOSIS — R79.89 ELEVATED TROPONIN LEVEL NOT DUE TO ACUTE CORONARY SYNDROME: ICD-10-CM

## 2022-04-07 DIAGNOSIS — E11.9 TYPE 2 DIABETES MELLITUS WITHOUT COMPLICATION, WITHOUT LONG-TERM CURRENT USE OF INSULIN (H): ICD-10-CM

## 2022-04-07 PROBLEM — Z85.46 HISTORY OF MALIGNANT NEOPLASM OF PROSTATE: Status: ACTIVE | Noted: 2022-04-07

## 2022-04-07 PROBLEM — E04.1 THYROID NODULE: Status: ACTIVE | Noted: 2022-04-07

## 2022-04-07 PROBLEM — G47.33 OBSTRUCTIVE SLEEP APNEA SYNDROME: Status: ACTIVE | Noted: 2022-04-07

## 2022-04-07 PROBLEM — E78.2 MIXED HYPERLIPIDEMIA: Status: ACTIVE | Noted: 2022-04-07

## 2022-04-07 PROBLEM — D12.6 BENIGN NEOPLASM OF COLON: Status: ACTIVE | Noted: 2022-04-07

## 2022-04-07 PROBLEM — Z91.81 HISTORY OF FALL: Status: ACTIVE | Noted: 2022-04-07

## 2022-04-07 PROBLEM — K21.9 GASTROESOPHAGEAL REFLUX DISEASE WITHOUT ESOPHAGITIS: Status: ACTIVE | Noted: 2022-04-07

## 2022-04-07 LAB
GLIADIN IGA SER-ACNC: 11 U/ML
GLIADIN IGG SER-ACNC: 6.3 U/ML
IGA SERPL-MCNC: 96 MG/DL (ref 84–499)
TTG IGA SER-ACNC: <0.2 U/ML
TTG IGG SER-ACNC: <0.6 U/ML

## 2022-04-07 PROCEDURE — 99305 1ST NF CARE MODERATE MDM 35: CPT | Performed by: NURSE PRACTITIONER

## 2022-04-08 ENCOUNTER — LAB REQUISITION (OUTPATIENT)
Dept: LAB | Facility: CLINIC | Age: 87
End: 2022-04-08
Payer: COMMERCIAL

## 2022-04-08 DIAGNOSIS — D50.9 IRON DEFICIENCY ANEMIA, UNSPECIFIED: ICD-10-CM

## 2022-04-08 DIAGNOSIS — D64.9 ANEMIA, UNSPECIFIED: ICD-10-CM

## 2022-04-08 LAB — VIT C SERPL-MCNC: 39 UMOL/L

## 2022-04-08 NOTE — PROGRESS NOTES
[facility] GERIATRIC SERVICES    Code Status:  DNR   Visit Type:   Chief Complaint   Patient presents with     Hospital F/U     TCU Admission     Facility:  [facility] ([facility]) [[id number]]           History of Present Illness: Solitario Benjamin is a 86 year old male who I am seeing today for admit to the TCU.  Patient recently hospitalized on 4/1/2022 secondary to probable GI from esophagitis/gastritis.  Past medical history includes GERD with large hiatal hernia, gastritis, hypertension, acute on chronic systolic heart failure, diabetes mellitus type 2, prostate CA and weight loss.  Per the hospital records patient admitted with shortness of breath with severe anemia and a 30 pound weight loss with extensive bruising and falls.  He had been off of his diabetic meds for at least 6 weeks.  He had been taking ibuprofen for pain in his joints but had not noticed any blood in his stool.  Hemoglobin 4.6 with MCV of 63.  Patient treated with IV Protonix.  He was transfused for acute anemia and also given iron IV.  He underwent an EGD which showed a large hiatal hernia and esophagitis with gastritis.  No active bleeding was seen.  He did undergo colonoscopy where bleeding was seen.  PillCam was not suggested at this time however for continued blood loss was suggested in the future.  He was seen by cardiology.  His aspirin was decreased to 81 mg daily enteric-coated.  Amlodipine discontinued.  He was treated for hypertension with metoprolol and hydralazine.  Troponins were elevated but thought to be due to demand ischemia related to severe anemia.  He underwent an echo which showed an ejection fracture 25 to 30%.     Today patient sitting up in bedside chair.  He does have some underlying cognitive impairment noted.  He does attempt to answer questions appropriately.  He denies any pain in his abdomen.  He tells me he had about a 30 pound weight loss over the last 6 months.  He reported poor appetite however today reports  being very hungry and is eating very well.  No blood noted in his stool.  Hemoglobin now 8.5.  Blood pressure satisfactory.  Diabetes mellitus type 2.  Satisfactory controlled.  Patient denies any shortness of breath.  No chest pain.    Active Ambulatory Problems     Diagnosis Date Noted     Lactic acid acidosis 01/21/2018     Accelerated hypertension 01/21/2018     Type 2 diabetes mellitus without complication (H) 01/21/2018     Interstitial pneumonia (H) 01/22/2018     Essential hypertension 04/01/2022     COPD w Centrilobular emphysema  04/01/2022     Dyspnea on exertion 04/01/2022     Symptomatic anemia 04/01/2022     Ecchymosis of forearm 04/01/2022     Acute on chronic systolic heart failure (H) 04/02/2022     NSTEMI -- demand ischemia 04/02/2022     Iron deficiency anemia 04/04/2022     Prostate cancer (H) 04/04/2022     Acute blood loss anemia 04/04/2022     Moderate malnutrition (H) 04/05/2022     Thyroid nodule 04/07/2022     Obstructive sleep apnea syndrome 04/07/2022     Mixed hyperlipidemia 04/07/2022     Mild cognitive disorder 04/07/2022     History of malignant neoplasm of prostate 04/07/2022     History of fall 04/07/2022     Gastroesophageal reflux disease without esophagitis 04/07/2022     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     Resolved Ambulatory Problems     Diagnosis Date Noted     PAVAN (acute kidney injury) (H) 01/21/2018     Tachycardia 01/21/2018     Respiratory infection 01/21/2018     SOB (shortness of breath) 04/01/2022     Past Medical History:   Diagnosis Date     Type 2 diabetes mellitus without complication, without long-term current use of insulin (H) 1/21/2018       Current Outpatient Medications:      acetaminophen (TYLENOL) 325 MG tablet, Take 2 tablets (650 mg) by mouth every 4 hours as needed for mild pain or fever, Disp: , Rfl: 0     albuterol (PROVENTIL) (2.5 MG/3ML) 0.083% neb solution, Take 1 vial (2.5 mg) by nebulization every 6 hours as needed for wheezing, Disp: 90 mL,  "Rfl: 0     aspirin (ASA) 81 MG EC tablet, Take 1 tablet (81 mg) by mouth daily, Disp: , Rfl: 0     docusate sodium (COLACE) 100 MG capsule, Take 100 mg by mouth daily, Disp: , Rfl:      furosemide (LASIX) 20 MG tablet, Take 1 tablet (20 mg) by mouth daily, Disp: , Rfl: 0     glucosamine-chondroitin 500-400 MG CAPS per capsule, Take 3 capsules by mouth daily, Disp: , Rfl:      losartan (COZAAR) 25 MG tablet, Take 1 tablet (25 mg) by mouth daily, Disp: , Rfl: 0     metoprolol succinate ER (TOPROL-XL) 25 MG 24 hr tablet, Take 1 tablet (25 mg) by mouth daily, Disp: , Rfl: 0     multivitamin therapeutic tablet, [MULTIVITAMIN THERAPEUTIC TABLET] Take 1 tablet by mouth daily., Disp: , Rfl:      Omega-3 Fatty Acids (FISH OIL) 1200 MG capsule, Take 1,200 mg by mouth daily, Disp: , Rfl:      pantoprazole (PROTONIX) 40 MG EC tablet, Take 1 tablet (40 mg) by mouth 2 times daily, Disp: , Rfl: 0     rosuvastatin (CRESTOR) 5 MG tablet, Take 1 tablet (5 mg) by mouth every evening, Disp: , Rfl: 0     senna-docusate (SENOKOT-S/PERICOLACE) 8.6-50 MG tablet, Take 1 tablet by mouth 2 times daily, Disp: , Rfl: 0     spironolactone (ALDACTONE) 25 MG tablet, Take 1 tablet (25 mg) by mouth daily, Disp: , Rfl: 0     vitamin D3 (CHOLECALCIFEROL) 50 mcg (2000 units) tablet, Take 1 tablet (50 mcg) by mouth daily, Disp: , Rfl: 0  No Known Allergies    All Meds and Allergies reviewed in the record at the facility and is the most up-to-date    REVIEW OF SYSTEMS:   Review of Systems  No fevers or chills. No headache, lightheadedness or dizziness. No SOB, chest pains or palpitations. Appetite is greatly improved.. No nausea, vomiting, constipation or diarrhea.  No signs or symptoms of active bleeding.  No dysuria, frequency, burning or pain with urination. Otherwise review of systems are negative.     PHYSICAL EXAMINATION:  Physical Exam     Vital signs: BP (!) 145/72   Pulse 86   Temp (!) 96.7  F (35.9  C)   Resp 22   Ht 1.854 m (6' 1\")   Wt " 83.8 kg (184 lb 12.8 oz)   SpO2 93%   BMI 24.38 kg/m    General: Awake, Alert, oriented x1, appropriately, follows simple commands, conversant  HEENT: Pink conjunctiva, anicteric sclerae, moist oral mucosa  NECK: Supple, without any lymphadenopathy, or masses  CVS:  S1  S2, without murmur or gallop.   LUNG: Clear to auscultation, No wheezes, rales or rhonci.  BACK: No kyphosis of the thoracic spine  ABDOMEN: Soft, round, nontender to palpation, with positive bowel sounds  EXTREMITIES: Moves both upper and lower extremities with generalized weakness, no pedal edema, no calf tenderness  SKIN: Warm and dry.   NEUROLOGIC: Intact, pulses palpable  PSYCHIATRIC: Cognitive impairment noted.      Labs:  All labs reviewed in the nursing home record and Photos to Photos   @  Lab Results   Component Value Date    WBC 7.7 04/05/2022     Lab Results   Component Value Date    RBC 3.97 04/05/2022     Lab Results   Component Value Date    HGB 8.5 04/06/2022     Lab Results   Component Value Date    HCT 30.6 04/05/2022     Lab Results   Component Value Date    MCV 77 04/05/2022     Lab Results   Component Value Date    MCH 21.9 04/05/2022     Lab Results   Component Value Date    MCHC 28.4 04/05/2022     Lab Results   Component Value Date    RDW 27.3 04/05/2022     Lab Results   Component Value Date     04/05/2022        @Last Comprehensive Metabolic Panel:  Sodium   Date Value Ref Range Status   04/05/2022 139 136 - 145 mmol/L Final     Potassium   Date Value Ref Range Status   04/05/2022 3.8 3.5 - 5.0 mmol/L Final     Chloride   Date Value Ref Range Status   04/05/2022 106 98 - 107 mmol/L Final     Carbon Dioxide (CO2)   Date Value Ref Range Status   04/05/2022 23 22 - 31 mmol/L Final     Anion Gap   Date Value Ref Range Status   04/05/2022 10 5 - 18 mmol/L Final     Glucose   Date Value Ref Range Status   04/05/2022 107 70 - 125 mg/dL Final     GLUCOSE BY METER POCT   Date Value Ref Range Status   04/06/2022 103 (H) 70 - 99 mg/dL  Final     Urea Nitrogen   Date Value Ref Range Status   04/05/2022 11 8 - 28 mg/dL Final     Creatinine   Date Value Ref Range Status   04/05/2022 1.13 0.70 - 1.30 mg/dL Final     GFR Estimate   Date Value Ref Range Status   04/05/2022 64 >60 mL/min/1.73m2 Final     Comment:     Effective December 21, 2021 eGFRcr in adults is calculated using the 2021 CKD-EPI creatinine equation which includes age and gender (Carlota et al., NE, DOI: 10.1056/SYQFpm5989911)   03/17/2021 >60 >60 mL/min/1.73m2 Final     Calcium   Date Value Ref Range Status   04/05/2022 8.8 8.5 - 10.5 mg/dL Final         Assessment/Plan:    ICD-10-CM    1. Gastrointestinal hemorrhage associated with peptic ulcer  K27.4  patient continues on PPI.  No evidence of bleed.  Hemoglobin now 8.5.  Okay for PT OT eval and treat.   2. Gastroesophageal reflux disease with esophagitis, unspecified whether hemorrhage  K21.00  continues on PPI.   3. Essential hypertension  I10  status are controlled.   4. NSTEMI -- demand ischemia  R77.8  denies any chest pain or shortness of breath.   5. Acute on chronic systolic heart failure (H)  I50.23  continue to weigh daily.  Patient continues on Lasix 20 mg daily.  Follow-up BMP on Monday.   6. Mild cognitive disorder  F09  underlying cognitive impairment.  Speech therapy to eval and treat.   7. Iron deficiency anemia due to chronic blood loss  D50.0  patient given IV iron during hospitalization.  He did undergo blood transfusion.  Hemoglobin now 8.5.   8. Weight loss  R63.4  recent weight loss of 30 pounds over the last 6 months.  RD to consult.  Appetite improving.   9. Type 2 diabetes mellitus without complication, without long-term current use of insulin (H)  E11.9  patient no longer on diabetic medication for the last 6 weeks.  Hemoglobin A1c 5.7.  Continue diet control.       Follow-up CBC and BMP on Monday.    This note has been dictated using voice recognition software. Any grammatical or context distortions are  unintentional and inherent to the software    Electronically signed by: Meseret Nolen, CNP

## 2022-04-11 ENCOUNTER — TRANSITIONAL CARE UNIT VISIT (OUTPATIENT)
Dept: GERIATRICS | Facility: CLINIC | Age: 87
End: 2022-04-11

## 2022-04-11 ENCOUNTER — LAB REQUISITION (OUTPATIENT)
Dept: LAB | Facility: CLINIC | Age: 87
End: 2022-04-11
Payer: COMMERCIAL

## 2022-04-11 VITALS
HEART RATE: 72 BPM | WEIGHT: 178 LBS | OXYGEN SATURATION: 93 % | BODY MASS INDEX: 23.59 KG/M2 | RESPIRATION RATE: 20 BRPM | DIASTOLIC BLOOD PRESSURE: 72 MMHG | HEIGHT: 73 IN | SYSTOLIC BLOOD PRESSURE: 144 MMHG | TEMPERATURE: 96.8 F

## 2022-04-11 DIAGNOSIS — F09 MILD COGNITIVE DISORDER: ICD-10-CM

## 2022-04-11 DIAGNOSIS — I10 ESSENTIAL HYPERTENSION: ICD-10-CM

## 2022-04-11 DIAGNOSIS — K21.00 GASTROESOPHAGEAL REFLUX DISEASE WITH ESOPHAGITIS, UNSPECIFIED WHETHER HEMORRHAGE: ICD-10-CM

## 2022-04-11 DIAGNOSIS — I50.23 ACUTE ON CHRONIC SYSTOLIC HEART FAILURE (H): ICD-10-CM

## 2022-04-11 DIAGNOSIS — Z85.46 PERSONAL HISTORY OF MALIGNANT NEOPLASM OF PROSTATE: ICD-10-CM

## 2022-04-11 DIAGNOSIS — K27.4 GASTROINTESTINAL HEMORRHAGE ASSOCIATED WITH PEPTIC ULCER: Primary | ICD-10-CM

## 2022-04-11 DIAGNOSIS — R33.9 URINARY RETENTION: ICD-10-CM

## 2022-04-11 DIAGNOSIS — R79.89 ELEVATED TROPONIN LEVEL NOT DUE TO ACUTE CORONARY SYNDROME: ICD-10-CM

## 2022-04-11 LAB
ALBUMIN UR-MCNC: NEGATIVE MG/DL
ANION GAP SERPL CALCULATED.3IONS-SCNC: 12 MMOL/L (ref 5–18)
APPEARANCE UR: CLEAR
BILIRUB UR QL STRIP: NEGATIVE
BUN SERPL-MCNC: 13 MG/DL (ref 8–28)
CALCIUM SERPL-MCNC: 9.2 MG/DL (ref 8.5–10.5)
CHLORIDE BLD-SCNC: 103 MMOL/L (ref 98–107)
CO2 SERPL-SCNC: 23 MMOL/L (ref 22–31)
COLOR UR AUTO: ABNORMAL
CREAT SERPL-MCNC: 0.9 MG/DL (ref 0.7–1.3)
ERYTHROCYTE [DISTWIDTH] IN BLOOD BY AUTOMATED COUNT: 32.6 % (ref 10–15)
GFR SERPL CREATININE-BSD FRML MDRD: 84 ML/MIN/1.73M2
GLUCOSE BLD-MCNC: 103 MG/DL (ref 70–125)
GLUCOSE UR STRIP-MCNC: NEGATIVE MG/DL
HCT VFR BLD AUTO: 33.5 % (ref 40–53)
HGB BLD-MCNC: 10 G/DL (ref 13.3–17.7)
HGB UR QL STRIP: NEGATIVE
KETONES UR STRIP-MCNC: NEGATIVE MG/DL
LEUKOCYTE ESTERASE UR QL STRIP: NEGATIVE
MCH RBC QN AUTO: 24.4 PG (ref 26.5–33)
MCHC RBC AUTO-ENTMCNC: 29.9 G/DL (ref 31.5–36.5)
MCV RBC AUTO: 82 FL (ref 78–100)
MUCOUS THREADS #/AREA URNS LPF: PRESENT /LPF
NITRATE UR QL: NEGATIVE
PH UR STRIP: 6 [PH] (ref 5–7)
PLATELET # BLD AUTO: 175 10E3/UL (ref 150–450)
POTASSIUM BLD-SCNC: 3.9 MMOL/L (ref 3.5–5)
RBC # BLD AUTO: 4.09 10E6/UL (ref 4.4–5.9)
RBC URINE: 0 /HPF
SODIUM SERPL-SCNC: 138 MMOL/L (ref 136–145)
SP GR UR STRIP: 1.01 (ref 1–1.03)
UROBILINOGEN UR STRIP-MCNC: <2 MG/DL
WBC # BLD AUTO: 7 10E3/UL (ref 4–11)
WBC URINE: 0 /HPF

## 2022-04-11 PROCEDURE — 87086 URINE CULTURE/COLONY COUNT: CPT | Mod: ORL | Performed by: NURSE PRACTITIONER

## 2022-04-11 PROCEDURE — 80048 BASIC METABOLIC PNL TOTAL CA: CPT | Mod: ORL | Performed by: FAMILY MEDICINE

## 2022-04-11 PROCEDURE — 85027 COMPLETE CBC AUTOMATED: CPT | Mod: ORL | Performed by: FAMILY MEDICINE

## 2022-04-11 PROCEDURE — 36415 COLL VENOUS BLD VENIPUNCTURE: CPT | Mod: ORL | Performed by: FAMILY MEDICINE

## 2022-04-11 PROCEDURE — 81001 URINALYSIS AUTO W/SCOPE: CPT | Mod: ORL | Performed by: NURSE PRACTITIONER

## 2022-04-11 PROCEDURE — P9604 ONE-WAY ALLOW PRORATED TRIP: HCPCS | Mod: ORL | Performed by: FAMILY MEDICINE

## 2022-04-11 PROCEDURE — 99309 SBSQ NF CARE MODERATE MDM 30: CPT | Performed by: NURSE PRACTITIONER

## 2022-04-12 RX ORDER — TAMSULOSIN HYDROCHLORIDE 0.4 MG/1
0.4 CAPSULE ORAL DAILY
COMMUNITY

## 2022-04-12 NOTE — PROGRESS NOTES
[facility] GERIATRIC SERVICES    Code Status:  DNR   Visit Type:   Chief Complaint   Patient presents with     Nursing Home Acute     Facility:  [facility] ([facility]) [[id number]]           History of Present Illness: Solitario Benjamin is a 86 year old male who I am seeing today for follow-up on the TCU.  Patient recently hospitalized on 4/1/2022 secondary to probable GI from esophagitis/gastritis.  Past medical history includes GERD with large hiatal hernia, gastritis, hypertension, acute on chronic systolic heart failure, diabetes mellitus type 2, prostate CA and weight loss.  Per the hospital records patient admitted with shortness of breath with severe anemia and a 30 pound weight loss with extensive bruising and falls.  He had been off of his diabetic meds for at least 6 weeks.  He had been taking ibuprofen for pain in his joints but had not noticed any blood in his stool.  Hemoglobin 4.6 with MCV of 63.  Patient treated with IV Protonix.  He was transfused for acute anemia and also given iron IV.  He underwent an EGD which showed a large hiatal hernia and esophagitis with gastritis.  No active bleeding was seen.  He did undergo colonoscopy where bleeding was seen.  PillCam was not suggested at this time however for continued blood loss was suggested in the future.  He was seen by cardiology.  His aspirin was decreased to 81 mg daily enteric-coated.  Amlodipine discontinued.  He was treated for hypertension with metoprolol and hydralazine.  Troponins were elevated but thought to be due to demand ischemia related to severe anemia.  He underwent an echo which showed an ejection fracture 25 to 30%.     Today patient sitting up in bedside chair.  He does have some underlying cognitive impairment and is somewhat of a poor historian.  Recent GI bleed.  No further evidence of bleed.  Continues on PPI.  Hemoglobin now 10.0.  Nursing staff reporting urinary retention.  He did obtain 3 post void residuals yesterday into  the night shift which was greater than 200 cc.  He has a history of prostate CA.  He denies any dysuria burning or pain.  BMP unremarkable.  He does have a history of heart failure and continues on spironolactone and Lasix.  No shortness of breath or chest pain.  Blood pressure satisfactory.  Multiple falls.  He has moderate bruising in the left rib cage.  Today he is reporting some pain.  Tylenol was administered.  Diabetes mellitus type 2 satisfactory control.    Active Ambulatory Problems     Diagnosis Date Noted     Lactic acid acidosis 01/21/2018     Accelerated hypertension 01/21/2018     Type 2 diabetes mellitus without complication (H) 01/21/2018     Interstitial pneumonia (H) 01/22/2018     Essential hypertension 04/01/2022     COPD w Centrilobular emphysema  04/01/2022     Dyspnea on exertion 04/01/2022     Symptomatic anemia 04/01/2022     Ecchymosis of forearm 04/01/2022     Acute on chronic systolic heart failure (H) 04/02/2022     NSTEMI -- demand ischemia 04/02/2022     Iron deficiency anemia 04/04/2022     Prostate cancer (H) 04/04/2022     Acute blood loss anemia 04/04/2022     Moderate malnutrition (H) 04/05/2022     Thyroid nodule 04/07/2022     Obstructive sleep apnea syndrome 04/07/2022     Mixed hyperlipidemia 04/07/2022     Mild cognitive disorder 04/07/2022     History of malignant neoplasm of prostate 04/07/2022     History of fall 04/07/2022     Gastroesophageal reflux disease without esophagitis 04/07/2022     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     Resolved Ambulatory Problems     Diagnosis Date Noted     PAVAN (acute kidney injury) (H) 01/21/2018     Tachycardia 01/21/2018     Respiratory infection 01/21/2018     SOB (shortness of breath) 04/01/2022     Past Medical History:   Diagnosis Date     Type 2 diabetes mellitus without complication, without long-term current use of insulin (H) 1/21/2018       Current Outpatient Medications:      tamsulosin (FLOMAX) 0.4 MG capsule, Take 0.4 mg by  mouth in the morning., Disp: , Rfl:      acetaminophen (TYLENOL) 325 MG tablet, Take 2 tablets (650 mg) by mouth every 4 hours as needed for mild pain or fever, Disp: , Rfl: 0     albuterol (PROVENTIL) (2.5 MG/3ML) 0.083% neb solution, Take 1 vial (2.5 mg) by nebulization every 6 hours as needed for wheezing, Disp: 90 mL, Rfl: 0     aspirin (ASA) 81 MG EC tablet, Take 1 tablet (81 mg) by mouth daily, Disp: , Rfl: 0     docusate sodium (COLACE) 100 MG capsule, Take 100 mg by mouth daily, Disp: , Rfl:      furosemide (LASIX) 20 MG tablet, Take 1 tablet (20 mg) by mouth daily, Disp: , Rfl: 0     glucosamine-chondroitin 500-400 MG CAPS per capsule, Take 3 capsules by mouth daily, Disp: , Rfl:      losartan (COZAAR) 25 MG tablet, Take 1 tablet (25 mg) by mouth daily, Disp: , Rfl: 0     metoprolol succinate ER (TOPROL-XL) 25 MG 24 hr tablet, Take 1 tablet (25 mg) by mouth daily, Disp: , Rfl: 0     multivitamin therapeutic tablet, [MULTIVITAMIN THERAPEUTIC TABLET] Take 1 tablet by mouth daily., Disp: , Rfl:      Omega-3 Fatty Acids (FISH OIL) 1200 MG capsule, Take 1,200 mg by mouth daily, Disp: , Rfl:      pantoprazole (PROTONIX) 40 MG EC tablet, Take 1 tablet (40 mg) by mouth 2 times daily, Disp: , Rfl: 0     rosuvastatin (CRESTOR) 5 MG tablet, Take 1 tablet (5 mg) by mouth every evening, Disp: , Rfl: 0     senna-docusate (SENOKOT-S/PERICOLACE) 8.6-50 MG tablet, Take 1 tablet by mouth 2 times daily, Disp: , Rfl: 0     spironolactone (ALDACTONE) 25 MG tablet, Take 1 tablet (25 mg) by mouth daily, Disp: , Rfl: 0     vitamin D3 (CHOLECALCIFEROL) 50 mcg (2000 units) tablet, Take 1 tablet (50 mcg) by mouth daily, Disp: , Rfl: 0  No Known Allergies    All Meds and Allergies reviewed in the record at the facility and is the most up-to-date    REVIEW OF SYSTEMS:   Review of Systems  No fevers or chills. No headache, lightheadedness or dizziness. No SOB, chest pains or palpitations. Appetite is greatly improved.. No nausea,  "vomiting, constipation or diarrhea.  No signs or symptoms of active bleeding.  No dysuria, frequency, burning or pain with urination.  He is having some urinary retention with increased post void residuals over 200.  Pain in his left rib cage.  Otherwise review of systems are negative.     PHYSICAL EXAMINATION:  Physical Exam     Vital signs: BP (!) 144/72   Pulse 72   Temp 96.8  F (36  C)   Resp 20   Ht 1.854 m (6' 1\")   Wt 80.7 kg (178 lb)   SpO2 93%   BMI 23.48 kg/m    General: Awake, Alert, oriented x1, appropriately, follows simple commands, conversant  HEENT: Pink conjunctiva, anicteric sclerae, moist oral mucosa  NECK: Supple, without any lymphadenopathy, or masses  CVS:  S1  S2, without murmur or gallop.   LUNG: Clear to auscultation, No wheezes, rales or rhonci.  BACK: No kyphosis of the thoracic spine  ABDOMEN: Soft, round, nontender to palpation, with positive bowel sounds  EXTREMITIES: Moves both upper and lower extremities with generalized weakness, no pedal edema, no calf tenderness  SKIN: Warm and dry.  Large ecchymotic area over the left rib cage from history of fall.  NEUROLOGIC: Intact, pulses palpable  PSYCHIATRIC: Cognitive impairment noted.      Labs:  All labs reviewed in the nursing home record and Epic   @  Lab Results   Component Value Date    WBC 7.7 04/05/2022     Lab Results   Component Value Date    RBC 3.97 04/05/2022     Lab Results   Component Value Date    HGB 8.5 04/06/2022     Lab Results   Component Value Date    HCT 30.6 04/05/2022     Lab Results   Component Value Date    MCV 77 04/05/2022     Lab Results   Component Value Date    MCH 21.9 04/05/2022     Lab Results   Component Value Date    MCHC 28.4 04/05/2022     Lab Results   Component Value Date    RDW 27.3 04/05/2022     Lab Results   Component Value Date     04/05/2022        @Last Comprehensive Metabolic Panel:  Sodium   Date Value Ref Range Status   04/11/2022 138 136 - 145 mmol/L Final     Potassium "   Date Value Ref Range Status   04/11/2022 3.9 3.5 - 5.0 mmol/L Final     Chloride   Date Value Ref Range Status   04/11/2022 103 98 - 107 mmol/L Final     Carbon Dioxide (CO2)   Date Value Ref Range Status   04/11/2022 23 22 - 31 mmol/L Final     Anion Gap   Date Value Ref Range Status   04/11/2022 12 5 - 18 mmol/L Final     Glucose   Date Value Ref Range Status   04/11/2022 103 70 - 125 mg/dL Final     Urea Nitrogen   Date Value Ref Range Status   04/11/2022 13 8 - 28 mg/dL Final     Creatinine   Date Value Ref Range Status   04/11/2022 0.90 0.70 - 1.30 mg/dL Final     GFR Estimate   Date Value Ref Range Status   04/11/2022 84 >60 mL/min/1.73m2 Final     Comment:     Effective December 21, 2021 eGFRcr in adults is calculated using the 2021 CKD-EPI creatinine equation which includes age and gender (Carlota et al., [place], DOI: 10.1056/ISVJfw9122190)   03/17/2021 >60 >60 mL/min/1.73m2 Final     Calcium   Date Value Ref Range Status   04/11/2022 9.2 8.5 - 10.5 mg/dL Final         Assessment/Plan:    ICD-10-CM    1. Gastrointestinal hemorrhage associated with peptic ulcer  K27.4  patient continues on PPI.  No evidence of bleed.  Hemoglobin now 10.4.   2. Gastroesophageal reflux disease with esophagitis, unspecified whether hemorrhage  K21.00  continues on PPI.   3. Essential hypertension  I10  status are controlled.   4. NSTEMI -- demand ischemia  R77.8  denies any chest pain or shortness of breath.   5. Acute on chronic systolic heart failure (H)  I50.23  continue to weigh daily.  Patient continues on Lasix 20 mg daily.  Follow-up BMP unremarkable.   6. urinary retention   obtain UA UC.  Start Flomax 0.4 mg nightly.  History of prostate CA.   7. Type 2 diabetes mellitus without complication, without long-term current use of insulin (H)  E11.9  patient no longer on diabetic medication for the last 6 weeks.  Hemoglobin A1c 5.7.  Continue diet control.           This note has been dictated using voice recognition software.  Any grammatical or context distortions are unintentional and inherent to the software    Electronically signed by: Meseret Nolen CNP

## 2022-04-13 LAB — BACTERIA UR CULT: NORMAL

## 2022-04-19 ENCOUNTER — OFFICE VISIT (OUTPATIENT)
Dept: GERIATRICS | Facility: CLINIC | Age: 87
End: 2022-04-19
Payer: COMMERCIAL

## 2022-04-19 VITALS
RESPIRATION RATE: 18 BRPM | WEIGHT: 183.2 LBS | BODY MASS INDEX: 24.28 KG/M2 | HEART RATE: 76 BPM | DIASTOLIC BLOOD PRESSURE: 65 MMHG | HEIGHT: 73 IN | TEMPERATURE: 97.3 F | OXYGEN SATURATION: 95 % | SYSTOLIC BLOOD PRESSURE: 128 MMHG

## 2022-04-19 DIAGNOSIS — R33.9 URINARY RETENTION: ICD-10-CM

## 2022-04-19 DIAGNOSIS — F09 MILD COGNITIVE DISORDER: ICD-10-CM

## 2022-04-19 DIAGNOSIS — K27.4 GASTROINTESTINAL HEMORRHAGE ASSOCIATED WITH PEPTIC ULCER: Primary | ICD-10-CM

## 2022-04-19 DIAGNOSIS — D50.0 IRON DEFICIENCY ANEMIA DUE TO CHRONIC BLOOD LOSS: ICD-10-CM

## 2022-04-19 DIAGNOSIS — I10 ESSENTIAL HYPERTENSION: ICD-10-CM

## 2022-04-19 DIAGNOSIS — K21.00 GASTROESOPHAGEAL REFLUX DISEASE WITH ESOPHAGITIS, UNSPECIFIED WHETHER HEMORRHAGE: ICD-10-CM

## 2022-04-19 DIAGNOSIS — I50.23 ACUTE ON CHRONIC SYSTOLIC HEART FAILURE (H): ICD-10-CM

## 2022-04-19 DIAGNOSIS — R79.89 ELEVATED TROPONIN LEVEL NOT DUE TO ACUTE CORONARY SYNDROME: ICD-10-CM

## 2022-04-19 DIAGNOSIS — E11.9 TYPE 2 DIABETES MELLITUS WITHOUT COMPLICATION, WITHOUT LONG-TERM CURRENT USE OF INSULIN (H): ICD-10-CM

## 2022-04-19 PROBLEM — R63.4 WEIGHT LOSS: Status: ACTIVE | Noted: 2022-04-19

## 2022-04-19 PROCEDURE — 99309 SBSQ NF CARE MODERATE MDM 30: CPT | Performed by: NURSE PRACTITIONER

## 2022-04-20 PROCEDURE — 87086 URINE CULTURE/COLONY COUNT: CPT | Mod: ORL | Performed by: NURSE PRACTITIONER

## 2022-04-20 PROCEDURE — 81001 URINALYSIS AUTO W/SCOPE: CPT | Mod: ORL | Performed by: NURSE PRACTITIONER

## 2022-04-20 NOTE — PROGRESS NOTES
[facility] GERIATRIC SERVICES    Code Status:  DNR   Visit Type:   Chief Complaint   Patient presents with     Nursing Home Acute     TCU Follow up     Facility:  [facility] ([facility]) [[id number]]           History of Present Illness: Solitario Benjamin is a 86 year old male who I am seeing today for follow-up on the TCU.  Patient recently hospitalized on 4/1/2022 secondary to probable GI from esophagitis/gastritis.  Past medical history includes GERD with large hiatal hernia, gastritis, hypertension, acute on chronic systolic heart failure, diabetes mellitus type 2, prostate CA and weight loss.  Per the hospital records patient admitted with shortness of breath with severe anemia and a 30 pound weight loss with extensive bruising and falls.  He had been off of his diabetic meds for at least 6 weeks.  He had been taking ibuprofen for pain in his joints but had not noticed any blood in his stool.  Hemoglobin 4.6 with MCV of 63.  Patient treated with IV Protonix.  He was transfused for acute anemia and also given iron IV.  He underwent an EGD which showed a large hiatal hernia and esophagitis with gastritis.  No active bleeding was seen.  He did undergo colonoscopy where bleeding was seen.  PillCam was not suggested at this time however for continued blood loss was suggested in the future.  He was seen by cardiology.  His aspirin was decreased to 81 mg daily enteric-coated.  Amlodipine discontinued.  He was treated for hypertension with metoprolol and hydralazine.  Troponins were elevated but thought to be due to demand ischemia related to severe anemia.  He underwent an echo which showed an ejection fracture 25 to 30%.     Today patient sitting up in bedside chair.  He does have some underlying cognitive impairment and is somewhat of a poor historian.  Recent GI bleed.  No further evidence of bleed.  Continues on PPI.  Patient denies any abdominal discomfort.  Acute blood loss anemia.  Hemoglobin now 10.0. Hx  of  prostate CA. no evidence of urinary retention.  He did have a UA UC which was negative.He does have a history of heart failure and continues on spironolactone and Lasix.  No shortness of breath or chest pain.  Patient initially had some weight loss most likely fluid.  His appetite has greatly improved.  And is now having some weight gain.  But no signs or symptoms of fluid overload.  Blood pressure satisfactory.  Diabetes mellitus type 2 satisfactory control.    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     Dyspnea on exertion 04/01/2022     Symptomatic anemia 04/01/2022     Ecchymosis of forearm 04/01/2022     Acute on chronic systolic heart failure (H) 04/02/2022     NSTEMI -- demand ischemia 04/02/2022     Iron deficiency anemia due to chronic blood loss 04/04/2022     Prostate cancer (H) 04/04/2022     Acute blood loss anemia 04/04/2022     Moderate malnutrition (H) 04/05/2022     Thyroid nodule 04/07/2022     Obstructive sleep apnea syndrome 04/07/2022     Mixed hyperlipidemia 04/07/2022     Mild cognitive disorder 04/07/2022     History of malignant neoplasm of prostate 04/07/2022     History of fall 04/07/2022     Gastroesophageal reflux disease without esophagitis 04/07/2022     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     Gastrointestinal hemorrhage associated with peptic ulcer 04/19/2022     Gastroesophageal reflux disease with esophagitis, unspecified whether hemorrhage 04/19/2022     Urinary retention 04/19/2022     Weight loss 04/19/2022     Resolved Ambulatory Problems     Diagnosis Date Noted     PAVAN (acute kidney injury) (H) 01/21/2018     Tachycardia 01/21/2018     Respiratory infection  01/21/2018     SOB (shortness of breath) 04/01/2022     No Additional Past Medical History       Current Outpatient Medications:      acetaminophen (TYLENOL) 325 MG tablet, Take 2 tablets (650 mg) by mouth every 4 hours as needed for mild pain or fever, Disp: , Rfl: 0     albuterol (PROVENTIL) (2.5 MG/3ML) 0.083% neb solution, Take 1 vial (2.5 mg) by nebulization every 6 hours as needed for wheezing, Disp: 90 mL, Rfl: 0     aspirin (ASA) 81 MG EC tablet, Take 1 tablet (81 mg) by mouth daily, Disp: , Rfl: 0     docusate sodium (COLACE) 100 MG capsule, Take 100 mg by mouth daily, Disp: , Rfl:      furosemide (LASIX) 20 MG tablet, Take 1 tablet (20 mg) by mouth daily, Disp: , Rfl: 0     glucosamine-chondroitin 500-400 MG CAPS per capsule, Take 3 capsules by mouth daily, Disp: , Rfl:      losartan (COZAAR) 25 MG tablet, Take 1 tablet (25 mg) by mouth daily, Disp: , Rfl: 0     metoprolol succinate ER (TOPROL-XL) 25 MG 24 hr tablet, Take 1 tablet (25 mg) by mouth daily, Disp: , Rfl: 0     multivitamin therapeutic tablet, [MULTIVITAMIN THERAPEUTIC TABLET] Take 1 tablet by mouth daily., Disp: , Rfl:      Omega-3 Fatty Acids (FISH OIL) 1200 MG capsule, Take 1,200 mg by mouth daily, Disp: , Rfl:      pantoprazole (PROTONIX) 40 MG EC tablet, Take 1 tablet (40 mg) by mouth 2 times daily, Disp: , Rfl: 0     rosuvastatin (CRESTOR) 5 MG tablet, Take 1 tablet (5 mg) by mouth every evening, Disp: , Rfl: 0     senna-docusate (SENOKOT-S/PERICOLACE) 8.6-50 MG tablet, Take 1 tablet by mouth 2 times daily, Disp: , Rfl: 0     spironolactone (ALDACTONE) 25 MG tablet, Take 1 tablet (25 mg) by mouth daily, Disp: , Rfl: 0     tamsulosin (FLOMAX) 0.4 MG capsule, Take 0.4 mg by mouth in the morning., Disp: , Rfl:      vitamin D3 (CHOLECALCIFEROL) 50 mcg (2000 units) tablet, Take 1 tablet (50 mcg) by mouth daily, Disp: , Rfl: 0  No Known Allergies    All Meds and Allergies reviewed in the record at the facility and is the most  "up-to-date    REVIEW OF SYSTEMS:   Review of Systems  No fevers or chills. No headache, lightheadedness or dizziness. No SOB, chest pains or palpitations. Appetite is greatly improved.. No nausea, vomiting, constipation or diarrhea.  No signs or symptoms of active bleeding.  No dysuria, frequency, burning or pain with urination.  No further urinary tension.    PHYSICAL EXAMINATION:  Physical Exam     Vital signs: /65   Pulse 76   Temp 97.3  F (36.3  C)   Resp 18   Ht 1.854 m (6' 1\")   Wt 83.1 kg (183 lb 3.2 oz)   SpO2 95%   BMI 24.17 kg/m    General: Awake, Alert, oriented x1, appropriately, follows simple commands, conversant  HEENT: Pink conjunctiva, anicteric sclerae, moist oral mucosa  NECK: Supple, without any lymphadenopathy, or masses  CVS:  S1  S2, without murmur or gallop.   LUNG: Clear to auscultation, No wheezes, rales or rhonci.  BACK: No kyphosis of the thoracic spine  ABDOMEN: Soft, round, nontender to palpation, with positive bowel sounds  EXTREMITIES: Moves both upper and lower extremities with generalized weakness, no pedal edema, no calf tenderness  SKIN: Warm and dry.  Large ecchymotic area over the left rib cage from history of fall.  NEUROLOGIC: Intact, pulses palpable  PSYCHIATRIC: Cognitive impairment noted.      Labs:  All labs reviewed in the nursing home record and Epic   @  Lab Results   Component Value Date    WBC 7.7 04/05/2022     Lab Results   Component Value Date    RBC 3.97 04/05/2022     Lab Results   Component Value Date    HGB 8.5 04/06/2022     Lab Results   Component Value Date    HCT 30.6 04/05/2022     Lab Results   Component Value Date    MCV 77 04/05/2022     Lab Results   Component Value Date    MCH 21.9 04/05/2022     Lab Results   Component Value Date    MCHC 28.4 04/05/2022     Lab Results   Component Value Date    RDW 27.3 04/05/2022     Lab Results   Component Value Date     04/05/2022        @Last Comprehensive Metabolic Panel:  Sodium   Date Value " Ref Range Status   04/11/2022 138 136 - 145 mmol/L Final     Potassium   Date Value Ref Range Status   04/11/2022 3.9 3.5 - 5.0 mmol/L Final     Chloride   Date Value Ref Range Status   04/11/2022 103 98 - 107 mmol/L Final     Carbon Dioxide (CO2)   Date Value Ref Range Status   04/11/2022 23 22 - 31 mmol/L Final     Anion Gap   Date Value Ref Range Status   04/11/2022 12 5 - 18 mmol/L Final     Glucose   Date Value Ref Range Status   04/11/2022 103 70 - 125 mg/dL Final     Urea Nitrogen   Date Value Ref Range Status   04/11/2022 13 8 - 28 mg/dL Final     Creatinine   Date Value Ref Range Status   04/11/2022 0.90 0.70 - 1.30 mg/dL Final     GFR Estimate   Date Value Ref Range Status   04/11/2022 84 >60 mL/min/1.73m2 Final     Comment:     Effective December 21, 2021 eGFRcr in adults is calculated using the 2021 CKD-EPI creatinine equation which includes age and gender (Carlota et al., NE, DOI: 10.1056/NGWSkw4855536)   03/17/2021 >60 >60 mL/min/1.73m2 Final     Calcium   Date Value Ref Range Status   04/11/2022 9.2 8.5 - 10.5 mg/dL Final         Assessment/Plan:    ICD-10-CM    1. Gastrointestinal hemorrhage associated with peptic ulcer  K27.4  patient continues on PPI.  No evidence of bleed.  Hemoglobin now 10.4.   2. Gastroesophageal reflux disease with esophagitis, unspecified whether hemorrhage  K21.00  continues on PPI.   3. Essential hypertension  I10  status are controlled.   4. NSTEMI -- demand ischemia  R77.8  denies any chest pain or shortness of breath.   5. Acute on chronic systolic heart failure (H)  I50.23  continue to weigh daily.  Patient continues on Lasix 20 mg daily.  Weight is up however feel this is due to increased appetite.  Follow-up BMP unremarkable.   6. urinary retention   no further urinary retention.  Continues on Flomax.  UA UC negative.   7. Type 2 diabetes mellitus without complication, without long-term current use of insulin (H)  E11.9  patient no longer on diabetic medication for  the last 6 weeks.  Hemoglobin A1c 5.7.  Continue diet control.           This note has been dictated using voice recognition software. Any grammatical or context distortions are unintentional and inherent to the software    Electronically signed by: Meseret Nolen CNP

## 2022-04-21 ENCOUNTER — LAB REQUISITION (OUTPATIENT)
Dept: LAB | Facility: CLINIC | Age: 87
End: 2022-04-21
Payer: COMMERCIAL

## 2022-04-21 ENCOUNTER — TRANSITIONAL CARE UNIT VISIT (OUTPATIENT)
Dept: GERIATRICS | Facility: CLINIC | Age: 87
End: 2022-04-21
Payer: COMMERCIAL

## 2022-04-21 VITALS
DIASTOLIC BLOOD PRESSURE: 70 MMHG | HEART RATE: 79 BPM | TEMPERATURE: 98 F | HEIGHT: 73 IN | OXYGEN SATURATION: 97 % | WEIGHT: 183.2 LBS | SYSTOLIC BLOOD PRESSURE: 124 MMHG | RESPIRATION RATE: 18 BRPM | BODY MASS INDEX: 24.28 KG/M2

## 2022-04-21 DIAGNOSIS — F09 MILD COGNITIVE DISORDER: ICD-10-CM

## 2022-04-21 DIAGNOSIS — R33.9 RETENTION OF URINE, UNSPECIFIED: ICD-10-CM

## 2022-04-21 DIAGNOSIS — R79.89 ELEVATED TROPONIN LEVEL NOT DUE TO ACUTE CORONARY SYNDROME: ICD-10-CM

## 2022-04-21 DIAGNOSIS — R33.9 URINARY RETENTION: ICD-10-CM

## 2022-04-21 DIAGNOSIS — I50.23 ACUTE ON CHRONIC SYSTOLIC HEART FAILURE (H): ICD-10-CM

## 2022-04-21 DIAGNOSIS — D50.0 IRON DEFICIENCY ANEMIA DUE TO CHRONIC BLOOD LOSS: ICD-10-CM

## 2022-04-21 DIAGNOSIS — K27.4 GASTROINTESTINAL HEMORRHAGE ASSOCIATED WITH PEPTIC ULCER: Primary | ICD-10-CM

## 2022-04-21 DIAGNOSIS — K21.00 GASTROESOPHAGEAL REFLUX DISEASE WITH ESOPHAGITIS, UNSPECIFIED WHETHER HEMORRHAGE: ICD-10-CM

## 2022-04-21 DIAGNOSIS — I10 ESSENTIAL HYPERTENSION: ICD-10-CM

## 2022-04-21 DIAGNOSIS — E11.9 TYPE 2 DIABETES MELLITUS WITHOUT COMPLICATION, WITHOUT LONG-TERM CURRENT USE OF INSULIN (H): ICD-10-CM

## 2022-04-21 LAB
ALBUMIN UR-MCNC: NEGATIVE MG/DL
APPEARANCE UR: CLEAR
BILIRUB UR QL STRIP: NEGATIVE
COLOR UR AUTO: YELLOW
GLUCOSE UR STRIP-MCNC: NEGATIVE MG/DL
HGB UR QL STRIP: NEGATIVE
KETONES UR STRIP-MCNC: NEGATIVE MG/DL
LEUKOCYTE ESTERASE UR QL STRIP: NEGATIVE
MUCOUS THREADS #/AREA URNS LPF: PRESENT /LPF
NITRATE UR QL: NEGATIVE
PH UR STRIP: 5.5 [PH] (ref 5–7)
RBC URINE: 0 /HPF
SP GR UR STRIP: 1.02 (ref 1–1.03)
UROBILINOGEN UR STRIP-MCNC: <2 MG/DL
WBC URINE: 1 /HPF

## 2022-04-21 PROCEDURE — 99309 SBSQ NF CARE MODERATE MDM 30: CPT | Performed by: NURSE PRACTITIONER

## 2022-04-21 NOTE — PROGRESS NOTES
[facility] GERIATRIC SERVICES    Code Status:  DNR   Visit Type:   Chief Complaint   Patient presents with     Nursing Home Acute     TCU Follow up     Facility:  [facility] ([facility]) [[id number]]           History of Present Illness: Solitario Benjamin is a 86 year old male who I am seeing today for follow-up on the TCU.  Patient recently hospitalized on 4/1/2022 secondary to probable GI from esophagitis/gastritis.  Past medical history includes GERD with large hiatal hernia, gastritis, hypertension, acute on chronic systolic heart failure, diabetes mellitus type 2, prostate CA and weight loss.  Per the hospital records patient admitted with shortness of breath with severe anemia and a 30 pound weight loss with extensive bruising and falls.  He had been off of his diabetic meds for at least 6 weeks.  He had been taking ibuprofen for pain in his joints but had not noticed any blood in his stool.  Hemoglobin 4.6 with MCV of 63.  Patient treated with IV Protonix.  He was transfused for acute anemia and also given iron IV.  He underwent an EGD which showed a large hiatal hernia and esophagitis with gastritis.  No active bleeding was seen.  He did undergo colonoscopy where bleeding was seen.  PillCam was not suggested at this time however for continued blood loss was suggested in the future.  He was seen by cardiology.  His aspirin was decreased to 81 mg daily enteric-coated.  Amlodipine discontinued.  He was treated for hypertension with metoprolol and hydralazine.  Troponins were elevated but thought to be due to demand ischemia related to severe anemia.  He underwent an echo which showed an ejection fracture 25 to 30%.     Today patient sitting up in bedside chair.  He does have some underlying cognitive impairment and is somewhat of a poor historian. Yesterday nursing staff reported 2 episodes of incontinence which was new. Pt does attempt to toilet himself. Pt with hx of BPH with prostate CA.  He continues on  Flomax. He denies any abdominal discomfort. He feels he is emptying well. UA/UC was obtain. Trace mucus otherwise negative. UC pending. PVR obtained which was negative. Recent GI bleed.  No further evidence of bleed.  Continues on PPI.  Patient denies any abdominal discomfort.  Acute blood loss anemia.  Hemoglobin now 10.0. History of heart failure and continues on spironolactone and Lasix.  No shortness of breath or chest pain.  Patient initially had some weight loss most likely fluid.  His appetite has greatly improved and he is having weight gain.  But no signs or symptoms of fluid overload.  Blood pressure satisfactory.  Diabetes mellitus type 2 satisfactory control.     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     Dyspnea on exertion 04/01/2022     Symptomatic anemia 04/01/2022     Ecchymosis of forearm 04/01/2022     Acute on chronic systolic heart failure (H) 04/02/2022     NSTEMI -- demand ischemia 04/02/2022     Iron deficiency anemia due to chronic blood loss 04/04/2022     Prostate cancer (H) 04/04/2022     Acute blood loss anemia 04/04/2022     Moderate malnutrition (H) 04/05/2022     Thyroid nodule 04/07/2022     Obstructive sleep apnea syndrome 04/07/2022     Mixed hyperlipidemia 04/07/2022     Mild cognitive disorder 04/07/2022     History of malignant neoplasm of prostate 04/07/2022     History of fall 04/07/2022     Gastroesophageal reflux disease without esophagitis 04/07/2022     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     Gastrointestinal hemorrhage associated with peptic ulcer 04/19/2022     Gastroesophageal reflux disease with esophagitis, unspecified whether hemorrhage 04/19/2022      Urinary retention 04/19/2022     Weight loss 04/19/2022     Resolved Ambulatory Problems     Diagnosis Date Noted     PAVAN (acute kidney injury) (H) 01/21/2018     Tachycardia 01/21/2018     Respiratory infection 01/21/2018     SOB (shortness of breath) 04/01/2022     No Additional Past Medical History       Current Outpatient Medications:      acetaminophen (TYLENOL) 325 MG tablet, Take 2 tablets (650 mg) by mouth every 4 hours as needed for mild pain or fever, Disp: , Rfl: 0     albuterol (PROVENTIL) (2.5 MG/3ML) 0.083% neb solution, Take 1 vial (2.5 mg) by nebulization every 6 hours as needed for wheezing, Disp: 90 mL, Rfl: 0     aspirin (ASA) 81 MG EC tablet, Take 1 tablet (81 mg) by mouth daily, Disp: , Rfl: 0     docusate sodium (COLACE) 100 MG capsule, Take 100 mg by mouth daily, Disp: , Rfl:      furosemide (LASIX) 20 MG tablet, Take 1 tablet (20 mg) by mouth daily, Disp: , Rfl: 0     glucosamine-chondroitin 500-400 MG CAPS per capsule, Take 3 capsules by mouth daily, Disp: , Rfl:      losartan (COZAAR) 25 MG tablet, Take 1 tablet (25 mg) by mouth daily, Disp: , Rfl: 0     metoprolol succinate ER (TOPROL-XL) 25 MG 24 hr tablet, Take 1 tablet (25 mg) by mouth daily, Disp: , Rfl: 0     multivitamin therapeutic tablet, [MULTIVITAMIN THERAPEUTIC TABLET] Take 1 tablet by mouth daily., Disp: , Rfl:      Omega-3 Fatty Acids (FISH OIL) 1200 MG capsule, Take 1,200 mg by mouth daily, Disp: , Rfl:      pantoprazole (PROTONIX) 40 MG EC tablet, Take 1 tablet (40 mg) by mouth 2 times daily, Disp: , Rfl: 0     rosuvastatin (CRESTOR) 5 MG tablet, Take 1 tablet (5 mg) by mouth every evening, Disp: , Rfl: 0     senna-docusate (SENOKOT-S/PERICOLACE) 8.6-50 MG tablet, Take 1 tablet by mouth 2 times daily, Disp: , Rfl: 0     spironolactone (ALDACTONE) 25 MG tablet, Take 1 tablet (25 mg) by mouth daily, Disp: , Rfl: 0     tamsulosin (FLOMAX) 0.4 MG capsule, Take 0.4 mg by mouth in the morning., Disp: , Rfl:      vitamin D3  "(CHOLECALCIFEROL) 50 mcg (2000 units) tablet, Take 1 tablet (50 mcg) by mouth daily, Disp: , Rfl: 0  No Known Allergies    All Meds and Allergies reviewed in the record at the facility and is the most up-to-date    REVIEW OF SYSTEMS:   Review of Systems  No fevers or chills. No headache, lightheadedness or dizziness. No SOB, chest pains or palpitations. Appetite is greatly improved.. No nausea, vomiting, constipation or diarrhea.  No signs or symptoms of active bleeding.  No dysuria, frequency, burning or pain with urination.  Denies urinary retention.     PHYSICAL EXAMINATION:  Physical Exam     Vital signs: /70   Pulse 79   Temp 98  F (36.7  C)   Resp 18   Ht 1.854 m (6' 1\")   Wt 83.1 kg (183 lb 3.2 oz)   SpO2 97%   BMI 24.17 kg/m    General: Awake, Alert, oriented x1,  follows simple commands, conversant  HEENT: Pink conjunctiva, anicteric sclerae, moist oral mucosa  NECK: Supple  CVS:  S1  S2, without murmur or gallop.   LUNG: Clear to auscultation, No wheezes, rales or rhonci.  BACK: No kyphosis of the thoracic spine  ABDOMEN: Soft, round, nontender to palpation, with positive bowel sounds  EXTREMITIES: Moves both upper and lower extremities with generalized weakness, no pedal edema, no calf tenderness  SKIN: Warm and dry.  Large ecchymotic area over the left rib cage from history of fall.  NEUROLOGIC: Intact, pulses palpable  PSYCHIATRIC: Cognitive impairment noted.      Labs:  All labs reviewed in the nursing home record and Epic   @  Lab Results   Component Value Date    WBC 7.7 04/05/2022     Lab Results   Component Value Date    RBC 3.97 04/05/2022     Lab Results   Component Value Date    HGB 8.5 04/06/2022     Lab Results   Component Value Date    HCT 30.6 04/05/2022     Lab Results   Component Value Date    MCV 77 04/05/2022     Lab Results   Component Value Date    MCH 21.9 04/05/2022     Lab Results   Component Value Date    MCHC 28.4 04/05/2022     Lab Results   Component Value Date    " RDW 27.3 04/05/2022     Lab Results   Component Value Date     04/05/2022        @Last Comprehensive Metabolic Panel:  Sodium   Date Value Ref Range Status   04/11/2022 138 136 - 145 mmol/L Final     Potassium   Date Value Ref Range Status   04/11/2022 3.9 3.5 - 5.0 mmol/L Final     Chloride   Date Value Ref Range Status   04/11/2022 103 98 - 107 mmol/L Final     Carbon Dioxide (CO2)   Date Value Ref Range Status   04/11/2022 23 22 - 31 mmol/L Final     Anion Gap   Date Value Ref Range Status   04/11/2022 12 5 - 18 mmol/L Final     Glucose   Date Value Ref Range Status   04/11/2022 103 70 - 125 mg/dL Final     Urea Nitrogen   Date Value Ref Range Status   04/11/2022 13 8 - 28 mg/dL Final     Creatinine   Date Value Ref Range Status   04/11/2022 0.90 0.70 - 1.30 mg/dL Final     GFR Estimate   Date Value Ref Range Status   04/11/2022 84 >60 mL/min/1.73m2 Final     Comment:     Effective December 21, 2021 eGFRcr in adults is calculated using the 2021 CKD-EPI creatinine equation which includes age and gender (Carlota et al., NEJ, DOI: 10.1056/[id number])   03/17/2021 >60 >60 mL/min/1.73m2 Final     Calcium   Date Value Ref Range Status   04/11/2022 9.2 8.5 - 10.5 mg/dL Final         Assessment/Plan:    ICD-10-CM    1. Gastrointestinal hemorrhage associated with peptic ulcer  K27.4  patient continues on PPI.  No evidence of bleed.  Hemoglobin now 10.4.   2. Gastroesophageal reflux disease with esophagitis, unspecified whether hemorrhage  K21.00  continues on PPI.   3. Essential hypertension  I10  status are controlled.   4. NSTEMI -- demand ischemia  R77.8  denies any chest pain or shortness of breath.   5. Acute on chronic systolic heart failure (H)  I50.23  continue to weigh daily.  Patient continues on Lasix 20 mg daily.  Weight is up however feel this is due to increased appetite.  Follow-up BMP unremarkable.   6. urinary retention   no further urinary retention. PVR today 0.  Continues on Flomax.    Repeat  UA trace mucus. UC pending.    7. Type 2 diabetes mellitus without complication, without long-term current use of insulin (H)  E11.9  patient no longer on diabetic medication for the last 6 weeks.  Hemoglobin A1c 5.7.  Continue diet control.           This note has been dictated using voice recognition software. Any grammatical or context distortions are unintentional and inherent to the software    Electronically signed by: Meseret Nolen CNP

## 2022-04-22 LAB — BACTERIA UR CULT: NORMAL

## 2022-04-25 ENCOUNTER — TRANSITIONAL CARE UNIT VISIT (OUTPATIENT)
Dept: GERIATRICS | Facility: CLINIC | Age: 87
End: 2022-04-25
Payer: COMMERCIAL

## 2022-04-25 VITALS
OXYGEN SATURATION: 96 % | HEIGHT: 73 IN | SYSTOLIC BLOOD PRESSURE: 136 MMHG | WEIGHT: 180.6 LBS | TEMPERATURE: 96.7 F | HEART RATE: 83 BPM | DIASTOLIC BLOOD PRESSURE: 70 MMHG | BODY MASS INDEX: 23.94 KG/M2 | RESPIRATION RATE: 20 BRPM

## 2022-04-25 DIAGNOSIS — I50.23 ACUTE ON CHRONIC SYSTOLIC HEART FAILURE (H): ICD-10-CM

## 2022-04-25 DIAGNOSIS — R33.9 URINARY RETENTION: ICD-10-CM

## 2022-04-25 DIAGNOSIS — K27.4 GASTROINTESTINAL HEMORRHAGE ASSOCIATED WITH PEPTIC ULCER: Primary | ICD-10-CM

## 2022-04-25 DIAGNOSIS — K21.00 GASTROESOPHAGEAL REFLUX DISEASE WITH ESOPHAGITIS, UNSPECIFIED WHETHER HEMORRHAGE: ICD-10-CM

## 2022-04-25 DIAGNOSIS — I10 ESSENTIAL HYPERTENSION: ICD-10-CM

## 2022-04-25 PROCEDURE — 99309 SBSQ NF CARE MODERATE MDM 30: CPT | Performed by: NURSE PRACTITIONER

## 2022-04-26 NOTE — PROGRESS NOTES
[facility] GERIATRIC SERVICES    Code Status:  DNR   Visit Type:   Chief Complaint   Patient presents with     Nursing Home Acute     TCU Follow up     Facility:  [facility] ([facility]) [[id number]]           History of Present Illness: Solitario Benjamin is a 86 year old male who I am seeing today for follow-up on the TCU.  Patient recently hospitalized on 4/1/2022 secondary to probable GI from esophagitis/gastritis.  Past medical history includes GERD with large hiatal hernia, gastritis, hypertension, acute on chronic systolic heart failure, diabetes mellitus type 2, prostate CA and weight loss.  Per the hospital records patient admitted with shortness of breath with severe anemia and a 30 pound weight loss with extensive bruising and falls.  He had been off of his diabetic meds for at least 6 weeks.  He had been taking ibuprofen for pain in his joints but had not noticed any blood in his stool.  Hemoglobin 4.6 with MCV of 63.  Patient treated with IV Protonix.  He was transfused for acute anemia and also given iron IV.  He underwent an EGD which showed a large hiatal hernia and esophagitis with gastritis.  No active bleeding was seen.  He did undergo colonoscopy where bleeding was seen.  PillCam was not suggested at this time however for continued blood loss was suggested in the future.  He was seen by cardiology.  His aspirin was decreased to 81 mg daily enteric-coated.  Amlodipine discontinued.  He was treated for hypertension with metoprolol and hydralazine.  Troponins were elevated but thought to be due to demand ischemia related to severe anemia.  He underwent an echo which showed an ejection fracture 25 to 30%.     Today patient sitting up in bedside chair. Underlying cognitive impairment. Pt pleasantly confused. Recent GI bleed. No further evidence of bleed.  Hemoglobin stable at 10.  He continues on PPI.  Patient was having regular bowel movements.  History of BPH or prostate CA.  Continues on Flomax.  No  urinary retention.  UA UC negative.  Underlying heart failure.  Patient continues on spironolactone and Lasix.  Compensated.  He denies any shortness of breath or chest pain.  No lower extremity edema.  Hypertension.  Blood pressure satisfactory.  Diabetes mellitus type 2 satisfactory control.    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     Dyspnea on exertion 04/01/2022     Symptomatic anemia 04/01/2022     Ecchymosis of forearm 04/01/2022     Acute on chronic systolic heart failure (H) 04/02/2022     NSTEMI -- demand ischemia 04/02/2022     Iron deficiency anemia due to chronic blood loss 04/04/2022     Prostate cancer (H) 04/04/2022     Acute blood loss anemia 04/04/2022     Moderate malnutrition (H) 04/05/2022     Thyroid nodule 04/07/2022     Obstructive sleep apnea syndrome 04/07/2022     Mixed hyperlipidemia 04/07/2022     Mild cognitive disorder 04/07/2022     History of malignant neoplasm of prostate 04/07/2022     History of fall 04/07/2022     Gastroesophageal reflux disease without esophagitis 04/07/2022     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     Gastrointestinal hemorrhage associated with peptic ulcer 04/19/2022     Gastroesophageal reflux disease with esophagitis, unspecified whether hemorrhage 04/19/2022     Urinary retention 04/19/2022     Weight loss 04/19/2022     Resolved Ambulatory Problems     Diagnosis Date Noted     PAVAN (acute kidney injury) (H) 01/21/2018     Tachycardia 01/21/2018     Respiratory infection 01/21/2018     SOB (shortness of breath) 04/01/2022     No Additional Past Medical History       Current Outpatient Medications:      acetaminophen (TYLENOL) 325 MG tablet,  Take 2 tablets (650 mg) by mouth every 4 hours as needed for mild pain or fever, Disp: , Rfl: 0     albuterol (PROVENTIL) (2.5 MG/3ML) 0.083% neb solution, Take 1 vial (2.5 mg) by nebulization every 6 hours as needed for wheezing, Disp: 90 mL, Rfl: 0     aspirin (ASA) 81 MG EC tablet, Take 1 tablet (81 mg) by mouth daily, Disp: , Rfl: 0     docusate sodium (COLACE) 100 MG capsule, Take 100 mg by mouth daily, Disp: , Rfl:      furosemide (LASIX) 20 MG tablet, Take 1 tablet (20 mg) by mouth daily, Disp: , Rfl: 0     glucosamine-chondroitin 500-400 MG CAPS per capsule, Take 3 capsules by mouth daily, Disp: , Rfl:      losartan (COZAAR) 25 MG tablet, Take 1 tablet (25 mg) by mouth daily, Disp: , Rfl: 0     metoprolol succinate ER (TOPROL-XL) 25 MG 24 hr tablet, Take 1 tablet (25 mg) by mouth daily, Disp: , Rfl: 0     multivitamin therapeutic tablet, [MULTIVITAMIN THERAPEUTIC TABLET] Take 1 tablet by mouth daily., Disp: , Rfl:      Omega-3 Fatty Acids (FISH OIL) 1200 MG capsule, Take 1,200 mg by mouth daily, Disp: , Rfl:      pantoprazole (PROTONIX) 40 MG EC tablet, Take 1 tablet (40 mg) by mouth 2 times daily, Disp: , Rfl: 0     rosuvastatin (CRESTOR) 5 MG tablet, Take 1 tablet (5 mg) by mouth every evening, Disp: , Rfl: 0     senna-docusate (SENOKOT-S/PERICOLACE) 8.6-50 MG tablet, Take 1 tablet by mouth 2 times daily, Disp: , Rfl: 0     spironolactone (ALDACTONE) 25 MG tablet, Take 1 tablet (25 mg) by mouth daily, Disp: , Rfl: 0     tamsulosin (FLOMAX) 0.4 MG capsule, Take 0.4 mg by mouth in the morning., Disp: , Rfl:      vitamin D3 (CHOLECALCIFEROL) 50 mcg (2000 units) tablet, Take 1 tablet (50 mcg) by mouth daily, Disp: , Rfl: 0  No Known Allergies    All Meds and Allergies reviewed in the record at the facility and is the most up-to-date    REVIEW OF SYSTEMS:   Review of Systems  No fevers or chills. No headache, lightheadedness or dizziness. No SOB, chest pains or palpitations. Appetite is greatly improved.. No  "nausea, vomiting, constipation or diarrhea.  No signs or symptoms of active bleeding.  No dysuria, frequency, burning or pain with urination.  Denies urinary retention.     PHYSICAL EXAMINATION:  Physical Exam     Vital signs: /70   Pulse 83   Temp (!) 96.7  F (35.9  C)   Resp 20   Ht 1.854 m (6' 1\")   Wt 81.9 kg (180 lb 9.6 oz)   SpO2 96%   BMI 23.83 kg/m    General: Awake, Alert, oriented x1,  follows simple commands, conversant  HEENT: Pink conjunctiva, anicteric sclerae, moist oral mucosa  NECK: Supple  CVS:  S1  S2, without murmur or gallop.   LUNG: Clear to auscultation, No wheezes, rales or rhonci.  BACK: No kyphosis of the thoracic spine  ABDOMEN: Soft, round, nontender to palpation, with positive bowel sounds  EXTREMITIES: Moves both upper and lower extremities with generalized weakness, no pedal edema, no calf tenderness  SKIN: Warm and dry.  Large ecchymotic area over the left rib cage from history of fall.  NEUROLOGIC: Intact, pulses palpable  PSYCHIATRIC: Cognitive impairment noted.      Labs:  All labs reviewed in the nursing home record and Epic   @  Lab Results   Component Value Date    WBC 7.7 04/05/2022     Lab Results   Component Value Date    RBC 3.97 04/05/2022     Lab Results   Component Value Date    HGB 8.5 04/06/2022     Lab Results   Component Value Date    HCT 30.6 04/05/2022     Lab Results   Component Value Date    MCV 77 04/05/2022     Lab Results   Component Value Date    MCH 21.9 04/05/2022     Lab Results   Component Value Date    MCHC 28.4 04/05/2022     Lab Results   Component Value Date    RDW 27.3 04/05/2022     Lab Results   Component Value Date     04/05/2022        @Last Comprehensive Metabolic Panel:  Sodium   Date Value Ref Range Status   04/11/2022 138 136 - 145 mmol/L Final     Potassium   Date Value Ref Range Status   04/11/2022 3.9 3.5 - 5.0 mmol/L Final     Chloride   Date Value Ref Range Status   04/11/2022 103 98 - 107 mmol/L Final     Carbon " Dioxide (CO2)   Date Value Ref Range Status   04/11/2022 23 22 - 31 mmol/L Final     Anion Gap   Date Value Ref Range Status   04/11/2022 12 5 - 18 mmol/L Final     Glucose   Date Value Ref Range Status   04/11/2022 103 70 - 125 mg/dL Final     Urea Nitrogen   Date Value Ref Range Status   04/11/2022 13 8 - 28 mg/dL Final     Creatinine   Date Value Ref Range Status   04/11/2022 0.90 0.70 - 1.30 mg/dL Final     GFR Estimate   Date Value Ref Range Status   04/11/2022 84 >60 mL/min/1.73m2 Final     Comment:     Effective December 21, 2021 eGFRcr in adults is calculated using the 2021 CKD-EPI creatinine equation which includes age and gender (Carlota et al., NE, DOI: 10.1056/JVRPct9600008)   03/17/2021 >60 >60 mL/min/1.73m2 Final     Calcium   Date Value Ref Range Status   04/11/2022 9.2 8.5 - 10.5 mg/dL Final         Assessment/Plan:    ICD-10-CM    1. Gastrointestinal hemorrhage associated with peptic ulcer  K27.4  patient continues on PPI.  No evidence of bleed.  Hemoglobin now 10.4.   2. Gastroesophageal reflux disease with esophagitis, unspecified whether hemorrhage  K21.00  continues on PPI.   3. Essential hypertension  I10  status are controlled.   4. NSTEMI -- demand ischemia  R77.8  denies any chest pain or shortness of breath.   5. Acute on chronic systolic heart failure (H)  I50.23  continue to weigh daily.  Patient continues on Lasix 20 mg daily.  Weight is up however feel this is due to increased appetite.  Follow-up BMP unremarkable.   6. urinary retention   no further urinary retention. PVR today 0.  Continues on Flomax.    Repeat UA trace mucus. UC negative.    7. Type 2 diabetes mellitus without complication, without long-term current use of insulin (H)  E11.9  patient no longer on diabetic medication for the last 6 weeks.  Hemoglobin A1c 5.7.  Continue diet control.           This note has been dictated using voice recognition software. Any grammatical or context distortions are unintentional and  inherent to the software    Electronically signed by: Meseret Nolen, CNP

## 2022-04-27 ENCOUNTER — TRANSITIONAL CARE UNIT VISIT (OUTPATIENT)
Dept: GERIATRICS | Facility: CLINIC | Age: 87
End: 2022-04-27
Payer: COMMERCIAL

## 2022-04-27 VITALS
OXYGEN SATURATION: 95 % | WEIGHT: 181.4 LBS | SYSTOLIC BLOOD PRESSURE: 127 MMHG | DIASTOLIC BLOOD PRESSURE: 60 MMHG | BODY MASS INDEX: 24.04 KG/M2 | HEIGHT: 73 IN | RESPIRATION RATE: 20 BRPM | TEMPERATURE: 97.4 F | HEART RATE: 68 BPM

## 2022-04-27 DIAGNOSIS — E11.9 TYPE 2 DIABETES MELLITUS WITHOUT COMPLICATION, WITHOUT LONG-TERM CURRENT USE OF INSULIN (H): ICD-10-CM

## 2022-04-27 DIAGNOSIS — I10 ESSENTIAL HYPERTENSION: ICD-10-CM

## 2022-04-27 DIAGNOSIS — K27.4 GASTROINTESTINAL HEMORRHAGE ASSOCIATED WITH PEPTIC ULCER: Primary | ICD-10-CM

## 2022-04-27 DIAGNOSIS — K21.00 GASTROESOPHAGEAL REFLUX DISEASE WITH ESOPHAGITIS, UNSPECIFIED WHETHER HEMORRHAGE: ICD-10-CM

## 2022-04-27 DIAGNOSIS — I50.23 ACUTE ON CHRONIC SYSTOLIC HEART FAILURE (H): ICD-10-CM

## 2022-04-27 DIAGNOSIS — R33.9 URINARY RETENTION: ICD-10-CM

## 2022-04-27 DIAGNOSIS — F09 MILD COGNITIVE DISORDER: ICD-10-CM

## 2022-04-27 PROCEDURE — 99316 NF DSCHRG MGMT 30 MIN+: CPT | Performed by: NURSE PRACTITIONER

## 2022-04-28 NOTE — PROGRESS NOTES
[facility] GERIATRIC SERVICES    Code Status:  DNR   Visit Type:   Chief Complaint   Patient presents with     Discharge Summary Nursing Home     Facility:  [facility] ([facility]) [[id number]]           History of Present Illness: Solitario Benjamin is a 86 year old male who I am seeing today for follow-up on the TCU.  Patient recently hospitalized on 4/1/2022 secondary to probable GI from esophagitis/gastritis.  Past medical history includes GERD with large hiatal hernia, gastritis, hypertension, acute on chronic systolic heart failure, diabetes mellitus type 2, prostate CA and weight loss.  Per the hospital records patient admitted with shortness of breath with severe anemia and a 30 pound weight loss with extensive bruising and falls.  He had been off of his diabetic meds for at least 6 weeks.  He had been taking ibuprofen for pain in his joints but had not noticed any blood in his stool.  Hemoglobin 4.6 with MCV of 63.  Patient treated with IV Protonix.  He was transfused for acute anemia and also given iron IV.  He underwent an EGD which showed a large hiatal hernia and esophagitis with gastritis.  No active bleeding was seen.  He did undergo colonoscopy where bleeding was seen.  PillCam was not suggested at this time however for continued blood loss was suggested in the future.  He was seen by cardiology.  His aspirin was decreased to 81 mg daily enteric-coated.  Amlodipine discontinued.  He was treated for hypertension with metoprolol and hydralazine.  Troponins were elevated but thought to be due to demand ischemia related to severe anemia.  He underwent an echo which showed an ejection fracture 25 to 30%.     Today patient sitting up in bedside chair. Underlying cognitive impairment. P denies any pain or discomfort.  Leasantly confused. Recent GI bleed. No further evidence of bleed.  Hemoglobin stable at 10.  He continues on PPI.  History of BPH or prostate CA.  Continues on Flomax.  No urinary retention.  UA  UC negative.  Congestive heart failure.  Patient continues on spironolactone and Lasix.  Compensated.  Edema resolved.  He denies any shortness of breath or chest pain. Hypertension.  Blood pressure satisfactory.  Diabetes mellitus type 2 satisfactory control.    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     Dyspnea on exertion 04/01/2022     Symptomatic anemia 04/01/2022     Ecchymosis of forearm 04/01/2022     Acute on chronic systolic heart failure (H) 04/02/2022     NSTEMI -- demand ischemia 04/02/2022     Iron deficiency anemia due to chronic blood loss 04/04/2022     Prostate cancer (H) 04/04/2022     Acute blood loss anemia 04/04/2022     Moderate malnutrition (H) 04/05/2022     Thyroid nodule 04/07/2022     Obstructive sleep apnea syndrome 04/07/2022     Mixed hyperlipidemia 04/07/2022     Mild cognitive disorder 04/07/2022     History of malignant neoplasm of prostate 04/07/2022     History of fall 04/07/2022     Gastroesophageal reflux disease without esophagitis 04/07/2022     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     Gastrointestinal hemorrhage associated with peptic ulcer 04/19/2022     Gastroesophageal reflux disease with esophagitis, unspecified whether hemorrhage 04/19/2022     Urinary retention 04/19/2022     Weight loss 04/19/2022     Resolved Ambulatory Problems     Diagnosis Date Noted     PAVAN (acute kidney injury) (H) 01/21/2018     Tachycardia 01/21/2018     Respiratory infection 01/21/2018     SOB (shortness of breath) 04/01/2022     No Additional Past Medical History       Current Outpatient Medications:      acetaminophen (TYLENOL) 325 MG tablet, Take 2 tablets (650 mg) by mouth  every 4 hours as needed for mild pain or fever, Disp: , Rfl: 0     albuterol (PROVENTIL) (2.5 MG/3ML) 0.083% neb solution, Take 1 vial (2.5 mg) by nebulization every 6 hours as needed for wheezing, Disp: 90 mL, Rfl: 0     aspirin (ASA) 81 MG EC tablet, Take 1 tablet (81 mg) by mouth daily, Disp: , Rfl: 0     docusate sodium (COLACE) 100 MG capsule, Take 100 mg by mouth daily, Disp: , Rfl:      furosemide (LASIX) 20 MG tablet, Take 1 tablet (20 mg) by mouth daily, Disp: , Rfl: 0     glucosamine-chondroitin 500-400 MG CAPS per capsule, Take 3 capsules by mouth daily, Disp: , Rfl:      losartan (COZAAR) 25 MG tablet, Take 1 tablet (25 mg) by mouth daily, Disp: , Rfl: 0     metoprolol succinate ER (TOPROL-XL) 25 MG 24 hr tablet, Take 1 tablet (25 mg) by mouth daily, Disp: , Rfl: 0     multivitamin therapeutic tablet, [MULTIVITAMIN THERAPEUTIC TABLET] Take 1 tablet by mouth daily., Disp: , Rfl:      Omega-3 Fatty Acids (FISH OIL) 1200 MG capsule, Take 1,200 mg by mouth daily, Disp: , Rfl:      pantoprazole (PROTONIX) 40 MG EC tablet, Take 1 tablet (40 mg) by mouth 2 times daily, Disp: , Rfl: 0     rosuvastatin (CRESTOR) 5 MG tablet, Take 1 tablet (5 mg) by mouth every evening, Disp: , Rfl: 0     senna-docusate (SENOKOT-S/PERICOLACE) 8.6-50 MG tablet, Take 1 tablet by mouth 2 times daily, Disp: , Rfl: 0     spironolactone (ALDACTONE) 25 MG tablet, Take 1 tablet (25 mg) by mouth daily, Disp: , Rfl: 0     tamsulosin (FLOMAX) 0.4 MG capsule, Take 0.4 mg by mouth in the morning., Disp: , Rfl:      vitamin D3 (CHOLECALCIFEROL) 50 mcg (2000 units) tablet, Take 1 tablet (50 mcg) by mouth daily, Disp: , Rfl: 0  No Known Allergies    All Meds and Allergies reviewed in the record at the facility and is the most up-to-date    REVIEW OF SYSTEMS:   Review of Systems  No fevers or chills. No headache, lightheadedness or dizziness. No SOB, chest pains or palpitations. Appetite is greatly improved.. No nausea, vomiting, constipation or  "diarrhea.  No signs or symptoms of active bleeding.  No dysuria, frequency, burning or pain with urination.  Denies urinary retention.     PHYSICAL EXAMINATION:  Physical Exam     Vital signs: /60   Pulse 68   Temp 97.4  F (36.3  C)   Resp 20   Ht 1.854 m (6' 1\")   Wt 82.3 kg (181 lb 6.4 oz)   SpO2 95%   BMI 23.93 kg/m    General: Awake, Alert, oriented x1,  follows simple commands, conversant  HEENT: Pink conjunctiva, anicteric sclerae, moist oral mucosa  NECK: Supple  CVS:  S1  S2, without murmur or gallop.   LUNG: Clear to auscultation, No wheezes, rales or rhonci.  BACK: No kyphosis of the thoracic spine  ABDOMEN: Soft, round, nontender to palpation, with positive bowel sounds  EXTREMITIES: Moves both upper and lower extremities with generalized weakness, no pedal edema, no calf tenderness  SKIN: Warm and dry.  Large ecchymotic area over the left rib cage from history of fall.  NEUROLOGIC: Intact, pulses palpable  PSYCHIATRIC: Cognitive impairment noted.      Labs:  All labs reviewed in the nursing home record and Epic   @  Lab Results   Component Value Date    WBC 7.7 04/05/2022     Lab Results   Component Value Date    RBC 3.97 04/05/2022     Lab Results   Component Value Date    HGB 8.5 04/06/2022     Lab Results   Component Value Date    HCT 30.6 04/05/2022     Lab Results   Component Value Date    MCV 77 04/05/2022     Lab Results   Component Value Date    MCH 21.9 04/05/2022     Lab Results   Component Value Date    MCHC 28.4 04/05/2022     Lab Results   Component Value Date    RDW 27.3 04/05/2022     Lab Results   Component Value Date     04/05/2022        @Last Comprehensive Metabolic Panel:  Sodium   Date Value Ref Range Status   04/11/2022 138 136 - 145 mmol/L Final     Potassium   Date Value Ref Range Status   04/11/2022 3.9 3.5 - 5.0 mmol/L Final     Chloride   Date Value Ref Range Status   04/11/2022 103 98 - 107 mmol/L Final     Carbon Dioxide (CO2)   Date Value Ref Range Status "   04/11/2022 23 22 - 31 mmol/L Final     Anion Gap   Date Value Ref Range Status   04/11/2022 12 5 - 18 mmol/L Final     Glucose   Date Value Ref Range Status   04/11/2022 103 70 - 125 mg/dL Final     Urea Nitrogen   Date Value Ref Range Status   04/11/2022 13 8 - 28 mg/dL Final     Creatinine   Date Value Ref Range Status   04/11/2022 0.90 0.70 - 1.30 mg/dL Final     GFR Estimate   Date Value Ref Range Status   04/11/2022 84 >60 mL/min/1.73m2 Final     Comment:     Effective December 21, 2021 eGFRcr in adults is calculated using the 2021 CKD-EPI creatinine equation which includes age and gender (Carlota et al., NEJ, DOI: 10.1056/EFGGpt7796180)   03/17/2021 >60 >60 mL/min/1.73m2 Final     Calcium   Date Value Ref Range Status   04/11/2022 9.2 8.5 - 10.5 mg/dL Final         Assessment/Plan:    ICD-10-CM    1. Gastrointestinal hemorrhage associated with peptic ulcer  K27.4  patient continues on PPI.  No evidence of bleed.  Hemoglobin now 10.4.   2. Gastroesophageal reflux disease with esophagitis, unspecified whether hemorrhage  K21.00  continues on PPI.   3. Essential hypertension  I10  status are controlled.   4. NSTEMI -- demand ischemia  R77.8  denies any chest pain or shortness of breath.   5. Acute on chronic systolic heart failure (H)  I50.23  continue to weigh daily.  Patient continues on Lasix 20 mg daily.  Weight is up however feel this is due to increased appetite.  Follow-up BMP unremarkable.   6. urinary retention   no further urinary retention. PVR today 0.  Continues on Flomax.    Repeat UA trace mucus. UC negative.    7. Type 2 diabetes mellitus without complication, without long-term current use of insulin (H)  E11.9  patient no longer on diabetic medication for the last 6 weeks.  Hemoglobin A1c 5.7.  Continue diet control.       Okay to DC to [facility] with current meds and treatments.  Home PT, OT, home health aide and RN for medication management.  Follow-up with a primary care provider in 1  week.    DISCHARGE PLAN/FACE TO FACE:  I certify that this patient is under my care and that I, or a nurse practitioner or physician's assistant working with me, had a face-to-face encounter that meets the physician face-to-face encounter requirements with this patient.       I certify that, based on my findings, the following services are medically necessary home health services.    My clinical findings support the need for the above skilled services.    This patient is homebound because: Recent GI bleed.    Total time spent for this visit was 35 minutes for greater than 50% time spent face-to-face with patient reviewing discharge medications, home care services and follow-ups as well as collaborating with nursing staff and .    The patient is, or has been, under my care and I have initiated the establishment of the plan of care. This patient will be followed by a physician who will periodically review the plan of care.      This note has been dictated using voice recognition software. Any grammatical or context distortions are unintentional and inherent to the software    Electronically signed by: Meseret Nolen CNP

## 2022-05-18 ENCOUNTER — NURSING HOME VISIT (OUTPATIENT)
Dept: GERIATRICS | Facility: CLINIC | Age: 87
End: 2022-05-18
Payer: COMMERCIAL

## 2022-05-18 VITALS
WEIGHT: 185 LBS | TEMPERATURE: 97.6 F | OXYGEN SATURATION: 98 % | HEIGHT: 73 IN | HEART RATE: 65 BPM | RESPIRATION RATE: 17 BRPM | BODY MASS INDEX: 24.52 KG/M2 | SYSTOLIC BLOOD PRESSURE: 131 MMHG | DIASTOLIC BLOOD PRESSURE: 61 MMHG

## 2022-05-18 DIAGNOSIS — K27.4 GASTROINTESTINAL HEMORRHAGE ASSOCIATED WITH PEPTIC ULCER: Primary | ICD-10-CM

## 2022-05-18 DIAGNOSIS — I10 ESSENTIAL HYPERTENSION: ICD-10-CM

## 2022-05-18 DIAGNOSIS — I50.23 ACUTE ON CHRONIC SYSTOLIC HEART FAILURE (H): ICD-10-CM

## 2022-05-18 DIAGNOSIS — R33.9 URINARY RETENTION: ICD-10-CM

## 2022-05-18 DIAGNOSIS — E11.9 TYPE 2 DIABETES MELLITUS WITHOUT COMPLICATION, WITHOUT LONG-TERM CURRENT USE OF INSULIN (H): ICD-10-CM

## 2022-05-18 DIAGNOSIS — F09 MILD COGNITIVE DISORDER: ICD-10-CM

## 2022-05-18 PROCEDURE — 99310 SBSQ NF CARE HIGH MDM 45: CPT | Performed by: NURSE PRACTITIONER

## 2022-05-18 NOTE — LETTER
5/18/2022        RE: Solitario Benjamin  [address]  [facility] [address]        [facility]    Code Status:  DNR   Visit Type:   Chief Complaint   Patient presents with     [facility]     LTC Admission     Facility:  [facility] ([facility]) [[id number]]           History of Present Illness: Solitario Benjamin is a 86 year old male who I am seeing today for admit from TCU to long-term care.  Patient recently hospitalized on 4/1/2022 secondary to probable GI from esophagitis/gastritis.  Past medical history includes GERD with large hiatal hernia, gastritis, hypertension, acute on chronic systolic heart failure, diabetes mellitus type 2, prostate CA and weight loss.  Per the hospital records patient admitted with shortness of breath with severe anemia and a 30 pound weight loss with extensive bruising and falls.  He had been off of his diabetic meds for at least 6 weeks.  He had been taking ibuprofen for pain in his joints but had not noticed any blood in his stool.  Hemoglobin 4.6 with MCV of 63.  Patient treated with IV Protonix.  He was transfused for acute anemia and also given iron IV.  He underwent an EGD which showed a large hiatal hernia and esophagitis with gastritis.  No active bleeding was seen.  He did undergo colonoscopy where bleeding was seen.  PillCam was not suggested at this time however for continued blood loss was suggested in the future.  He was seen by cardiology.  His aspirin was decreased to 81 mg daily enteric-coated.  Amlodipine discontinued.  He was treated for hypertension with metoprolol and hydralazine.  Troponins were elevated but thought to be due to demand ischemia related to severe anemia.  He underwent an echo which showed an ejection fracture 25 to 30%.     Today patient sitting up in bedside chair. Underlying cognitive impairment.  Patient no longer able to be cared for at home.  Patient with a history of GERD with esophagitis with recent severe  anemia.  Patient with hiatal hernia.  Patient denies any abdominal pain or discomfort.  No further evidence of bleed.  Hemoglobin stable at 10.  He continues on PPI.  History of BPH/prostate CA with urinary retention.  Continues on Flomax.  Patient voiding adequately.  Congestive heart failure compensated.  Patient denies any shortness of breath or chest pain.  Hypertension.  Blood pressure satisfactory.  Diabetes mellitus type 2 satisfactory control with diet.    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     Dyspnea on exertion 04/01/2022     Symptomatic anemia 04/01/2022     Ecchymosis of forearm 04/01/2022     Acute on chronic systolic heart failure (H) 04/02/2022     NSTEMI -- demand ischemia 04/02/2022     Iron deficiency anemia due to chronic blood loss 04/04/2022     Prostate cancer (H) 04/04/2022     Acute blood loss anemia 04/04/2022     Moderate malnutrition (H) 04/05/2022     Thyroid nodule 04/07/2022     Obstructive sleep apnea syndrome 04/07/2022     Mixed hyperlipidemia 04/07/2022     Mild cognitive disorder 04/07/2022     History of malignant neoplasm of prostate 04/07/2022     History of fall 04/07/2022     Gastroesophageal reflux disease without esophagitis 04/07/2022     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     Gastrointestinal hemorrhage associated with peptic ulcer 04/19/2022     Gastroesophageal reflux disease with esophagitis, unspecified whether hemorrhage 04/19/2022     Urinary retention 04/19/2022     Weight loss 04/19/2022     Resolved Ambulatory Problems     Diagnosis Date Noted     PAVAN (acute kidney injury) (H) 01/21/2018     Tachycardia 01/21/2018     Respiratory infection  01/21/2018     SOB (shortness of breath) 04/01/2022     No Additional Past Medical History       Current Outpatient Medications:      acetaminophen (TYLENOL) 325 MG tablet, Take 2 tablets (650 mg) by mouth every 4 hours as needed for mild pain or fever, Disp: , Rfl: 0     albuterol (PROVENTIL) (2.5 MG/3ML) 0.083% neb solution, Take 1 vial (2.5 mg) by nebulization every 6 hours as needed for wheezing, Disp: 90 mL, Rfl: 0     aspirin (ASA) 81 MG EC tablet, Take 1 tablet (81 mg) by mouth daily, Disp: , Rfl: 0     docusate sodium (COLACE) 100 MG capsule, Take 100 mg by mouth daily, Disp: , Rfl:      furosemide (LASIX) 20 MG tablet, Take 1 tablet (20 mg) by mouth daily, Disp: , Rfl: 0     glucosamine-chondroitin 500-400 MG CAPS per capsule, Take 3 capsules by mouth daily, Disp: , Rfl:      losartan (COZAAR) 25 MG tablet, Take 1 tablet (25 mg) by mouth daily, Disp: , Rfl: 0     metoprolol succinate ER (TOPROL-XL) 25 MG 24 hr tablet, Take 1 tablet (25 mg) by mouth daily, Disp: , Rfl: 0     multivitamin therapeutic tablet, [MULTIVITAMIN THERAPEUTIC TABLET] Take 1 tablet by mouth daily., Disp: , Rfl:      Omega-3 Fatty Acids (FISH OIL) 1200 MG capsule, Take 1,200 mg by mouth daily, Disp: , Rfl:      pantoprazole (PROTONIX) 40 MG EC tablet, Take 1 tablet (40 mg) by mouth 2 times daily, Disp: , Rfl: 0     rosuvastatin (CRESTOR) 5 MG tablet, Take 1 tablet (5 mg) by mouth every evening, Disp: , Rfl: 0     senna-docusate (SENOKOT-S/PERICOLACE) 8.6-50 MG tablet, Take 1 tablet by mouth 2 times daily, Disp: , Rfl: 0     spironolactone (ALDACTONE) 25 MG tablet, Take 1 tablet (25 mg) by mouth daily, Disp: , Rfl: 0     tamsulosin (FLOMAX) 0.4 MG capsule, Take 0.4 mg by mouth in the morning., Disp: , Rfl:      vitamin D3 (CHOLECALCIFEROL) 50 mcg (2000 units) tablet, Take 1 tablet (50 mcg) by mouth daily, Disp: , Rfl: 0  No Known Allergies    All Meds and Allergies reviewed in the record at the facility and is the most  "up-to-date    REVIEW OF SYSTEMS:   Review of Systems  No fevers or chills. No headache, lightheadedness or dizziness. No SOB, chest pains or palpitations. Appetite is greatly improved.. No nausea, vomiting, constipation or diarrhea.  No signs or symptoms of active bleeding.  No dysuria, frequency, burning or pain with urination.  No evidence of urinary retention.    PHYSICAL EXAMINATION:  Physical Exam     Vital signs: /61   Pulse 65   Temp 97.6  F (36.4  C)   Resp 17   Ht 1.854 m (6' 1\")   Wt 83.9 kg (185 lb)   SpO2 98%   BMI 24.41 kg/m    General: Awake, Alert, oriented x1,  follows simple commands, conversant  HEENT: Pink conjunctiva, anicteric sclerae, moist oral mucosa  NECK: Supple  CVS:  S1  S2, without murmur or gallop.   LUNG: Clear to auscultation, No wheezes, rales or rhonci.  BACK: No kyphosis of the thoracic spine  ABDOMEN: Soft, round, nontender to palpation, with positive bowel sounds  EXTREMITIES: Moves both upper and lower extremities with generalized weakness, no pedal edema, no calf tenderness  SKIN: Warm and dry.  Large ecchymotic area over the left rib cage from history of fall.  NEUROLOGIC: Intact, pulses palpable  PSYCHIATRIC: Cognitive impairment noted.      Labs:  All labs reviewed in the nursing home record and Epic   @  Lab Results   Component Value Date    WBC 7.7 04/05/2022     Lab Results   Component Value Date    RBC 3.97 04/05/2022     Lab Results   Component Value Date    HGB 8.5 04/06/2022     Lab Results   Component Value Date    HCT 30.6 04/05/2022     Lab Results   Component Value Date    MCV 77 04/05/2022     Lab Results   Component Value Date    MCH 21.9 04/05/2022     Lab Results   Component Value Date    MCHC 28.4 04/05/2022     Lab Results   Component Value Date    RDW 27.3 04/05/2022     Lab Results   Component Value Date     04/05/2022        @Last Comprehensive Metabolic Panel:  Sodium   Date Value Ref Range Status   04/11/2022 138 136 - 145 mmol/L " Final     Potassium   Date Value Ref Range Status   04/11/2022 3.9 3.5 - 5.0 mmol/L Final     Chloride   Date Value Ref Range Status   04/11/2022 103 98 - 107 mmol/L Final     Carbon Dioxide (CO2)   Date Value Ref Range Status   04/11/2022 23 22 - 31 mmol/L Final     Anion Gap   Date Value Ref Range Status   04/11/2022 12 5 - 18 mmol/L Final     Glucose   Date Value Ref Range Status   04/11/2022 103 70 - 125 mg/dL Final     Urea Nitrogen   Date Value Ref Range Status   04/11/2022 13 8 - 28 mg/dL Final     Creatinine   Date Value Ref Range Status   04/11/2022 0.90 0.70 - 1.30 mg/dL Final     GFR Estimate   Date Value Ref Range Status   04/11/2022 84 >60 mL/min/1.73m2 Final     Comment:     Effective December 21, 2021 eGFRcr in adults is calculated using the 2021 CKD-EPI creatinine equation which includes age and gender (Carlota et al., NEJ, DOI: 10.1056/WUCBaf7253764)   03/17/2021 >60 >60 mL/min/1.73m2 Final     Calcium   Date Value Ref Range Status   04/11/2022 9.2 8.5 - 10.5 mg/dL Final         Assessment/Plan:    ICD-10-CM    1. Gastrointestinal hemorrhage associated with peptic ulcer  K27.4  patient continues on PPI.  No evidence of bleed.  Hemoglobin 10.4.   2. Gastroesophageal reflux disease with esophagitis, unspecified whether hemorrhage  K21.00  continues on PPI.   3. Essential hypertension  I10  status are controlled.   4. NSTEMI -- demand ischemia  R77.8  denies any chest pain or shortness of breath.   5. Acute on chronic systolic heart failure (H)  I50.23  continue to weigh daily.  Patient continues on Lasix 20 mg daily.   6. urinary retention   no further urinary retention. PVR today 0.  Continues on Flomax.     7. Type 2 diabetes mellitus without complication, without long-term current use of insulin (H)  E11.9  patient no longer on diabetic medication for the last 6 weeks.  Hemoglobin A1c 5.7.  Continue diet control.       Follow-up CBC and BMP.    This note has been dictated using voice recognition  software. Any grammatical or context distortions are unintentional and inherent to the software    Electronically signed by: Meseret Nolen CNP           Sincerely,        Meseret Nolen, NP

## 2022-05-19 NOTE — PROGRESS NOTES
[facility]    Code Status:  DNR   Visit Type:   Chief Complaint   Patient presents with     [facility]     LTC Admission     Facility:  [facility] ([facility]) [[id number]]           History of Present Illness: Solitario Benjamin is a 86 year old male who I am seeing today for admit from TCU to long-term care.  Patient recently hospitalized on 4/1/2022 secondary to probable GI from esophagitis/gastritis.  Past medical history includes GERD with large hiatal hernia, gastritis, hypertension, acute on chronic systolic heart failure, diabetes mellitus type 2, prostate CA and weight loss.  Per the hospital records patient admitted with shortness of breath with severe anemia and a 30 pound weight loss with extensive bruising and falls.  He had been off of his diabetic meds for at least 6 weeks.  He had been taking ibuprofen for pain in his joints but had not noticed any blood in his stool.  Hemoglobin 4.6 with MCV of 63.  Patient treated with IV Protonix.  He was transfused for acute anemia and also given iron IV.  He underwent an EGD which showed a large hiatal hernia and esophagitis with gastritis.  No active bleeding was seen.  He did undergo colonoscopy where bleeding was seen.  PillCam was not suggested at this time however for continued blood loss was suggested in the future.  He was seen by cardiology.  His aspirin was decreased to 81 mg daily enteric-coated.  Amlodipine discontinued.  He was treated for hypertension with metoprolol and hydralazine.  Troponins were elevated but thought to be due to demand ischemia related to severe anemia.  He underwent an echo which showed an ejection fracture 25 to 30%.     Today patient sitting up in bedside chair. Underlying cognitive impairment.  Patient no longer able to be cared for at home.  Patient with a history of GERD with esophagitis with recent severe anemia.  Patient with hiatal hernia.  Patient denies any abdominal pain or discomfort.  No further  evidence of bleed.  Hemoglobin stable at 10.  He continues on PPI.  History of BPH/prostate CA with urinary retention.  Continues on Flomax.  Patient voiding adequately.  Congestive heart failure compensated.  Patient denies any shortness of breath or chest pain.  Hypertension.  Blood pressure satisfactory.  Diabetes mellitus type 2 satisfactory control with diet.    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     Dyspnea on exertion 04/01/2022     Symptomatic anemia 04/01/2022     Ecchymosis of forearm 04/01/2022     Acute on chronic systolic heart failure (H) 04/02/2022     NSTEMI -- demand ischemia 04/02/2022     Iron deficiency anemia due to chronic blood loss 04/04/2022     Prostate cancer (H) 04/04/2022     Acute blood loss anemia 04/04/2022     Moderate malnutrition (H) 04/05/2022     Thyroid nodule 04/07/2022     Obstructive sleep apnea syndrome 04/07/2022     Mixed hyperlipidemia 04/07/2022     Mild cognitive disorder 04/07/2022     History of malignant neoplasm of prostate 04/07/2022     History of fall 04/07/2022     Gastroesophageal reflux disease without esophagitis 04/07/2022     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     Gastrointestinal hemorrhage associated with peptic ulcer 04/19/2022     Gastroesophageal reflux disease with esophagitis, unspecified whether hemorrhage 04/19/2022     Urinary retention 04/19/2022     Weight loss 04/19/2022     Resolved Ambulatory Problems     Diagnosis Date Noted     PAVAN (acute kidney injury) (H) 01/21/2018     Tachycardia 01/21/2018     Respiratory infection 01/21/2018     SOB (shortness of breath) 04/01/2022     No Additional Past Medical History        Current Outpatient Medications:      acetaminophen (TYLENOL) 325 MG tablet, Take 2 tablets (650 mg) by mouth every 4 hours as needed for mild pain or fever, Disp: , Rfl: 0     albuterol (PROVENTIL) (2.5 MG/3ML) 0.083% neb solution, Take 1 vial (2.5 mg) by nebulization every 6 hours as needed for wheezing, Disp: 90 mL, Rfl: 0     aspirin (ASA) 81 MG EC tablet, Take 1 tablet (81 mg) by mouth daily, Disp: , Rfl: 0     docusate sodium (COLACE) 100 MG capsule, Take 100 mg by mouth daily, Disp: , Rfl:      furosemide (LASIX) 20 MG tablet, Take 1 tablet (20 mg) by mouth daily, Disp: , Rfl: 0     glucosamine-chondroitin 500-400 MG CAPS per capsule, Take 3 capsules by mouth daily, Disp: , Rfl:      losartan (COZAAR) 25 MG tablet, Take 1 tablet (25 mg) by mouth daily, Disp: , Rfl: 0     metoprolol succinate ER (TOPROL-XL) 25 MG 24 hr tablet, Take 1 tablet (25 mg) by mouth daily, Disp: , Rfl: 0     multivitamin therapeutic tablet, [MULTIVITAMIN THERAPEUTIC TABLET] Take 1 tablet by mouth daily., Disp: , Rfl:      Omega-3 Fatty Acids (FISH OIL) 1200 MG capsule, Take 1,200 mg by mouth daily, Disp: , Rfl:      pantoprazole (PROTONIX) 40 MG EC tablet, Take 1 tablet (40 mg) by mouth 2 times daily, Disp: , Rfl: 0     rosuvastatin (CRESTOR) 5 MG tablet, Take 1 tablet (5 mg) by mouth every evening, Disp: , Rfl: 0     senna-docusate (SENOKOT-S/PERICOLACE) 8.6-50 MG tablet, Take 1 tablet by mouth 2 times daily, Disp: , Rfl: 0     spironolactone (ALDACTONE) 25 MG tablet, Take 1 tablet (25 mg) by mouth daily, Disp: , Rfl: 0     tamsulosin (FLOMAX) 0.4 MG capsule, Take 0.4 mg by mouth in the morning., Disp: , Rfl:      vitamin D3 (CHOLECALCIFEROL) 50 mcg (2000 units) tablet, Take 1 tablet (50 mcg) by mouth daily, Disp: , Rfl: 0  No Known Allergies    All Meds and Allergies reviewed in the record at the facility and is the most up-to-date    REVIEW OF SYSTEMS:   Review of Systems  No fevers or chills. No headache, lightheadedness or  "dizziness. No SOB, chest pains or palpitations. Appetite is greatly improved.. No nausea, vomiting, constipation or diarrhea.  No signs or symptoms of active bleeding.  No dysuria, frequency, burning or pain with urination.  No evidence of urinary retention.    PHYSICAL EXAMINATION:  Physical Exam     Vital signs: /61   Pulse 65   Temp 97.6  F (36.4  C)   Resp 17   Ht 1.854 m (6' 1\")   Wt 83.9 kg (185 lb)   SpO2 98%   BMI 24.41 kg/m    General: Awake, Alert, oriented x1,  follows simple commands, conversant  HEENT: Pink conjunctiva, anicteric sclerae, moist oral mucosa  NECK: Supple  CVS:  S1  S2, without murmur or gallop.   LUNG: Clear to auscultation, No wheezes, rales or rhonci.  BACK: No kyphosis of the thoracic spine  ABDOMEN: Soft, round, nontender to palpation, with positive bowel sounds  EXTREMITIES: Moves both upper and lower extremities with generalized weakness, no pedal edema, no calf tenderness  SKIN: Warm and dry.  Large ecchymotic area over the left rib cage from history of fall.  NEUROLOGIC: Intact, pulses palpable  PSYCHIATRIC: Cognitive impairment noted.      Labs:  All labs reviewed in the nursing home record and Epic   @  Lab Results   Component Value Date    WBC 7.7 04/05/2022     Lab Results   Component Value Date    RBC 3.97 04/05/2022     Lab Results   Component Value Date    HGB 8.5 04/06/2022     Lab Results   Component Value Date    HCT 30.6 04/05/2022     Lab Results   Component Value Date    MCV 77 04/05/2022     Lab Results   Component Value Date    MCH 21.9 04/05/2022     Lab Results   Component Value Date    MCHC 28.4 04/05/2022     Lab Results   Component Value Date    RDW 27.3 04/05/2022     Lab Results   Component Value Date     04/05/2022        @Last Comprehensive Metabolic Panel:  Sodium   Date Value Ref Range Status   04/11/2022 138 136 - 145 mmol/L Final     Potassium   Date Value Ref Range Status   04/11/2022 3.9 3.5 - 5.0 mmol/L Final     Chloride   Date " Value Ref Range Status   04/11/2022 103 98 - 107 mmol/L Final     Carbon Dioxide (CO2)   Date Value Ref Range Status   04/11/2022 23 22 - 31 mmol/L Final     Anion Gap   Date Value Ref Range Status   04/11/2022 12 5 - 18 mmol/L Final     Glucose   Date Value Ref Range Status   04/11/2022 103 70 - 125 mg/dL Final     Urea Nitrogen   Date Value Ref Range Status   04/11/2022 13 8 - 28 mg/dL Final     Creatinine   Date Value Ref Range Status   04/11/2022 0.90 0.70 - 1.30 mg/dL Final     GFR Estimate   Date Value Ref Range Status   04/11/2022 84 >60 mL/min/1.73m2 Final     Comment:     Effective December 21, 2021 eGFRcr in adults is calculated using the 2021 CKD-EPI creatinine equation which includes age and gender (Carlota et al., NE, DOI: 10.1056/UAFGon1348758)   03/17/2021 >60 >60 mL/min/1.73m2 Final     Calcium   Date Value Ref Range Status   04/11/2022 9.2 8.5 - 10.5 mg/dL Final         Assessment/Plan:    ICD-10-CM    1. Gastrointestinal hemorrhage associated with peptic ulcer  K27.4  patient continues on PPI.  No evidence of bleed.  Hemoglobin 10.4.   2. Gastroesophageal reflux disease with esophagitis, unspecified whether hemorrhage  K21.00  continues on PPI.   3. Essential hypertension  I10  status are controlled.   4. NSTEMI -- demand ischemia  R77.8  denies any chest pain or shortness of breath.   5. Acute on chronic systolic heart failure (H)  I50.23  continue to weigh daily.  Patient continues on Lasix 20 mg daily.   6. urinary retention   no further urinary retention. PVR today 0.  Continues on Flomax.     7. Type 2 diabetes mellitus without complication, without long-term current use of insulin (H)  E11.9  patient no longer on diabetic medication for the last 6 weeks.  Hemoglobin A1c 5.7.  Continue diet control.       Follow-up CBC and BMP.    This note has been dictated using voice recognition software. Any grammatical or context distortions are unintentional and inherent to the software    Electronically  signed by: Meseret Nolen, CNP

## 2022-07-05 VITALS
RESPIRATION RATE: 16 BRPM | TEMPERATURE: 98.4 F | DIASTOLIC BLOOD PRESSURE: 64 MMHG | BODY MASS INDEX: 25.67 KG/M2 | SYSTOLIC BLOOD PRESSURE: 138 MMHG | HEART RATE: 58 BPM | OXYGEN SATURATION: 92 % | HEIGHT: 73 IN | WEIGHT: 193.7 LBS

## 2022-07-06 ENCOUNTER — NURSING HOME VISIT (OUTPATIENT)
Dept: GERIATRICS | Facility: CLINIC | Age: 87
End: 2022-07-06
Payer: COMMERCIAL

## 2022-07-06 DIAGNOSIS — I50.23 ACUTE ON CHRONIC SYSTOLIC HEART FAILURE (H): ICD-10-CM

## 2022-07-06 DIAGNOSIS — R33.9 URINARY RETENTION: ICD-10-CM

## 2022-07-06 DIAGNOSIS — K27.4 GASTROINTESTINAL HEMORRHAGE ASSOCIATED WITH PEPTIC ULCER: Primary | ICD-10-CM

## 2022-07-06 DIAGNOSIS — I10 ESSENTIAL HYPERTENSION: ICD-10-CM

## 2022-07-06 DIAGNOSIS — F09 MILD COGNITIVE DISORDER: ICD-10-CM

## 2022-07-06 DIAGNOSIS — E11.9 TYPE 2 DIABETES MELLITUS WITHOUT COMPLICATION, WITHOUT LONG-TERM CURRENT USE OF INSULIN (H): ICD-10-CM

## 2022-07-06 PROCEDURE — 99309 SBSQ NF CARE MODERATE MDM 30: CPT | Performed by: FAMILY MEDICINE

## 2022-07-06 NOTE — PROGRESS NOTES
[facility] GERIATRIC SERVICES    Facility:  [facility] () [[id number]]  Code Status: DNR      CHIEF COMPLAINT/REASON FOR VISIT:  Chief Complaint   Patient presents with     [employer] Regulatory       HISTORY:      HPI: Solitario is a 87 year old male patient is has a history of esophagitis and gastritis which required hospitalization on 4/1/2022.  He does have a large hiatal hernia and also essential hypertension and also chronic systolic heart failure which has been stable.  He does not type 2 diabetes but no blood sugars are here done.  Prostate cancer and weight loss.  He was treated with IV Protonix during last hospitalization and treatment transfuse for acute anemia.  There is no active bleeding seen at this time he did undergo colonoscopy where he did have some bleeding PillCam was not suggested at the time.  Amlodipine was discontinued at this time and he is on metoprolol and hydralazine.  His last echocardiogram showed ejection fraction of 25 to 30%.    Patient claims he never felt better at this time and says he is doing fine.  Staff have no new concerns at this time.    Past Medical History:   Diagnosis Date     Accelerated hypertension 1/21/2018     Acute on chronic systolic heart failure (H) 4/2/2022     Centrilobular emphysema (H) 4/1/2022     Elevated troponin level not due to acute coronary syndrome 4/2/2022     Type 2 diabetes mellitus without complication, without long-term current use of insulin (H) 1/21/2018             Family History   Problem Relation Age of Onset     Myocardial Infarction Mother      Myocardial Infarction Father      Myocardial Infarction Sister      Myocardial Infarction Brother      Sleep Apnea Child      Social History     Socioeconomic History     Marital status:      Number of children: 3   Occupational History     Occupation: Retired     Comment: Grocery store truck delivery   Tobacco Use     Smoking status: Former Smoker     Types: Cigarettes, Cigarettes      Quit date: 1983     Years since quittin.5     Smokeless tobacco: Former User   Substance and Sexual Activity     Alcohol use: No     Drug use: No     Sexual activity: Not Currently         REVIEW OF SYSTEM: Patient denies any pain fevers chills nausea vomit diarrhea change in vision hearing taste smell weakness one-sided chest pain shortness of breath.  The remainder review of systems is negative.      PHYSICAL EXAM: Patient is alert pleasant does not appear to be in acute distress has normocephalic and atraumatic sclera conjunctive is clear oromucosa was moist nasal discharge.  Heart sounds are regular lungs were clear abdomen was soft nontender bowel sounds are positive.  There is no tenderness.  Neurologic exam was baseline and affect was pleasant.        LABS: Reviewed.    Vital; blood pressure 130/64    Pulse 58    Temperature is 98.4    Respiration 16    O2 sats 92%.          ASSESSMENT:   Encounter Diagnoses   Name Primary?     Gastrointestinal hemorrhage associated with peptic ulcer Yes     Urinary retention      Mild cognitive disorder      Type 2 diabetes mellitus without complication, without long-term current use of insulin (H)      Acute on chronic systolic heart failure (H)      Essential hypertension         PLAN: Plan at this time we will continue to monitor above medical problems no new changes to care plan at this time.  Care plan was reviewed and is appropriate.        Electronically signed by: ADALID MCNALLY DO

## 2022-07-06 NOTE — LETTER
7/6/2022        RE: Solitario CAVANAUGH Cassidy  [address]  [facility] [address]        M [facility] GERIATRIC SERVICES    Facility:  [facility] () [[id number]]  Code Status: DNR      CHIEF COMPLAINT/REASON FOR VISIT:  Chief Complaint   Patient presents with     shelter Regulatory       HISTORY:      HPI: Solitario is a 87 year old male patient is has a history of esophagitis and gastritis which required hospitalization on 4/1/2022.  He does have a large hiatal hernia and also essential hypertension and also chronic systolic heart failure which has been stable.  He does not type 2 diabetes but no blood sugars are here done.  Prostate cancer and weight loss.  He was treated with IV Protonix during last hospitalization and treatment transfuse for acute anemia.  There is no active bleeding seen at this time he did undergo colonoscopy where he did have some bleeding PillCam was not suggested at the time.  Amlodipine was discontinued at this time and he is on metoprolol and hydralazine.  His last echocardiogram showed ejection fraction of 25 to 30%.    Patient claims he never felt better at this time and says he is doing fine.  Staff have no new concerns at this time.    Past Medical History:   Diagnosis Date     Accelerated hypertension 1/21/2018     Acute on chronic systolic heart failure (H) 4/2/2022     Centrilobular emphysema (H) 4/1/2022     Elevated troponin level not due to acute coronary syndrome 4/2/2022     Type 2 diabetes mellitus without complication, without long-term current use of insulin (H) 1/21/2018             Family History   Problem Relation Age of Onset     Myocardial Infarction Mother      Myocardial Infarction Father      Myocardial Infarction Sister      Myocardial Infarction Brother      Sleep Apnea Child      Social History     Socioeconomic History     Marital status:      Number of children: 3   Occupational History     Occupation: Retired     Comment: Grocery store  truck delivery   Tobacco Use     Smoking status: Former Smoker     Types: Cigarettes, Cigarettes     Quit date: 1983     Years since quittin.5     Smokeless tobacco: Former User   Substance and Sexual Activity     Alcohol use: No     Drug use: No     Sexual activity: Not Currently         REVIEW OF SYSTEM: Patient denies any pain fevers chills nausea vomit diarrhea change in vision hearing taste smell weakness one-sided chest pain shortness of breath.  The remainder review of systems is negative.      PHYSICAL EXAM: Patient is alert pleasant does not appear to be in acute distress has normocephalic and atraumatic sclera conjunctive is clear oromucosa was moist nasal discharge.  Heart sounds are regular lungs were clear abdomen was soft nontender bowel sounds are positive.  There is no tenderness.  Neurologic exam was baseline and affect was pleasant.        LABS: Reviewed.    Vital; blood pressure 130/64    Pulse 58    Temperature is 98.4    Respiration 16    O2 sats 92%.          ASSESSMENT:   Encounter Diagnoses   Name Primary?     Gastrointestinal hemorrhage associated with peptic ulcer Yes     Urinary retention      Mild cognitive disorder      Type 2 diabetes mellitus without complication, without long-term current use of insulin (H)      Acute on chronic systolic heart failure (H)      Essential hypertension         PLAN: Plan at this time we will continue to monitor above medical problems no new changes to care plan at this time.  Care plan was reviewed and is appropriate.        Electronically signed by: ADALID MCNALLY DO           Sincerely,        ADALID MCNALLY DO

## 2022-08-04 ENCOUNTER — LAB REQUISITION (OUTPATIENT)
Dept: LAB | Facility: CLINIC | Age: 87
End: 2022-08-04
Payer: COMMERCIAL

## 2022-08-04 DIAGNOSIS — D62 ACUTE POSTHEMORRHAGIC ANEMIA: ICD-10-CM

## 2022-08-08 LAB
ANION GAP SERPL CALCULATED.3IONS-SCNC: 15 MMOL/L (ref 7–15)
BUN SERPL-MCNC: 15.6 MG/DL (ref 8–23)
CALCIUM SERPL-MCNC: 9.4 MG/DL (ref 8.8–10.2)
CHLORIDE SERPL-SCNC: 98 MMOL/L (ref 98–107)
CREAT SERPL-MCNC: 1.07 MG/DL (ref 0.67–1.17)
DEPRECATED HCO3 PLAS-SCNC: 22 MMOL/L (ref 22–29)
GFR SERPL CREATININE-BSD FRML MDRD: 68 ML/MIN/1.73M2
GLUCOSE SERPL-MCNC: 200 MG/DL (ref 70–99)
HBA1C MFR BLD: 5.4 %
NT-PROBNP SERPL-MCNC: 247 PG/ML (ref 0–450)
POTASSIUM SERPL-SCNC: 4.5 MMOL/L (ref 3.4–5.3)
SODIUM SERPL-SCNC: 135 MMOL/L (ref 136–145)

## 2022-08-08 PROCEDURE — 36415 COLL VENOUS BLD VENIPUNCTURE: CPT | Mod: ORL | Performed by: NURSE PRACTITIONER

## 2022-08-08 PROCEDURE — 83880 ASSAY OF NATRIURETIC PEPTIDE: CPT | Mod: ORL | Performed by: NURSE PRACTITIONER

## 2022-08-08 PROCEDURE — 80048 BASIC METABOLIC PNL TOTAL CA: CPT | Mod: ORL | Performed by: NURSE PRACTITIONER

## 2022-08-08 PROCEDURE — P9604 ONE-WAY ALLOW PRORATED TRIP: HCPCS | Mod: ORL | Performed by: NURSE PRACTITIONER

## 2022-08-08 PROCEDURE — 83036 HEMOGLOBIN GLYCOSYLATED A1C: CPT | Mod: ORL | Performed by: NURSE PRACTITIONER

## 2022-11-03 VITALS
RESPIRATION RATE: 20 BRPM | TEMPERATURE: 98 F | BODY MASS INDEX: 29.45 KG/M2 | DIASTOLIC BLOOD PRESSURE: 72 MMHG | OXYGEN SATURATION: 97 % | SYSTOLIC BLOOD PRESSURE: 130 MMHG | HEIGHT: 68 IN | HEART RATE: 78 BPM

## 2022-11-04 ENCOUNTER — NURSING HOME VISIT (OUTPATIENT)
Dept: GERIATRICS | Facility: CLINIC | Age: 87
End: 2022-11-04
Payer: COMMERCIAL

## 2022-11-04 DIAGNOSIS — D50.0 IRON DEFICIENCY ANEMIA DUE TO CHRONIC BLOOD LOSS: ICD-10-CM

## 2022-11-04 DIAGNOSIS — K27.4 GASTROINTESTINAL HEMORRHAGE ASSOCIATED WITH PEPTIC ULCER: ICD-10-CM

## 2022-11-04 DIAGNOSIS — I10 ESSENTIAL HYPERTENSION: ICD-10-CM

## 2022-11-04 DIAGNOSIS — K21.00 GASTROESOPHAGEAL REFLUX DISEASE WITH ESOPHAGITIS, UNSPECIFIED WHETHER HEMORRHAGE: ICD-10-CM

## 2022-11-04 DIAGNOSIS — I50.23 ACUTE ON CHRONIC SYSTOLIC HEART FAILURE (H): ICD-10-CM

## 2022-11-04 DIAGNOSIS — F09 MILD COGNITIVE DISORDER: Primary | ICD-10-CM

## 2022-11-04 DIAGNOSIS — R79.89 ELEVATED TROPONIN LEVEL NOT DUE TO ACUTE CORONARY SYNDROME: ICD-10-CM

## 2022-11-04 PROCEDURE — 99309 SBSQ NF CARE MODERATE MDM 30: CPT | Performed by: FAMILY MEDICINE

## 2022-11-04 NOTE — PROGRESS NOTES
[facility] GERIATRIC SERVICES    Facility:  [facility] () [[id number]]  Code Status: DNR      CHIEF COMPLAINT/REASON FOR VISIT:  Chief Complaint   Patient presents with     jail Regulatory       HISTORY:      HPI: Solitario is a 87 year old male who resides here at the [facility] secondary to multiple medical problems.  Past medical history includes large hiatal hernia also essential hypertension chronic systolic heart failure that has been stable at this time.  He does a history of GI bleed but recently no active bleeding at this time and he did undergo colonoscopy did have some bleeding PillCam was not suggested at the time.  Blood pressures are controlled at this time and he does have an ejection fraction 25 to 30% which indicates heart failure with reduced ejection fraction.  (Natruretic peptide was within normal limits.    Patient is lying in his reclining chair he does feel comfortable at this time he has no pain issues at this time and he has no trouble breathing or chest pain.  Staff have no new concerns at this time.  No signs of any bleeding.        Past Medical History:   Diagnosis Date     Accelerated hypertension 1/21/2018     Acute on chronic systolic heart failure (H) 4/2/2022     Centrilobular emphysema (H) 4/1/2022     Elevated troponin level not due to acute coronary syndrome 4/2/2022     Type 2 diabetes mellitus without complication, without long-term current use of insulin (H) 1/21/2018             Family History   Problem Relation Age of Onset     Myocardial Infarction Mother      Myocardial Infarction Father      Myocardial Infarction Sister      Myocardial Infarction Brother      Sleep Apnea Child      Social History     Socioeconomic History     Marital status:      Number of children: 3   Occupational History     Occupation: Retired     Comment: Grocery store truck delivery   Tobacco Use     Smoking status: Former     Types: Cigarettes     Quit date:  1983     Years since quittin.8     Smokeless tobacco: Former   Substance and Sexual Activity     Alcohol use: No     Drug use: No     Sexual activity: Not Currently         REVIEW OF SYSTEM: Patient denies any pain fevers chills nausea vomit diarrhea change in vision hearing taste or smell weakness one-sided the chest pain shortness of breath.  Denies any current shortness stool polyphasia polydipsia polyuria depression or anxiety and the main review of systems is negative.      PHYSICAL EXAM: Patient is alert pleasant does not appear to be in acute distress head is normocephalic and atraumatic sclera conjunctiva was clear oral mucosa moist nasal discharge.  Heart sounds are regular lungs are clear abdomen soft nontender.  Extremity showed trace edema to 1+ pitting edema bilateral.  Neurologic exam was baseline affect was pleasant.        LABS: On 2022 creatinine was 1.07, sodium is 135, potassium 4.5, BUN is 50.6, CO2 is 22, glucose is 200, GFR was 68.  N-terminal pro brain natruretic peptide was 247 with a reference range of 0-450.    Vitals; blood pressure 130/72    Pulse 78    Temperature is 98    Respirations 20    O2 sats 97%.      ASSESSMENT:   Encounter Diagnoses   Name Primary?     Mild cognitive disorder Yes     Acute on chronic systolic heart failure (H)      Gastrointestinal hemorrhage associated with peptic ulcer      NSTEMI -- demand ischemia      Iron deficiency anemia due to chronic blood loss      Essential hypertension      Gastroesophageal reflux disease with esophagitis, unspecified whether hemorrhage         PLAN: Plan at this time we will continue monitor above medical problems no other changes to care plan at this time.  Care plan was reviewed and is appropriate.  Given the fact he does have chronic systolic heart failure and some edema that is mild would recommend on next visit the order brain natruretic peptide as well as basic metabolic profile.  He is currently on Lasix 20 mg  daily and also spironolactone 12.5 mg once in the morning.    I will continue to monitor above medical problems and no other changes to care plan at this time.        Electronically signed by: ADALID MCNALLY DO

## 2022-11-04 NOTE — LETTER
11/4/2022        RE: Solitario Benjamin  [address]  [facility] [address]        M [facility] GERIATRIC SERVICES    Facility:  [facility] () [[id number]]  Code Status: DNR      CHIEF COMPLAINT/REASON FOR VISIT:  Chief Complaint   Patient presents with     FDC Regulatory       HISTORY:      HPI: Solitario is a 87 year old male who resides here at the [facility] secondary to multiple medical problems.  Past medical history includes large hiatal hernia also essential hypertension chronic systolic heart failure that has been stable at this time.  He does a history of GI bleed but recently no active bleeding at this time and he did undergo colonoscopy did have some bleeding PillCam was not suggested at the time.  Blood pressures are controlled at this time and he does have an ejection fraction 25 to 30% which indicates heart failure with reduced ejection fraction.  (Natruretic peptide was within normal limits.    Patient is lying in his reclining chair he does feel comfortable at this time he has no pain issues at this time and he has no trouble breathing or chest pain.  Staff have no new concerns at this time.  No signs of any bleeding.        Past Medical History:   Diagnosis Date     Accelerated hypertension 1/21/2018     Acute on chronic systolic heart failure (H) 4/2/2022     Centrilobular emphysema (H) 4/1/2022     Elevated troponin level not due to acute coronary syndrome 4/2/2022     Type 2 diabetes mellitus without complication, without long-term current use of insulin (H) 1/21/2018             Family History   Problem Relation Age of Onset     Myocardial Infarction Mother      Myocardial Infarction Father      Myocardial Infarction Sister      Myocardial Infarction Brother      Sleep Apnea Child      Social History     Socioeconomic History     Marital status:      Number of children: 3   Occupational History     Occupation: Retired     Comment: Grocery store  truck delivery   Tobacco Use     Smoking status: Former     Types: Cigarettes     Quit date: 1983     Years since quittin.8     Smokeless tobacco: Former   Substance and Sexual Activity     Alcohol use: No     Drug use: No     Sexual activity: Not Currently         REVIEW OF SYSTEM: Patient denies any pain fevers chills nausea vomit diarrhea change in vision hearing taste or smell weakness one-sided the chest pain shortness of breath.  Denies any current shortness stool polyphasia polydipsia polyuria depression or anxiety and the main review of systems is negative.      PHYSICAL EXAM: Patient is alert pleasant does not appear to be in acute distress head is normocephalic and atraumatic sclera conjunctiva was clear oral mucosa moist nasal discharge.  Heart sounds are regular lungs are clear abdomen soft nontender.  Extremity showed trace edema to 1+ pitting edema bilateral.  Neurologic exam was baseline affect was pleasant.        LABS: On 2022 creatinine was 1.07, sodium is 135, potassium 4.5, BUN is 50.6, CO2 is 22, glucose is 200, GFR was 68.  N-terminal pro brain natruretic peptide was 247 with a reference range of 0-450.    Vitals; blood pressure 130/72    Pulse 78    Temperature is 98    Respirations 20    O2 sats 97%.      ASSESSMENT:   Encounter Diagnoses   Name Primary?     Mild cognitive disorder Yes     Acute on chronic systolic heart failure (H)      Gastrointestinal hemorrhage associated with peptic ulcer      NSTEMI -- demand ischemia      Iron deficiency anemia due to chronic blood loss      Essential hypertension      Gastroesophageal reflux disease with esophagitis, unspecified whether hemorrhage         PLAN: Plan at this time we will continue monitor above medical problems no other changes to care plan at this time.  Care plan was reviewed and is appropriate.  Given the fact he does have chronic systolic heart failure and some edema that is mild would recommend on next visit the order  brain natruretic peptide as well as basic metabolic profile.  He is currently on Lasix 20 mg daily and also spironolactone 12.5 mg once in the morning.    I will continue to monitor above medical problems and no other changes to care plan at this time.        Electronically signed by: ADALID MCNALLY DO          Sincerely,        ADALID MCNALLY DO

## 2022-11-15 ENCOUNTER — LAB REQUISITION (OUTPATIENT)
Dept: LAB | Facility: CLINIC | Age: 87
End: 2022-11-15
Payer: COMMERCIAL

## 2022-11-15 DIAGNOSIS — N39.0 URINARY TRACT INFECTION, SITE NOT SPECIFIED: ICD-10-CM

## 2022-11-15 LAB
ALBUMIN UR-MCNC: NEGATIVE MG/DL
APPEARANCE UR: CLEAR
BILIRUB UR QL STRIP: NEGATIVE
COLOR UR AUTO: ABNORMAL
GLUCOSE UR STRIP-MCNC: >=1000 MG/DL
HGB UR QL STRIP: NEGATIVE
KETONES UR STRIP-MCNC: NEGATIVE MG/DL
LEUKOCYTE ESTERASE UR QL STRIP: NEGATIVE
MUCOUS THREADS #/AREA URNS LPF: PRESENT /LPF
NITRATE UR QL: NEGATIVE
PH UR STRIP: 5 [PH] (ref 5–7)
RBC URINE: 0 /HPF
SP GR UR STRIP: 1.02 (ref 1–1.03)
UROBILINOGEN UR STRIP-MCNC: NORMAL MG/DL
WBC URINE: 0 /HPF

## 2022-11-15 PROCEDURE — 87086 URINE CULTURE/COLONY COUNT: CPT | Mod: ORL | Performed by: NURSE PRACTITIONER

## 2022-11-15 PROCEDURE — 81001 URINALYSIS AUTO W/SCOPE: CPT | Mod: ORL | Performed by: NURSE PRACTITIONER

## 2022-11-17 LAB — BACTERIA UR CULT: NORMAL

## 2022-11-30 ENCOUNTER — NURSING HOME VISIT (OUTPATIENT)
Dept: GERIATRICS | Facility: CLINIC | Age: 87
End: 2022-11-30
Payer: COMMERCIAL

## 2022-11-30 VITALS
HEIGHT: 68 IN | DIASTOLIC BLOOD PRESSURE: 78 MMHG | SYSTOLIC BLOOD PRESSURE: 129 MMHG | TEMPERATURE: 97.8 F | HEART RATE: 75 BPM | OXYGEN SATURATION: 92 % | RESPIRATION RATE: 17 BRPM | WEIGHT: 219 LBS | BODY MASS INDEX: 33.19 KG/M2

## 2022-11-30 DIAGNOSIS — F09 MILD COGNITIVE DISORDER: Primary | ICD-10-CM

## 2022-11-30 DIAGNOSIS — Z72.89 INAPPROPRIATE SEXUAL BEHAVIOR: ICD-10-CM

## 2022-11-30 PROBLEM — E44.0 MODERATE MALNUTRITION (H): Status: RESOLVED | Noted: 2022-04-05 | Resolved: 2022-11-30

## 2022-11-30 PROCEDURE — 99309 SBSQ NF CARE MODERATE MDM 30: CPT | Performed by: NURSE PRACTITIONER

## 2022-11-30 RX ORDER — SERTRALINE HYDROCHLORIDE 25 MG/1
25 TABLET, FILM COATED ORAL DAILY
COMMUNITY
End: 2023-11-29

## 2022-11-30 NOTE — LETTER
11/30/2022        RE: Solitario Benjamin  [address]  [facility] [address]        [facility]    Code Status:  DNR   Visit Type:   Chief Complaint   Patient presents with     Nursing Home Acute     LTC Follow up     Facility:  [facility] () [[id number]]           History of Present Illness: Solitario Benjamin is a 86 year old male who I am seeing today request the nursing staff.  Patient  hospitalized on 4/1/2022 secondary to probable GI from esophagitis/gastritis.  Past medical history includes GERD with large hiatal hernia, gastritis, hypertension, acute on chronic systolic heart failure, diabetes mellitus type 2, prostate CA and weight loss.  Per the hospital records patient admitted with shortness of breath with severe anemia and a 30 pound weight loss with extensive bruising and falls.  He had been off of his diabetic meds for at least 6 weeks.  He had been taking ibuprofen for pain in his joints but had not noticed any blood in his stool.  Hemoglobin 4.6 with MCV of 63.  Patient treated with IV Protonix.  He was transfused for acute anemia and also given iron IV.  He underwent an EGD which showed a large hiatal hernia and esophagitis with gastritis.  No active bleeding was seen.  He did undergo colonoscopy where bleeding was seen.  PillCam was not suggested at this time however for continued blood loss was suggested in the future.  He was seen by cardiology.  His aspirin was decreased to 81 mg daily enteric-coated.  Amlodipine discontinued.  He was treated for hypertension with metoprolol and hydralazine.  Troponins were elevated but thought to be due to demand ischemia related to severe anemia.  He underwent an echo which showed an ejection fracture 25 to 30%.     Today patient sitting up in bedside chair. Underlying cognitive impairment.  Nursing staff reporting increased sexual inappropriate behavior.  Patient masturbates continuously throughout the day.  He has began to  make sexual comments to both staff and other residents.  When questioning him today about this he has no recollection of this.  I did find him upon entering his room masturbating in his bedside chair.  He was treated earlier in November for UTI.  He has a history of BPH/prostate CA with history of urinary retention.  He is on Flomax.      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     Dyspnea on exertion 04/01/2022     Symptomatic anemia 04/01/2022     Ecchymosis of forearm 04/01/2022     Acute on chronic systolic heart failure (H) 04/02/2022     NSTEMI -- demand ischemia 04/02/2022     Iron deficiency anemia due to chronic blood loss 04/04/2022     Prostate cancer (H) 04/04/2022     Acute blood loss anemia 04/04/2022     Thyroid nodule 04/07/2022     Obstructive sleep apnea syndrome 04/07/2022     Mixed hyperlipidemia 04/07/2022     Mild cognitive disorder 04/07/2022     History of malignant neoplasm of prostate 04/07/2022     History of fall 04/07/2022     Gastroesophageal reflux disease without esophagitis 04/07/2022     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     Gastrointestinal hemorrhage associated with peptic ulcer 04/19/2022     Gastroesophageal reflux disease with esophagitis, unspecified whether hemorrhage 04/19/2022     Urinary retention 04/19/2022     Weight loss 04/19/2022     Resolved Ambulatory Problems     Diagnosis Date Noted     PAVAN (acute kidney injury) (H) 01/21/2018     Tachycardia 01/21/2018     Respiratory infection 01/21/2018     SOB (shortness of breath) 04/01/2022     Moderate malnutrition (H) 04/05/2022     No Additional Past Medical History       Current Outpatient Medications:       sertraline (ZOLOFT) 25 MG tablet, Take 25 mg by mouth daily, Disp: , Rfl:      acetaminophen (TYLENOL) 325 MG tablet, Take 2 tablets (650 mg) by mouth every 4 hours as needed for mild pain or fever, Disp: , Rfl: 0     albuterol (PROVENTIL) (2.5 MG/3ML) 0.083% neb solution, Take 1 vial (2.5 mg) by nebulization every 6 hours as needed for wheezing, Disp: 90 mL, Rfl: 0     aspirin (ASA) 81 MG EC tablet, Take 1 tablet (81 mg) by mouth daily, Disp: , Rfl: 0     docusate sodium (COLACE) 100 MG capsule, Take 100 mg by mouth daily, Disp: , Rfl:      furosemide (LASIX) 20 MG tablet, Take 1 tablet (20 mg) by mouth daily, Disp: , Rfl: 0     glucosamine-chondroitin 500-400 MG CAPS per capsule, Take 3 capsules by mouth daily, Disp: , Rfl:      losartan (COZAAR) 25 MG tablet, Take 1 tablet (25 mg) by mouth daily, Disp: , Rfl: 0     metoprolol succinate ER (TOPROL-XL) 25 MG 24 hr tablet, Take 1 tablet (25 mg) by mouth daily, Disp: , Rfl: 0     multivitamin therapeutic tablet, [MULTIVITAMIN THERAPEUTIC TABLET] Take 1 tablet by mouth daily., Disp: , Rfl:      Omega-3 Fatty Acids (FISH OIL) 1200 MG capsule, Take 1,200 mg by mouth daily, Disp: , Rfl:      pantoprazole (PROTONIX) 40 MG EC tablet, Take 1 tablet (40 mg) by mouth 2 times daily, Disp: , Rfl: 0     rosuvastatin (CRESTOR) 5 MG tablet, Take 1 tablet (5 mg) by mouth every evening, Disp: , Rfl: 0     senna-docusate (SENOKOT-S/PERICOLACE) 8.6-50 MG tablet, Take 1 tablet by mouth 2 times daily, Disp: , Rfl: 0     spironolactone (ALDACTONE) 25 MG tablet, Take 1 tablet (25 mg) by mouth daily, Disp: , Rfl: 0     tamsulosin (FLOMAX) 0.4 MG capsule, Take 0.4 mg by mouth in the morning., Disp: , Rfl:      vitamin D3 (CHOLECALCIFEROL) 50 mcg (2000 units) tablet, Take 1 tablet (50 mcg) by mouth daily, Disp: , Rfl: 0  No Known Allergies    All Meds and Allergies reviewed in the record at the facility and is the most up-to-date    REVIEW OF SYSTEMS:   Review of Systems  Seven-point  "review of systems reviewed.  Pertinent positives in HPI.    PHYSICAL EXAMINATION:  Physical Exam     Vital signs: /78   Pulse 75   Temp 97.8  F (36.6  C)   Resp 17   Ht 1.727 m (5' 8\")   Wt 99.3 kg (219 lb)   SpO2 92%   BMI 33.30 kg/m    General: Awake, Alert, oriented x1, sitting up in bedside chair, follows simple commands  HEENT: Pink conjunctiva,  moist oral mucosa  NECK: Supple  BACK: No kyphosis of the thoracic spine  ABDOMEN: Soft, round.   EXTREMITIES: Moves both upper and lower extremities with generalized weakness, no pedal edema, no calf tenderness  NEUROLOGIC: Intact, pulses palpable  PSYCHIATRIC: Cognitive impairment noted.      Labs:  All labs reviewed in the nursing home record and Epic   @  Lab Results   Component Value Date    WBC 7.7 04/05/2022     Lab Results   Component Value Date    RBC 3.97 04/05/2022     Lab Results   Component Value Date    HGB 8.5 04/06/2022     Lab Results   Component Value Date    HCT 30.6 04/05/2022     Lab Results   Component Value Date    MCV 77 04/05/2022     Lab Results   Component Value Date    MCH 21.9 04/05/2022     Lab Results   Component Value Date    MCHC 28.4 04/05/2022     Lab Results   Component Value Date    RDW 27.3 04/05/2022     Lab Results   Component Value Date     04/05/2022        @Last Comprehensive Metabolic Panel:  Sodium   Date Value Ref Range Status   08/08/2022 135 (L) 136 - 145 mmol/L Final     Potassium   Date Value Ref Range Status   08/08/2022 4.5 3.4 - 5.3 mmol/L Final   04/11/2022 3.9 3.5 - 5.0 mmol/L Final     Chloride   Date Value Ref Range Status   08/08/2022 98 98 - 107 mmol/L Final   04/11/2022 103 98 - 107 mmol/L Final     Carbon Dioxide (CO2)   Date Value Ref Range Status   08/08/2022 22 22 - 29 mmol/L Final   04/11/2022 23 22 - 31 mmol/L Final     Anion Gap   Date Value Ref Range Status   08/08/2022 15 7 - 15 mmol/L Final   04/11/2022 12 5 - 18 mmol/L Final     Glucose   Date Value Ref Range Status   08/08/2022 " 200 (H) 70 - 99 mg/dL Final   04/11/2022 103 70 - 125 mg/dL Final     Urea Nitrogen   Date Value Ref Range Status   08/08/2022 15.6 8.0 - 23.0 mg/dL Final   04/11/2022 13 8 - 28 mg/dL Final     Creatinine   Date Value Ref Range Status   08/08/2022 1.07 0.67 - 1.17 mg/dL Final     GFR Estimate   Date Value Ref Range Status   08/08/2022 68 >60 mL/min/1.73m2 Final     Comment:     Effective December 21, 2021 eGFRcr in adults is calculated using the 2021 CKD-EPI creatinine equation which includes age and gender (Carlota et al., NEJ, DOI: 10.1056/TITJtl2122955)   03/17/2021 >60 >60 mL/min/1.73m2 Final     Calcium   Date Value Ref Range Status   08/08/2022 9.4 8.8 - 10.2 mg/dL Final         Assessment/Plan:      ICD-10-CM    1. Mild cognitive disorder  F09  mood stable.      2. Inappropriate sexual behavior  Z72.89  Start Zoloft 25 mg p.o. every morning to suppress libido.  Offered patient privacy for masturbation.  Excuse patient from public areas when attempting to masturbate and or make inappropriate comments.        This note has been dictated using voice recognition software. Any grammatical or context distortions are unintentional and inherent to the software    Electronically signed by: Meseret Nolen CNP           Sincerely,        Meseret Nolen, GLADIS

## 2022-12-01 NOTE — PROGRESS NOTES
[facility] GERIATRIC SERVICES    Code Status:  DNR   Visit Type:   Chief Complaint   Patient presents with     Nursing Home Acute     LTC Follow up     Facility:  [facility] () [[id number]]           History of Present Illness: Solitario Benjamin is a 86 year old male who I am seeing today request the nursing staff.  Patient  hospitalized on 4/1/2022 secondary to probable GI from esophagitis/gastritis.  Past medical history includes GERD with large hiatal hernia, gastritis, hypertension, acute on chronic systolic heart failure, diabetes mellitus type 2, prostate CA and weight loss.  Per the hospital records patient admitted with shortness of breath with severe anemia and a 30 pound weight loss with extensive bruising and falls.  He had been off of his diabetic meds for at least 6 weeks.  He had been taking ibuprofen for pain in his joints but had not noticed any blood in his stool.  Hemoglobin 4.6 with MCV of 63.  Patient treated with IV Protonix.  He was transfused for acute anemia and also given iron IV.  He underwent an EGD which showed a large hiatal hernia and esophagitis with gastritis.  No active bleeding was seen.  He did undergo colonoscopy where bleeding was seen.  PillCam was not suggested at this time however for continued blood loss was suggested in the future.  He was seen by cardiology.  His aspirin was decreased to 81 mg daily enteric-coated.  Amlodipine discontinued.  He was treated for hypertension with metoprolol and hydralazine.  Troponins were elevated but thought to be due to demand ischemia related to severe anemia.  He underwent an echo which showed an ejection fracture 25 to 30%.     Today patient sitting up in bedside chair. Underlying cognitive impairment.  Nursing staff reporting increased sexual inappropriate behavior.  Patient masturbates continuously throughout the day.  He has began to make sexual comments to both staff and other residents.  When questioning him today about this  he has no recollection of this.  I did find him upon entering his room masturbating in his bedside chair.  He was treated earlier in November for UTI.  He has a history of BPH/prostate CA with history of urinary retention.  He is on Flomax.      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     Dyspnea on exertion 04/01/2022     Symptomatic anemia 04/01/2022     Ecchymosis of forearm 04/01/2022     Acute on chronic systolic heart failure (H) 04/02/2022     NSTEMI -- demand ischemia 04/02/2022     Iron deficiency anemia due to chronic blood loss 04/04/2022     Prostate cancer (H) 04/04/2022     Acute blood loss anemia 04/04/2022     Thyroid nodule 04/07/2022     Obstructive sleep apnea syndrome 04/07/2022     Mixed hyperlipidemia 04/07/2022     Mild cognitive disorder 04/07/2022     History of malignant neoplasm of prostate 04/07/2022     History of fall 04/07/2022     Gastroesophageal reflux disease without esophagitis 04/07/2022     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     Gastrointestinal hemorrhage associated with peptic ulcer 04/19/2022     Gastroesophageal reflux disease with esophagitis, unspecified whether hemorrhage 04/19/2022     Urinary retention 04/19/2022     Weight loss 04/19/2022     Resolved Ambulatory Problems     Diagnosis Date Noted     PAVAN (acute kidney injury) (H) 01/21/2018     Tachycardia 01/21/2018     Respiratory infection 01/21/2018     SOB (shortness of breath) 04/01/2022     Moderate malnutrition (H) 04/05/2022     No Additional Past Medical History       Current Outpatient Medications:      sertraline (ZOLOFT) 25 MG tablet, Take 25 mg by mouth daily, Disp: , Rfl:       acetaminophen (TYLENOL) 325 MG tablet, Take 2 tablets (650 mg) by mouth every 4 hours as needed for mild pain or fever, Disp: , Rfl: 0     albuterol (PROVENTIL) (2.5 MG/3ML) 0.083% neb solution, Take 1 vial (2.5 mg) by nebulization every 6 hours as needed for wheezing, Disp: 90 mL, Rfl: 0     aspirin (ASA) 81 MG EC tablet, Take 1 tablet (81 mg) by mouth daily, Disp: , Rfl: 0     docusate sodium (COLACE) 100 MG capsule, Take 100 mg by mouth daily, Disp: , Rfl:      furosemide (LASIX) 20 MG tablet, Take 1 tablet (20 mg) by mouth daily, Disp: , Rfl: 0     glucosamine-chondroitin 500-400 MG CAPS per capsule, Take 3 capsules by mouth daily, Disp: , Rfl:      losartan (COZAAR) 25 MG tablet, Take 1 tablet (25 mg) by mouth daily, Disp: , Rfl: 0     metoprolol succinate ER (TOPROL-XL) 25 MG 24 hr tablet, Take 1 tablet (25 mg) by mouth daily, Disp: , Rfl: 0     multivitamin therapeutic tablet, [MULTIVITAMIN THERAPEUTIC TABLET] Take 1 tablet by mouth daily., Disp: , Rfl:      Omega-3 Fatty Acids (FISH OIL) 1200 MG capsule, Take 1,200 mg by mouth daily, Disp: , Rfl:      pantoprazole (PROTONIX) 40 MG EC tablet, Take 1 tablet (40 mg) by mouth 2 times daily, Disp: , Rfl: 0     rosuvastatin (CRESTOR) 5 MG tablet, Take 1 tablet (5 mg) by mouth every evening, Disp: , Rfl: 0     senna-docusate (SENOKOT-S/PERICOLACE) 8.6-50 MG tablet, Take 1 tablet by mouth 2 times daily, Disp: , Rfl: 0     spironolactone (ALDACTONE) 25 MG tablet, Take 1 tablet (25 mg) by mouth daily, Disp: , Rfl: 0     tamsulosin (FLOMAX) 0.4 MG capsule, Take 0.4 mg by mouth in the morning., Disp: , Rfl:      vitamin D3 (CHOLECALCIFEROL) 50 mcg (2000 units) tablet, Take 1 tablet (50 mcg) by mouth daily, Disp: , Rfl: 0  No Known Allergies    All Meds and Allergies reviewed in the record at the facility and is the most up-to-date    REVIEW OF SYSTEMS:   Review of Systems  Seven-point review of systems reviewed.  Pertinent positives in HPI.    PHYSICAL  "EXAMINATION:  Physical Exam     Vital signs: /78   Pulse 75   Temp 97.8  F (36.6  C)   Resp 17   Ht 1.727 m (5' 8\")   Wt 99.3 kg (219 lb)   SpO2 92%   BMI 33.30 kg/m    General: Awake, Alert, oriented x1, sitting up in bedside chair, follows simple commands  HEENT: Pink conjunctiva,  moist oral mucosa  NECK: Supple  BACK: No kyphosis of the thoracic spine  ABDOMEN: Soft, round.   EXTREMITIES: Moves both upper and lower extremities with generalized weakness, no pedal edema, no calf tenderness  NEUROLOGIC: Intact, pulses palpable  PSYCHIATRIC: Cognitive impairment noted.      Labs:  All labs reviewed in the nursing home record and Epic   @  Lab Results   Component Value Date    WBC 7.7 04/05/2022     Lab Results   Component Value Date    RBC 3.97 04/05/2022     Lab Results   Component Value Date    HGB 8.5 04/06/2022     Lab Results   Component Value Date    HCT 30.6 04/05/2022     Lab Results   Component Value Date    MCV 77 04/05/2022     Lab Results   Component Value Date    MCH 21.9 04/05/2022     Lab Results   Component Value Date    MCHC 28.4 04/05/2022     Lab Results   Component Value Date    RDW 27.3 04/05/2022     Lab Results   Component Value Date     04/05/2022        @Last Comprehensive Metabolic Panel:  Sodium   Date Value Ref Range Status   08/08/2022 135 (L) 136 - 145 mmol/L Final     Potassium   Date Value Ref Range Status   08/08/2022 4.5 3.4 - 5.3 mmol/L Final   04/11/2022 3.9 3.5 - 5.0 mmol/L Final     Chloride   Date Value Ref Range Status   08/08/2022 98 98 - 107 mmol/L Final   04/11/2022 103 98 - 107 mmol/L Final     Carbon Dioxide (CO2)   Date Value Ref Range Status   08/08/2022 22 22 - 29 mmol/L Final   04/11/2022 23 22 - 31 mmol/L Final     Anion Gap   Date Value Ref Range Status   08/08/2022 15 7 - 15 mmol/L Final   04/11/2022 12 5 - 18 mmol/L Final     Glucose   Date Value Ref Range Status   08/08/2022 200 (H) 70 - 99 mg/dL Final   04/11/2022 103 70 - 125 mg/dL Final "     Urea Nitrogen   Date Value Ref Range Status   08/08/2022 15.6 8.0 - 23.0 mg/dL Final   04/11/2022 13 8 - 28 mg/dL Final     Creatinine   Date Value Ref Range Status   08/08/2022 1.07 0.67 - 1.17 mg/dL Final     GFR Estimate   Date Value Ref Range Status   08/08/2022 68 >60 mL/min/1.73m2 Final     Comment:     Effective December 21, 2021 eGFRcr in adults is calculated using the 2021 CKD-EPI creatinine equation which includes age and gender (Carlota et al., NEJM, DOI: 10.1056/VMPPhh8121826)   03/17/2021 >60 >60 mL/min/1.73m2 Final     Calcium   Date Value Ref Range Status   08/08/2022 9.4 8.8 - 10.2 mg/dL Final         Assessment/Plan:      ICD-10-CM    1. Mild cognitive disorder  F09  mood stable.      2. Inappropriate sexual behavior  Z72.89  Start Zoloft 25 mg p.o. every morning to suppress libido.  Offered patient privacy for masturbation.  Excuse patient from public areas when attempting to masturbate and or make inappropriate comments.        This note has been dictated using voice recognition software. Any grammatical or context distortions are unintentional and inherent to the software    Electronically signed by: Meseret Nolen CNP

## 2023-01-18 ENCOUNTER — NURSING HOME VISIT (OUTPATIENT)
Dept: GERIATRICS | Facility: CLINIC | Age: 88
End: 2023-01-18
Payer: COMMERCIAL

## 2023-01-18 VITALS
WEIGHT: 215.1 LBS | OXYGEN SATURATION: 96 % | RESPIRATION RATE: 18 BRPM | BODY MASS INDEX: 32.6 KG/M2 | SYSTOLIC BLOOD PRESSURE: 139 MMHG | TEMPERATURE: 98 F | HEART RATE: 68 BPM | HEIGHT: 68 IN | DIASTOLIC BLOOD PRESSURE: 70 MMHG

## 2023-01-18 DIAGNOSIS — K21.00 GASTROESOPHAGEAL REFLUX DISEASE WITH ESOPHAGITIS, UNSPECIFIED WHETHER HEMORRHAGE: ICD-10-CM

## 2023-01-18 DIAGNOSIS — E11.9 TYPE 2 DIABETES MELLITUS WITHOUT COMPLICATION, WITHOUT LONG-TERM CURRENT USE OF INSULIN (H): ICD-10-CM

## 2023-01-18 DIAGNOSIS — I50.23 ACUTE ON CHRONIC SYSTOLIC HEART FAILURE (H): ICD-10-CM

## 2023-01-18 DIAGNOSIS — F09 MILD COGNITIVE DISORDER: ICD-10-CM

## 2023-01-18 DIAGNOSIS — C61 PROSTATE CANCER (H): ICD-10-CM

## 2023-01-18 DIAGNOSIS — D50.0 IRON DEFICIENCY ANEMIA DUE TO CHRONIC BLOOD LOSS: ICD-10-CM

## 2023-01-18 DIAGNOSIS — J43.2 CENTRILOBULAR EMPHYSEMA (H): ICD-10-CM

## 2023-01-18 DIAGNOSIS — I10 ESSENTIAL HYPERTENSION: Primary | ICD-10-CM

## 2023-01-18 PROCEDURE — 99309 SBSQ NF CARE MODERATE MDM 30: CPT | Performed by: NURSE PRACTITIONER

## 2023-01-18 NOTE — LETTER
1/18/2023        RE: Solitario Benjamin  [address]  [facility] [address]        [facility]    Code Status:  DNR   Visit Type:   Chief Complaint   Patient presents with     assisted Regulatory     Facility:  [facility] () [[id number]]           History of Present Illness: Solitario Benjamin is a 86 year old male who I am seeing today for regulatory visit.  Patient  hospitalized on 4/1/2022 secondary to probable GI from esophagitis/gastritis.  Past medical history includes GERD with large hiatal hernia, gastritis, hypertension, acute on chronic systolic heart failure, diabetes mellitus type 2, prostate CA and weight loss.    Today patient sitting up in wheelchair.  He has underlying cognitive impairment.  He is just returned from a music event.  He denies any shortness of breath or chest pain.  No evidence of bleed.  GERD.  On PPI.  No inappropriate sexual behavior reported today.  He continues on sertraline to decrease libido.  History of BPH/prostate CA with history of urinary retention.  Continues on Flomax.  Hypertension.  Blood pressure satisfactory.  Heart failure compensated.  Diabetes mellitus type 2 satisfactory.    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     Dyspnea on exertion 04/01/2022     Symptomatic anemia 04/01/2022     Ecchymosis of forearm 04/01/2022     Acute on chronic systolic heart failure (H) 04/02/2022     NSTEMI -- demand ischemia 04/02/2022     Iron deficiency anemia due to chronic blood loss 04/04/2022     Prostate cancer (H) 04/04/2022     Acute blood loss anemia 04/04/2022     Thyroid nodule 04/07/2022     Obstructive sleep apnea  syndrome 04/07/2022     Mixed hyperlipidemia 04/07/2022     Mild cognitive disorder 04/07/2022     History of malignant neoplasm of prostate 04/07/2022     History of fall 04/07/2022     Gastroesophageal reflux disease without esophagitis 04/07/2022     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     Gastrointestinal hemorrhage associated with peptic ulcer 04/19/2022     Gastroesophageal reflux disease with esophagitis, unspecified whether hemorrhage 04/19/2022     Urinary retention 04/19/2022     Weight loss 04/19/2022     Resolved Ambulatory Problems     Diagnosis Date Noted     PAVAN (acute kidney injury) (H) 01/21/2018     Tachycardia 01/21/2018     Respiratory infection 01/21/2018     SOB (shortness of breath) 04/01/2022     Moderate malnutrition (H) 04/05/2022     No Additional Past Medical History       Current Outpatient Medications:      acetaminophen (TYLENOL) 325 MG tablet, Take 2 tablets (650 mg) by mouth every 4 hours as needed for mild pain or fever, Disp: , Rfl: 0     albuterol (PROVENTIL) (2.5 MG/3ML) 0.083% neb solution, Take 1 vial (2.5 mg) by nebulization every 6 hours as needed for wheezing, Disp: 90 mL, Rfl: 0     aspirin (ASA) 81 MG EC tablet, Take 1 tablet (81 mg) by mouth daily, Disp: , Rfl: 0     docusate sodium (COLACE) 100 MG capsule, Take 100 mg by mouth daily, Disp: , Rfl:      furosemide (LASIX) 20 MG tablet, Take 1 tablet (20 mg) by mouth daily, Disp: , Rfl: 0     glucosamine-chondroitin 500-400 MG CAPS per capsule, Take 3 capsules by mouth daily, Disp: , Rfl:      losartan (COZAAR) 25 MG tablet, Take 1 tablet (25 mg) by mouth daily, Disp: , Rfl: 0     metoprolol succinate ER (TOPROL-XL) 25 MG 24 hr tablet, Take 1 tablet (25 mg) by mouth daily, Disp: , Rfl: 0     multivitamin therapeutic tablet, [MULTIVITAMIN THERAPEUTIC TABLET] Take 1 tablet by mouth daily., Disp: , Rfl:      Omega-3 Fatty Acids (FISH OIL) 1200 MG capsule, Take 1,200 mg by mouth daily, Disp: , Rfl:      pantoprazole (PROTONIX)  "40 MG EC tablet, Take 1 tablet (40 mg) by mouth 2 times daily, Disp: , Rfl: 0     rosuvastatin (CRESTOR) 5 MG tablet, Take 1 tablet (5 mg) by mouth every evening, Disp: , Rfl: 0     senna-docusate (SENOKOT-S/PERICOLACE) 8.6-50 MG tablet, Take 1 tablet by mouth 2 times daily, Disp: , Rfl: 0     sertraline (ZOLOFT) 25 MG tablet, Take 25 mg by mouth daily, Disp: , Rfl:      spironolactone (ALDACTONE) 25 MG tablet, Take 1 tablet (25 mg) by mouth daily, Disp: , Rfl: 0     tamsulosin (FLOMAX) 0.4 MG capsule, Take 0.4 mg by mouth in the morning., Disp: , Rfl:      vitamin D3 (CHOLECALCIFEROL) 50 mcg (2000 units) tablet, Take 1 tablet (50 mcg) by mouth daily, Disp: , Rfl: 0  No Known Allergies    All Meds and Allergies reviewed in the record at the facility and is the most up-to-date    REVIEW OF SYSTEMS:   Review of Systems  Seven-point review of systems reviewed.  Pertinent positives in HPI.    PHYSICAL EXAMINATION:  Physical Exam     Vital signs: /70   Pulse 68   Temp 98  F (36.7  C)   Resp 18   Ht 1.727 m (5' 8\")   Wt 97.6 kg (215 lb 1.6 oz)   SpO2 96%   BMI 32.71 kg/m    General: Awake, Alert, oriented x1, sitting up in wheelchair, follows simple commands  HEENT: Pink conjunctiva,  moist oral mucosa  NECK: Supple  CARDIOVASCULAR: S1-S2 without murmur gallop.  RESPIRATORY: No wheezes rales or rhonchi.  BACK: No kyphosis of the thoracic spine  ABDOMEN: Soft, round.   EXTREMITIES: Moves both upper and lower extremities with generalized weakness, no pedal edema, no calf tenderness  NEUROLOGIC: Intact, pulses palpable  PSYCHIATRIC: Cognitive impairment noted.      Labs:  All labs reviewed in the nursing home record and Epic   @  Lab Results   Component Value Date    WBC 7.7 04/05/2022     Lab Results   Component Value Date    RBC 3.97 04/05/2022     Lab Results   Component Value Date    HGB 8.5 04/06/2022     Lab Results   Component Value Date    HCT 30.6 04/05/2022     Lab Results   Component Value Date    MCV " 77 04/05/2022     Lab Results   Component Value Date    MCH 21.9 04/05/2022     Lab Results   Component Value Date    MCHC 28.4 04/05/2022     Lab Results   Component Value Date    RDW 27.3 04/05/2022     Lab Results   Component Value Date     04/05/2022        @Last Comprehensive Metabolic Panel:  Sodium   Date Value Ref Range Status   08/08/2022 135 (L) 136 - 145 mmol/L Final     Potassium   Date Value Ref Range Status   08/08/2022 4.5 3.4 - 5.3 mmol/L Final   04/11/2022 3.9 3.5 - 5.0 mmol/L Final     Chloride   Date Value Ref Range Status   08/08/2022 98 98 - 107 mmol/L Final   04/11/2022 103 98 - 107 mmol/L Final     Carbon Dioxide (CO2)   Date Value Ref Range Status   08/08/2022 22 22 - 29 mmol/L Final   04/11/2022 23 22 - 31 mmol/L Final     Anion Gap   Date Value Ref Range Status   08/08/2022 15 7 - 15 mmol/L Final   04/11/2022 12 5 - 18 mmol/L Final     Glucose   Date Value Ref Range Status   08/08/2022 200 (H) 70 - 99 mg/dL Final   04/11/2022 103 70 - 125 mg/dL Final     Urea Nitrogen   Date Value Ref Range Status   08/08/2022 15.6 8.0 - 23.0 mg/dL Final   04/11/2022 13 8 - 28 mg/dL Final     Creatinine   Date Value Ref Range Status   08/08/2022 1.07 0.67 - 1.17 mg/dL Final     GFR Estimate   Date Value Ref Range Status   08/08/2022 68 >60 mL/min/1.73m2 Final     Comment:     Effective December 21, 2021 eGFRcr in adults is calculated using the 2021 CKD-EPI creatinine equation which includes age and gender (Carlota nolasco al., NEJM, DOI: 10.1056/QSDQgj8893147)   03/17/2021 >60 >60 mL/min/1.73m2 Final     Calcium   Date Value Ref Range Status   08/08/2022 9.4 8.8 - 10.2 mg/dL Final         Assessment/Plan:      ICD-10-CM    1. Essential hypertension  I10  BP satisfactory.      2. Centrilobular emphysema (H)  J43.2  No cough on exam.  Denies shortness of breath.      3. Acute on chronic systolic heart failure (H)  I50.23  No shortness of breath.  No chest pain.  Weight stable.  Continue Lasix and  spironolactone.  Follow-up BMP.      4. Prostate cancer (H)  C61  history of.  BPH without evidence of urinary retention.  Continue Flomax.      5. Type 2 diabetes mellitus without complication, without long-term current use of insulin (H)  E11.9  Diet controlled.      6. Iron deficiency anemia due to chronic blood loss  D50.0  Last hemoglobin 10.0.  Follow-up CBC.      7. Mild cognitive disorder  F09  stable.  Recently started on sertraline for inappropriate sexual behavior.  No report of that on today's visit.      8. Gastroesophageal reflux disease with esophagitis, unspecified whether hemorrhage  K21.00  continue PPI.          This note has been dictated using voice recognition software. Any grammatical or context distortions are unintentional and inherent to the software    Electronically signed by: Meseret Nolen CNP           Sincerely,        Meseret Nolen, NP

## 2023-01-19 ENCOUNTER — LAB REQUISITION (OUTPATIENT)
Dept: LAB | Facility: CLINIC | Age: 88
End: 2023-01-19
Payer: COMMERCIAL

## 2023-01-19 DIAGNOSIS — D50.9 IRON DEFICIENCY ANEMIA, UNSPECIFIED: ICD-10-CM

## 2023-01-19 NOTE — PROGRESS NOTES
[facility] GERIATRIC SERVICES    Code Status:  DNR   Visit Type:   Chief Complaint   Patient presents with     assisted Regulatory     Facility:  Former[facility] () [[id number]]           History of Present Illness: Solitario Benjamin is a 86 year old male who I am seeing today for regulatory visit.  Patient  hospitalized on 4/1/2022 secondary to probable GI from esophagitis/gastritis.  Past medical history includes GERD with large hiatal hernia, gastritis, hypertension, acute on chronic systolic heart failure, diabetes mellitus type 2, prostate CA and weight loss.    Today patient sitting up in wheelchair.  He has underlying cognitive impairment.  He is just returned from a music event.  He denies any shortness of breath or chest pain.  No evidence of bleed.  GERD.  On PPI.  No inappropriate sexual behavior reported today.  He continues on sertraline to decrease libido.  History of BPH/prostate CA with history of urinary retention.  Continues on Flomax.  Hypertension.  Blood pressure satisfactory.  Heart failure compensated.  Diabetes mellitus type 2 satisfactory.    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     Dyspnea on exertion 04/01/2022     Symptomatic anemia 04/01/2022     Ecchymosis of forearm 04/01/2022     Acute on chronic systolic heart failure (H) 04/02/2022     NSTEMI -- demand ischemia 04/02/2022     Iron deficiency anemia due to chronic blood loss 04/04/2022     Prostate cancer (H) 04/04/2022     Acute blood loss anemia 04/04/2022     Thyroid nodule 04/07/2022     Obstructive sleep apnea syndrome 04/07/2022     Mixed hyperlipidemia 04/07/2022     Mild cognitive disorder 04/07/2022      History of malignant neoplasm of prostate 04/07/2022     History of fall 04/07/2022     Gastroesophageal reflux disease without esophagitis 04/07/2022     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     Gastrointestinal hemorrhage associated with peptic ulcer 04/19/2022     Gastroesophageal reflux disease with esophagitis, unspecified whether hemorrhage 04/19/2022     Urinary retention 04/19/2022     Weight loss 04/19/2022     Resolved Ambulatory Problems     Diagnosis Date Noted     PAVAN (acute kidney injury) (H) 01/21/2018     Tachycardia 01/21/2018     Respiratory infection 01/21/2018     SOB (shortness of breath) 04/01/2022     Moderate malnutrition (H) 04/05/2022     No Additional Past Medical History       Current Outpatient Medications:      acetaminophen (TYLENOL) 325 MG tablet, Take 2 tablets (650 mg) by mouth every 4 hours as needed for mild pain or fever, Disp: , Rfl: 0     albuterol (PROVENTIL) (2.5 MG/3ML) 0.083% neb solution, Take 1 vial (2.5 mg) by nebulization every 6 hours as needed for wheezing, Disp: 90 mL, Rfl: 0     aspirin (ASA) 81 MG EC tablet, Take 1 tablet (81 mg) by mouth daily, Disp: , Rfl: 0     docusate sodium (COLACE) 100 MG capsule, Take 100 mg by mouth daily, Disp: , Rfl:      furosemide (LASIX) 20 MG tablet, Take 1 tablet (20 mg) by mouth daily, Disp: , Rfl: 0     glucosamine-chondroitin 500-400 MG CAPS per capsule, Take 3 capsules by mouth daily, Disp: , Rfl:      losartan (COZAAR) 25 MG tablet, Take 1 tablet (25 mg) by mouth daily, Disp: , Rfl: 0     metoprolol succinate ER (TOPROL-XL) 25 MG 24 hr tablet, Take 1 tablet (25 mg) by mouth daily, Disp: , Rfl: 0     multivitamin therapeutic tablet, [MULTIVITAMIN THERAPEUTIC TABLET] Take 1 tablet by mouth daily., Disp: , Rfl:      Omega-3 Fatty Acids (FISH OIL) 1200 MG capsule, Take 1,200 mg by mouth daily, Disp: , Rfl:      pantoprazole (PROTONIX) 40 MG EC tablet, Take 1 tablet (40 mg) by mouth 2 times daily, Disp: , Rfl: 0     rosuvastatin  "(CRESTOR) 5 MG tablet, Take 1 tablet (5 mg) by mouth every evening, Disp: , Rfl: 0     senna-docusate (SENOKOT-S/PERICOLACE) 8.6-50 MG tablet, Take 1 tablet by mouth 2 times daily, Disp: , Rfl: 0     sertraline (ZOLOFT) 25 MG tablet, Take 25 mg by mouth daily, Disp: , Rfl:      spironolactone (ALDACTONE) 25 MG tablet, Take 1 tablet (25 mg) by mouth daily, Disp: , Rfl: 0     tamsulosin (FLOMAX) 0.4 MG capsule, Take 0.4 mg by mouth in the morning., Disp: , Rfl:      vitamin D3 (CHOLECALCIFEROL) 50 mcg (2000 units) tablet, Take 1 tablet (50 mcg) by mouth daily, Disp: , Rfl: 0  No Known Allergies    All Meds and Allergies reviewed in the record at the facility and is the most up-to-date    REVIEW OF SYSTEMS:   Review of Systems  Seven-point review of systems reviewed.  Pertinent positives in HPI.    PHYSICAL EXAMINATION:  Physical Exam     Vital signs: /70   Pulse 68   Temp 98  F (36.7  C)   Resp 18   Ht 1.727 m (5' 8\")   Wt 97.6 kg (215 lb 1.6 oz)   SpO2 96%   BMI 32.71 kg/m    General: Awake, Alert, oriented x1, sitting up in wheelchair, follows simple commands  HEENT: Pink conjunctiva,  moist oral mucosa  NECK: Supple  CARDIOVASCULAR: S1-S2 without murmur gallop.  RESPIRATORY: No wheezes rales or rhonchi.  BACK: No kyphosis of the thoracic spine  ABDOMEN: Soft, round.   EXTREMITIES: Moves both upper and lower extremities with generalized weakness, no pedal edema, no calf tenderness  NEUROLOGIC: Intact, pulses palpable  PSYCHIATRIC: Cognitive impairment noted.      Labs:  All labs reviewed in the nursing home record and Epic   @  Lab Results   Component Value Date    WBC 7.7 04/05/2022     Lab Results   Component Value Date    RBC 3.97 04/05/2022     Lab Results   Component Value Date    HGB 8.5 04/06/2022     Lab Results   Component Value Date    HCT 30.6 04/05/2022     Lab Results   Component Value Date    MCV 77 04/05/2022     Lab Results   Component Value Date    MCH 21.9 04/05/2022     Lab Results "   Component Value Date    MCHC 28.4 04/05/2022     Lab Results   Component Value Date    RDW 27.3 04/05/2022     Lab Results   Component Value Date     04/05/2022        @Last Comprehensive Metabolic Panel:  Sodium   Date Value Ref Range Status   08/08/2022 135 (L) 136 - 145 mmol/L Final     Potassium   Date Value Ref Range Status   08/08/2022 4.5 3.4 - 5.3 mmol/L Final   04/11/2022 3.9 3.5 - 5.0 mmol/L Final     Chloride   Date Value Ref Range Status   08/08/2022 98 98 - 107 mmol/L Final   04/11/2022 103 98 - 107 mmol/L Final     Carbon Dioxide (CO2)   Date Value Ref Range Status   08/08/2022 22 22 - 29 mmol/L Final   04/11/2022 23 22 - 31 mmol/L Final     Anion Gap   Date Value Ref Range Status   08/08/2022 15 7 - 15 mmol/L Final   04/11/2022 12 5 - 18 mmol/L Final     Glucose   Date Value Ref Range Status   08/08/2022 200 (H) 70 - 99 mg/dL Final   04/11/2022 103 70 - 125 mg/dL Final     Urea Nitrogen   Date Value Ref Range Status   08/08/2022 15.6 8.0 - 23.0 mg/dL Final   04/11/2022 13 8 - 28 mg/dL Final     Creatinine   Date Value Ref Range Status   08/08/2022 1.07 0.67 - 1.17 mg/dL Final     GFR Estimate   Date Value Ref Range Status   08/08/2022 68 >60 mL/min/1.73m2 Final     Comment:     Effective December 21, 2021 eGFRcr in adults is calculated using the 2021 CKD-EPI creatinine equation which includes age and gender (Carlota et al., NEJM, DOI: 10.1056/RXHUeu9996515)   03/17/2021 >60 >60 mL/min/1.73m2 Final     Calcium   Date Value Ref Range Status   08/08/2022 9.4 8.8 - 10.2 mg/dL Final         Assessment/Plan:      ICD-10-CM    1. Essential hypertension  I10  BP satisfactory.      2. Centrilobular emphysema (H)  J43.2  No cough on exam.  Denies shortness of breath.      3. Acute on chronic systolic heart failure (H)  I50.23  No shortness of breath.  No chest pain.  Weight stable.  Continue Lasix and spironolactone.  Follow-up BMP.      4. Prostate cancer (H)  C61  history of.  BPH without evidence of  urinary retention.  Continue Flomax.      5. Type 2 diabetes mellitus without complication, without long-term current use of insulin (H)  E11.9  Diet controlled.      6. Iron deficiency anemia due to chronic blood loss  D50.0  Last hemoglobin 10.0.  Follow-up CBC.      7. Mild cognitive disorder  F09  stable.  Recently started on sertraline for inappropriate sexual behavior.  No report of that on today's visit.      8. Gastroesophageal reflux disease with esophagitis, unspecified whether hemorrhage  K21.00  continue PPI.          This note has been dictated using voice recognition software. Any grammatical or context distortions are unintentional and inherent to the software    Electronically signed by: Meseret Nolen, CNP

## 2023-01-23 LAB
ANION GAP SERPL CALCULATED.3IONS-SCNC: 14 MMOL/L (ref 7–15)
BUN SERPL-MCNC: 13.5 MG/DL (ref 8–23)
CALCIUM SERPL-MCNC: 9.3 MG/DL (ref 8.8–10.2)
CHLORIDE SERPL-SCNC: 97 MMOL/L (ref 98–107)
CREAT SERPL-MCNC: 1.12 MG/DL (ref 0.67–1.17)
DEPRECATED HCO3 PLAS-SCNC: 23 MMOL/L (ref 22–29)
ERYTHROCYTE [DISTWIDTH] IN BLOOD BY AUTOMATED COUNT: 14 % (ref 10–15)
GFR SERPL CREATININE-BSD FRML MDRD: 64 ML/MIN/1.73M2
GLUCOSE SERPL-MCNC: 325 MG/DL (ref 70–99)
HCT VFR BLD AUTO: 40.2 % (ref 40–53)
HGB BLD-MCNC: 13.3 G/DL (ref 13.3–17.7)
MCH RBC QN AUTO: 30 PG (ref 26.5–33)
MCHC RBC AUTO-ENTMCNC: 33.1 G/DL (ref 31.5–36.5)
MCV RBC AUTO: 91 FL (ref 78–100)
PLATELET # BLD AUTO: 141 10E3/UL (ref 150–450)
POTASSIUM SERPL-SCNC: 4.9 MMOL/L (ref 3.4–5.3)
RBC # BLD AUTO: 4.44 10E6/UL (ref 4.4–5.9)
SODIUM SERPL-SCNC: 134 MMOL/L (ref 136–145)
WBC # BLD AUTO: 6.1 10E3/UL (ref 4–11)

## 2023-01-23 PROCEDURE — 80048 BASIC METABOLIC PNL TOTAL CA: CPT | Mod: ORL | Performed by: FAMILY MEDICINE

## 2023-01-23 PROCEDURE — 36415 COLL VENOUS BLD VENIPUNCTURE: CPT | Mod: ORL | Performed by: FAMILY MEDICINE

## 2023-01-23 PROCEDURE — P9604 ONE-WAY ALLOW PRORATED TRIP: HCPCS | Mod: ORL | Performed by: FAMILY MEDICINE

## 2023-01-23 PROCEDURE — 85027 COMPLETE CBC AUTOMATED: CPT | Mod: ORL | Performed by: FAMILY MEDICINE

## 2023-03-01 ENCOUNTER — NURSING HOME VISIT (OUTPATIENT)
Dept: GERIATRICS | Facility: CLINIC | Age: 88
End: 2023-03-01
Payer: COMMERCIAL

## 2023-03-01 VITALS
SYSTOLIC BLOOD PRESSURE: 165 MMHG | OXYGEN SATURATION: 94 % | DIASTOLIC BLOOD PRESSURE: 72 MMHG | HEART RATE: 49 BPM | RESPIRATION RATE: 21 BRPM | WEIGHT: 218.2 LBS | BODY MASS INDEX: 33.07 KG/M2 | HEIGHT: 68 IN | TEMPERATURE: 96.8 F

## 2023-03-01 DIAGNOSIS — U07.1 INFECTION DUE TO 2019 NOVEL CORONAVIRUS: Primary | ICD-10-CM

## 2023-03-01 DIAGNOSIS — J43.2 CENTRILOBULAR EMPHYSEMA (H): ICD-10-CM

## 2023-03-01 DIAGNOSIS — E11.9 TYPE 2 DIABETES MELLITUS WITHOUT COMPLICATION, WITHOUT LONG-TERM CURRENT USE OF INSULIN (H): ICD-10-CM

## 2023-03-01 DIAGNOSIS — F09 MILD COGNITIVE DISORDER: ICD-10-CM

## 2023-03-01 DIAGNOSIS — I50.23 ACUTE ON CHRONIC SYSTOLIC HEART FAILURE (H): ICD-10-CM

## 2023-03-01 PROCEDURE — 99309 SBSQ NF CARE MODERATE MDM 30: CPT | Mod: CS | Performed by: NURSE PRACTITIONER

## 2023-03-01 NOTE — LETTER
3/1/2023        RE: Solitario Benjamin  [address]  [facility] [address]        [facility]    Code Status:  DNR   Visit Type:   Chief Complaint   Patient presents with     [facility]     Facility:  [facility] () [[id number]]           History of Present Illness: Solitario Benjamin is a 86 year old male who I am seeing today for acute visit.  Patient  hospitalized on 4/1/2022 secondary to probable GI from esophagitis/gastritis.  Past medical history includes GERD with large hiatal hernia, gastritis, hypertension, acute on chronic systolic heart failure, diabetes mellitus type 2, prostate CA and weight loss.    Today patient sitting up in wheelchair.  He has underlying cognitive impairment and is a poor historian.  Patient with increased weakness and difficulty with transfers including fall.  He tested positive for COVID on 2/24/2023.  He did have some occasional cough and mostly cold-like symptoms including runny nose.  Increased weakness with difficulty transferring and fall.  No injury from fall.  He continues on oral antiviral.  Currently afebrile.  No cough on exam.  He does have underlying congestive heart failure.  History of COPD.    Assessment/plan:    1. Infection due to 2019 novel coronavirus  -Tested positive for COVID-19 infection on 2/24/2023.  -Patient completed his oral antiviral course.  -Overall weak and fatigued.  -Encourage fluids.  -Diuretics held briefly.  -Follow-up BMP.  -I-S every 4 hours while awake.    2. Acute on chronic systolic heart failure (H)  -Currently on spironolactone and Lasix.  -Monitor closely for dehydration given above.    3. Centrilobular emphysema (H)  -As needed nebs in place.  -Encourage cough deep breathe and I-S.    4. Type 2 diabetes mellitus without complication, without long-term current use of insulin (H)  -Satisfactory control.    5. Mild cognitive disorder  -Increased cognitive impairment most likely due to above illness.    6.   Fall  -Increased weakness secondary to COVID-19 infection and cognitive impairment.  -No LOC.  No injury.  -Okay for PT OT eval and treat once out of quarantine.      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     Dyspnea on exertion 04/01/2022     Symptomatic anemia 04/01/2022     Ecchymosis of forearm 04/01/2022     Acute on chronic systolic heart failure (H) 04/02/2022     NSTEMI -- demand ischemia 04/02/2022     Iron deficiency anemia due to chronic blood loss 04/04/2022     Prostate cancer (H) 04/04/2022     Acute blood loss anemia 04/04/2022     Thyroid nodule 04/07/2022     Obstructive sleep apnea syndrome 04/07/2022     Mixed hyperlipidemia 04/07/2022     Mild cognitive disorder 04/07/2022     History of malignant neoplasm of prostate 04/07/2022     History of fall 04/07/2022     Gastroesophageal reflux disease without esophagitis 04/07/2022     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     Gastrointestinal hemorrhage associated with peptic ulcer 04/19/2022     Gastroesophageal reflux disease with esophagitis, unspecified whether hemorrhage 04/19/2022     Urinary retention 04/19/2022     Weight loss 04/19/2022     Resolved Ambulatory Problems     Diagnosis Date Noted     PAVAN (acute kidney injury) (H) 01/21/2018     Tachycardia 01/21/2018     Respiratory infection 01/21/2018     SOB (shortness of breath) 04/01/2022     Moderate malnutrition (H) 04/05/2022     No Additional Past Medical History       Current Outpatient Medications:      acetaminophen (TYLENOL) 325 MG tablet, Take 2 tablets (650 mg) by mouth every 4 hours as needed for mild pain or fever, Disp: , Rfl: 0     albuterol (PROVENTIL) (2.5  MG/3ML) 0.083% neb solution, Take 1 vial (2.5 mg) by nebulization every 6 hours as needed for wheezing, Disp: 90 mL, Rfl: 0     aspirin (ASA) 81 MG EC tablet, Take 1 tablet (81 mg) by mouth daily, Disp: , Rfl: 0     docusate sodium (COLACE) 100 MG capsule, Take 100 mg by mouth daily, Disp: , Rfl:      furosemide (LASIX) 20 MG tablet, Take 1 tablet (20 mg) by mouth daily, Disp: , Rfl: 0     glucosamine-chondroitin 500-400 MG CAPS per capsule, Take 3 capsules by mouth daily, Disp: , Rfl:      losartan (COZAAR) 25 MG tablet, Take 1 tablet (25 mg) by mouth daily, Disp: , Rfl: 0     metoprolol succinate ER (TOPROL-XL) 25 MG 24 hr tablet, Take 1 tablet (25 mg) by mouth daily, Disp: , Rfl: 0     multivitamin therapeutic tablet, [MULTIVITAMIN THERAPEUTIC TABLET] Take 1 tablet by mouth daily., Disp: , Rfl:      Omega-3 Fatty Acids (FISH OIL) 1200 MG capsule, Take 1,200 mg by mouth daily, Disp: , Rfl:      pantoprazole (PROTONIX) 40 MG EC tablet, Take 1 tablet (40 mg) by mouth 2 times daily, Disp: , Rfl: 0     rosuvastatin (CRESTOR) 5 MG tablet, Take 1 tablet (5 mg) by mouth every evening, Disp: , Rfl: 0     senna-docusate (SENOKOT-S/PERICOLACE) 8.6-50 MG tablet, Take 1 tablet by mouth 2 times daily, Disp: , Rfl: 0     sertraline (ZOLOFT) 25 MG tablet, Take 25 mg by mouth daily, Disp: , Rfl:      spironolactone (ALDACTONE) 25 MG tablet, Take 1 tablet (25 mg) by mouth daily, Disp: , Rfl: 0     tamsulosin (FLOMAX) 0.4 MG capsule, Take 0.4 mg by mouth in the morning., Disp: , Rfl:      vitamin D3 (CHOLECALCIFEROL) 50 mcg (2000 units) tablet, Take 1 tablet (50 mcg) by mouth daily, Disp: , Rfl: 0  No Known Allergies    All Meds and Allergies reviewed in the record at the facility and is the most up-to-date    REVIEW OF SYSTEMS:   Review of Systems  Seven-point review of systems reviewed.  Pertinent positives in HPI.    PHYSICAL EXAMINATION:  Physical Exam     Vital signs: BP (!) 165/72   Pulse (!) 49   Temp 96.8  F (36  C)    "Resp 21   Ht 1.727 m (5' 8\")   Wt 99 kg (218 lb 3.2 oz)   SpO2 94%   BMI 33.18 kg/m    General: Awake, Alert, oriented x1, sitting up in bedside chair, follows simple commands, appears more confused than usual.  HEENT: Pink conjunctiva, dry oral mucosa, no rhinorrhea or nasal discharge.  NECK: Supple  CARDIOVASCULAR: S1-S2 without murmur gallop.  RESPIRATORY: No wheezes rales or rhonchi.  Slightly diminished in the bases.  BACK: No kyphosis of the thoracic spine  ABDOMEN: Soft, round with positive bowel sounds  EXTREMITIES: Moves both upper and lower extremities with generalized weakness.   NEUROLOGIC: Intact, pulses palpable  PSYCHIATRIC: Cognitive impairment noted.      Labs:  All labs reviewed in the nursing home record and Epic   @  Lab Results   Component Value Date    WBC 7.7 04/05/2022     Lab Results   Component Value Date    RBC 3.97 04/05/2022     Lab Results   Component Value Date    HGB 8.5 04/06/2022     Lab Results   Component Value Date    HCT 30.6 04/05/2022     Lab Results   Component Value Date    MCV 77 04/05/2022     Lab Results   Component Value Date    MCH 21.9 04/05/2022     Lab Results   Component Value Date    MCHC 28.4 04/05/2022     Lab Results   Component Value Date    RDW 27.3 04/05/2022     Lab Results   Component Value Date     04/05/2022        @Last Comprehensive Metabolic Panel:  Sodium   Date Value Ref Range Status   01/23/2023 134 (L) 136 - 145 mmol/L Final     Potassium   Date Value Ref Range Status   01/23/2023 4.9 3.4 - 5.3 mmol/L Final   04/11/2022 3.9 3.5 - 5.0 mmol/L Final     Chloride   Date Value Ref Range Status   01/23/2023 97 (L) 98 - 107 mmol/L Final   04/11/2022 103 98 - 107 mmol/L Final     Carbon Dioxide (CO2)   Date Value Ref Range Status   01/23/2023 23 22 - 29 mmol/L Final   04/11/2022 23 22 - 31 mmol/L Final     Anion Gap   Date Value Ref Range Status   01/23/2023 14 7 - 15 mmol/L Final   04/11/2022 12 5 - 18 mmol/L Final     Glucose   Date Value Ref " Range Status   01/23/2023 325 (H) 70 - 99 mg/dL Final   04/11/2022 103 70 - 125 mg/dL Final     Urea Nitrogen   Date Value Ref Range Status   01/23/2023 13.5 8.0 - 23.0 mg/dL Final   04/11/2022 13 8 - 28 mg/dL Final     Creatinine   Date Value Ref Range Status   01/23/2023 1.12 0.67 - 1.17 mg/dL Final     GFR Estimate   Date Value Ref Range Status   01/23/2023 64 >60 mL/min/1.73m2 Final     Comment:     eGFR calculated using 2021 CKD-EPI equation.   03/17/2021 >60 >60 mL/min/1.73m2 Final     Calcium   Date Value Ref Range Status   01/23/2023 9.3 8.8 - 10.2 mg/dL Final       This note has been dictated using voice recognition software. Any grammatical or context distortions are unintentional and inherent to the software    Electronically signed by: Meseret Nolen, ABRAHAM           Sincerely,        Meseret Nolen, NP

## 2023-03-06 NOTE — PROGRESS NOTES
[facility] GERIATRIC SERVICES    Code Status:  DNR   Visit Type:   Chief Complaint   Patient presents with     [facility]     Facility:  [facility] () [[id number]]           History of Present Illness: Solitario Benjamin is a 86 year old male who I am seeing today for acute visit.  Patient  hospitalized on 4/1/2022 secondary to probable GI from esophagitis/gastritis.  Past medical history includes GERD with large hiatal hernia, gastritis, hypertension, acute on chronic systolic heart failure, diabetes mellitus type 2, prostate CA and weight loss.    Today patient sitting up in wheelchair.  He has underlying cognitive impairment and is a poor historian.  Patient with increased weakness and difficulty with transfers including fall.  He tested positive for COVID on 2/24/2023.  He did have some occasional cough and mostly cold-like symptoms including runny nose.  Increased weakness with difficulty transferring and fall.  No injury from fall.  He continues on oral antiviral.  Currently afebrile.  No cough on exam.  He does have underlying congestive heart failure.  History of COPD.    Assessment/plan:    1. Infection due to 2019 novel coronavirus  -Tested positive for COVID-19 infection on 2/24/2023.  -Patient completed his oral antiviral course.  -Overall weak and fatigued.  -Encourage fluids.  -Diuretics held briefly.  -Follow-up BMP.  -I-S every 4 hours while awake.    2. Acute on chronic systolic heart failure (H)  -Currently on spironolactone and Lasix.  -Monitor closely for dehydration given above.    3. Centrilobular emphysema (H)  -As needed nebs in place.  -Encourage cough deep breathe and I-S.    4. Type 2 diabetes mellitus without complication, without long-term current use of insulin (H)  -Satisfactory control.    5. Mild cognitive disorder  -Increased cognitive impairment most likely due to above illness.    6.  Fall  -Increased weakness secondary to COVID-19 infection and cognitive impairment.  -No LOC.   No injury.  -Okay for PT OT eval and treat once out of quarantine.      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     Dyspnea on exertion 04/01/2022     Symptomatic anemia 04/01/2022     Ecchymosis of forearm 04/01/2022     Acute on chronic systolic heart failure (H) 04/02/2022     NSTEMI -- demand ischemia 04/02/2022     Iron deficiency anemia due to chronic blood loss 04/04/2022     Prostate cancer (H) 04/04/2022     Acute blood loss anemia 04/04/2022     Thyroid nodule 04/07/2022     Obstructive sleep apnea syndrome 04/07/2022     Mixed hyperlipidemia 04/07/2022     Mild cognitive disorder 04/07/2022     History of malignant neoplasm of prostate 04/07/2022     History of fall 04/07/2022     Gastroesophageal reflux disease without esophagitis 04/07/2022     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     Gastrointestinal hemorrhage associated with peptic ulcer 04/19/2022     Gastroesophageal reflux disease with esophagitis, unspecified whether hemorrhage 04/19/2022     Urinary retention 04/19/2022     Weight loss 04/19/2022     Resolved Ambulatory Problems     Diagnosis Date Noted     PAVAN (acute kidney injury) (H) 01/21/2018     Tachycardia 01/21/2018     Respiratory infection 01/21/2018     SOB (shortness of breath) 04/01/2022     Moderate malnutrition (H) 04/05/2022     No Additional Past Medical History       Current Outpatient Medications:      acetaminophen (TYLENOL) 325 MG tablet, Take 2 tablets (650 mg) by mouth every 4 hours as needed for mild pain or fever, Disp: , Rfl: 0     albuterol (PROVENTIL) (2.5 MG/3ML) 0.083% neb solution, Take 1 vial (2.5 mg) by nebulization every 6 hours as needed for  "wheezing, Disp: 90 mL, Rfl: 0     aspirin (ASA) 81 MG EC tablet, Take 1 tablet (81 mg) by mouth daily, Disp: , Rfl: 0     docusate sodium (COLACE) 100 MG capsule, Take 100 mg by mouth daily, Disp: , Rfl:      furosemide (LASIX) 20 MG tablet, Take 1 tablet (20 mg) by mouth daily, Disp: , Rfl: 0     glucosamine-chondroitin 500-400 MG CAPS per capsule, Take 3 capsules by mouth daily, Disp: , Rfl:      losartan (COZAAR) 25 MG tablet, Take 1 tablet (25 mg) by mouth daily, Disp: , Rfl: 0     metoprolol succinate ER (TOPROL-XL) 25 MG 24 hr tablet, Take 1 tablet (25 mg) by mouth daily, Disp: , Rfl: 0     multivitamin therapeutic tablet, [MULTIVITAMIN THERAPEUTIC TABLET] Take 1 tablet by mouth daily., Disp: , Rfl:      Omega-3 Fatty Acids (FISH OIL) 1200 MG capsule, Take 1,200 mg by mouth daily, Disp: , Rfl:      pantoprazole (PROTONIX) 40 MG EC tablet, Take 1 tablet (40 mg) by mouth 2 times daily, Disp: , Rfl: 0     rosuvastatin (CRESTOR) 5 MG tablet, Take 1 tablet (5 mg) by mouth every evening, Disp: , Rfl: 0     senna-docusate (SENOKOT-S/PERICOLACE) 8.6-50 MG tablet, Take 1 tablet by mouth 2 times daily, Disp: , Rfl: 0     sertraline (ZOLOFT) 25 MG tablet, Take 25 mg by mouth daily, Disp: , Rfl:      spironolactone (ALDACTONE) 25 MG tablet, Take 1 tablet (25 mg) by mouth daily, Disp: , Rfl: 0     tamsulosin (FLOMAX) 0.4 MG capsule, Take 0.4 mg by mouth in the morning., Disp: , Rfl:      vitamin D3 (CHOLECALCIFEROL) 50 mcg (2000 units) tablet, Take 1 tablet (50 mcg) by mouth daily, Disp: , Rfl: 0  No Known Allergies    All Meds and Allergies reviewed in the record at the facility and is the most up-to-date    REVIEW OF SYSTEMS:   Review of Systems  Seven-point review of systems reviewed.  Pertinent positives in HPI.    PHYSICAL EXAMINATION:  Physical Exam     Vital signs: BP (!) 165/72   Pulse (!) 49   Temp 96.8  F (36  C)   Resp 21   Ht 1.727 m (5' 8\")   Wt 99 kg (218 lb 3.2 oz)   SpO2 94%   BMI 33.18 kg/m  "   General: Awake, Alert, oriented x1, sitting up in bedside chair, follows simple commands, appears more confused than usual.  HEENT: Pink conjunctiva, dry oral mucosa, no rhinorrhea or nasal discharge.  NECK: Supple  CARDIOVASCULAR: S1-S2 without murmur gallop.  RESPIRATORY: No wheezes rales or rhonchi.  Slightly diminished in the bases.  BACK: No kyphosis of the thoracic spine  ABDOMEN: Soft, round with positive bowel sounds  EXTREMITIES: Moves both upper and lower extremities with generalized weakness.   NEUROLOGIC: Intact, pulses palpable  PSYCHIATRIC: Cognitive impairment noted.      Labs:  All labs reviewed in the nursing home record and Epic   @  Lab Results   Component Value Date    WBC 7.7 04/05/2022     Lab Results   Component Value Date    RBC 3.97 04/05/2022     Lab Results   Component Value Date    HGB 8.5 04/06/2022     Lab Results   Component Value Date    HCT 30.6 04/05/2022     Lab Results   Component Value Date    MCV 77 04/05/2022     Lab Results   Component Value Date    MCH 21.9 04/05/2022     Lab Results   Component Value Date    MCHC 28.4 04/05/2022     Lab Results   Component Value Date    RDW 27.3 04/05/2022     Lab Results   Component Value Date     04/05/2022        @Last Comprehensive Metabolic Panel:  Sodium   Date Value Ref Range Status   01/23/2023 134 (L) 136 - 145 mmol/L Final     Potassium   Date Value Ref Range Status   01/23/2023 4.9 3.4 - 5.3 mmol/L Final   04/11/2022 3.9 3.5 - 5.0 mmol/L Final     Chloride   Date Value Ref Range Status   01/23/2023 97 (L) 98 - 107 mmol/L Final   04/11/2022 103 98 - 107 mmol/L Final     Carbon Dioxide (CO2)   Date Value Ref Range Status   01/23/2023 23 22 - 29 mmol/L Final   04/11/2022 23 22 - 31 mmol/L Final     Anion Gap   Date Value Ref Range Status   01/23/2023 14 7 - 15 mmol/L Final   04/11/2022 12 5 - 18 mmol/L Final     Glucose   Date Value Ref Range Status   01/23/2023 325 (H) 70 - 99 mg/dL Final   04/11/2022 103 70 - 125 mg/dL  Final     Urea Nitrogen   Date Value Ref Range Status   01/23/2023 13.5 8.0 - 23.0 mg/dL Final   04/11/2022 13 8 - 28 mg/dL Final     Creatinine   Date Value Ref Range Status   01/23/2023 1.12 0.67 - 1.17 mg/dL Final     GFR Estimate   Date Value Ref Range Status   01/23/2023 64 >60 mL/min/1.73m2 Final     Comment:     eGFR calculated using 2021 CKD-EPI equation.   03/17/2021 >60 >60 mL/min/1.73m2 Final     Calcium   Date Value Ref Range Status   01/23/2023 9.3 8.8 - 10.2 mg/dL Final       This note has been dictated using voice recognition software. Any grammatical or context distortions are unintentional and inherent to the software    Electronically signed by: Meseret Nolen, CNP

## 2023-03-07 ENCOUNTER — TELEPHONE (OUTPATIENT)
Dept: GERIATRICS | Facility: CLINIC | Age: 88
End: 2023-03-07
Payer: COMMERCIAL

## 2023-03-07 NOTE — TELEPHONE ENCOUNTER
[place] [facility]ERIATRIC SERVICES NON-EMERGENT  ENCOUNTER    Solitario Benjamin is a 87 year old  (1935), phone call received today regarding: unwitnessed fall    Disposition    Pt had a fall last night and then this morning. Pt found on the floor this morning. Pt reported trying to get out of bed and sliding to the floor. Pt had minor abrasions on bilateral knees. No other pain or injury reported or observed. Vitals:  T 97.9, P 77, R 18, /68, O2 94%. ROM and neuro check intact. Facility will continue to monitor per protocol.      Electronically signed by:   Karin Omer RN

## 2023-03-21 NOTE — PROGRESS NOTES
[facility] GERIATRIC SERVICES       Patient Solitario Benjamin  MRN: [id number]        Reason for Visit     Chief Complaint   Patient presents with     senior care Regulatory       Code Status     DNR only    Assessment     Cognitive impairment  COPD  Hypertension  Type 2 diabetes  Chronic anemia due to blood loss  GERD  CHF  History of BPH  Generalized weakness    Plan     Pt is a resident of long-term care  Denies a history of COPD but currently stable and not hypoxic has minimal cough.  Recently recovered from COVID.  Diabetes control adequate on no medications  Last A1c was 5.4  At baseline has cognitive impairment and remains confused at baseline  Significant weakness and currently requiring assistance with ADLs and is wheelchair-bound  ON sertraline and no behaviors per staff  CHF controlled adequately with low-dose Lasix and spironolactone with no weight gain          History     Patient is a very pleasant 87 year old male who is a resident of long-term care  Recently had covid but has no recall  Mood is pleasant but confused with limited recall  Has become weaker and chair bound        Past Medical & Surgical History     PAST MEDICAL HISTORY:   Past Medical History:   Diagnosis Date     Accelerated hypertension 1/21/2018     Acute on chronic systolic heart failure (H) 4/2/2022     Centrilobular emphysema (H) 4/1/2022     Elevated troponin level not due to acute coronary syndrome 4/2/2022     Type 2 diabetes mellitus without complication, without long-term current use of insulin (H) 1/21/2018      PAST SURGICAL HISTORY:   has a past surgical history that includes Esophagoscopy, gastroscopy, duodenoscopy (EGD), combined (N/A, 4/4/2022) and Colonoscopy (N/A, 4/4/2022).      Past Social History     Reviewed,  reports that he quit smoking about 40 years ago. His smoking use included cigarettes and cigarettes. He has quit using smokeless tobacco. He reports that he does not drink alcohol and does not use  drugs.    Family History     Reviewed, and family history includes Myocardial Infarction in his brother, father, mother, and sister; Sleep Apnea in his child.    Medication List     Current Outpatient Medications   Medication     acetaminophen (TYLENOL) 325 MG tablet     albuterol (PROVENTIL) (2.5 MG/3ML) 0.083% neb solution     aspirin (ASA) 81 MG EC tablet     docusate sodium (COLACE) 100 MG capsule     furosemide (LASIX) 20 MG tablet     glucosamine-chondroitin 500-400 MG CAPS per capsule     losartan (COZAAR) 25 MG tablet     metoprolol succinate ER (TOPROL-XL) 25 MG 24 hr tablet     pantoprazole (PROTONIX) 40 MG EC tablet     rosuvastatin (CRESTOR) 5 MG tablet     senna-docusate (SENOKOT-S/PERICOLACE) 8.6-50 MG tablet     sertraline (ZOLOFT) 25 MG tablet     spironolactone (ALDACTONE) 25 MG tablet     tamsulosin (FLOMAX) 0.4 MG capsule     vitamin D3 (CHOLECALCIFEROL) 50 mcg (2000 units) tablet     No current facility-administered medications for this visit.          Allergies     No Known Allergies    Review of Systems   A comprehensive review of 14 systems was done. Pertinent findings noted here and in history of present illness. All the rest negative.  Constitutional: Negative.  Negative for fever, chills, he has  activity change, appetite change and fatigue.   HENT: Negative for congestion and facial swelling.    Eyes: Negative for photophobia, redness and visual disturbance.   Respiratory: Negative for cough and chest tightness.    Cardiovascular: Negative for chest pain, palpitations and leg swelling.   Gastrointestinal: Negative for nausea, diarrhea, constipation, blood in stool and abdominal distention.   Genitourinary: Negative.    Musculoskeletal: cannot walk much and needs help.    Skin: Negative.    Neurological: Negative for dizziness, tremors, syncope, weakness, light-headedness and headaches.   Hematological: Does not bruise/bleed easily.   Psychiatric/Behavioral: has confusion with limited  "recall.        Physical Exam   BP (!) 141/84   Pulse 80   Temp 97.3  F (36.3  C)   Resp 16   Ht 1.727 m (5' 8\")   Wt 94.8 kg (209 lb)   SpO2 94%   BMI 31.78 kg/m     GENERAL: no acute distress. Cooperative in conversation.   obese  HEENT: pupils are equal, round and reactive. Oral mucosa is moist and intact.  RESP:Chest symmetric. Regular respiratory rate. No stridor.  CVS S1S2  ABD: Nondistended, soft.  EXTREMITIES: No lower extremity edema.   NEURO: non focal. Alert and oriented x 2 WITH LIMITED RECALL.   PSYCH: within normal limits. No depression or anxiety.  SKIN: warm dry intact     Electronically signed by    Sailaja Bardales MD                       "

## 2023-03-22 ENCOUNTER — NURSING HOME VISIT (OUTPATIENT)
Dept: GERIATRICS | Facility: CLINIC | Age: 88
End: 2023-03-22
Payer: COMMERCIAL

## 2023-03-22 VITALS
DIASTOLIC BLOOD PRESSURE: 84 MMHG | OXYGEN SATURATION: 94 % | HEART RATE: 80 BPM | BODY MASS INDEX: 31.67 KG/M2 | WEIGHT: 209 LBS | TEMPERATURE: 97.3 F | RESPIRATION RATE: 16 BRPM | SYSTOLIC BLOOD PRESSURE: 141 MMHG | HEIGHT: 68 IN

## 2023-03-22 DIAGNOSIS — F09 MILD COGNITIVE DISORDER: Primary | ICD-10-CM

## 2023-03-22 DIAGNOSIS — J43.2 CENTRILOBULAR EMPHYSEMA (H): ICD-10-CM

## 2023-03-22 DIAGNOSIS — E11.9 TYPE 2 DIABETES MELLITUS WITHOUT COMPLICATION, WITHOUT LONG-TERM CURRENT USE OF INSULIN (H): ICD-10-CM

## 2023-03-22 DIAGNOSIS — I10 ESSENTIAL HYPERTENSION: ICD-10-CM

## 2023-03-22 PROCEDURE — 99309 SBSQ NF CARE MODERATE MDM 30: CPT | Performed by: FAMILY MEDICINE

## 2023-03-22 NOTE — LETTER
3/22/2023        RE: Solitario Benjamin  [facility]  [address] [address]        [facility]       Patient Solitario Benjamin  MRN: [id number]        Reason for Visit     Chief Complaint   Patient presents with     [facility] Regulatory       Code Status     DNR only    Assessment     Cognitive impairment  COPD  Hypertension  Type 2 diabetes  Chronic anemia due to blood loss  GERD  CHF  History of BPH  Generalized weakness    Plan     Pt is a resident of long-term care  Denies a history of COPD but currently stable and not hypoxic has minimal cough.  Recently recovered from COVID.  Diabetes control adequate on no medications  Last A1c was 5.4  At baseline has cognitive impairment and remains confused at baseline  Significant weakness and currently requiring assistance with ADLs and is wheelchair-bound  ON sertraline and no behaviors per staff  CHF controlled adequately with low-dose Lasix and spironolactone with no weight gain          History     Patient is a very pleasant 87 year old male who is a resident of long-term care  Recently had covid but has no recall  Mood is pleasant but confused with limited recall  Has become weaker and chair bound        Past Medical & Surgical History     PAST MEDICAL HISTORY:   Past Medical History:   Diagnosis Date     Accelerated hypertension 1/21/2018     Acute on chronic systolic heart failure (H) 4/2/2022     Centrilobular emphysema (H) 4/1/2022     Elevated troponin level not due to acute coronary syndrome 4/2/2022     Type 2 diabetes mellitus without complication, without long-term current use of insulin (H) 1/21/2018      PAST SURGICAL HISTORY:   has a past surgical history that includes Esophagoscopy, gastroscopy, duodenoscopy (EGD), combined (N/A, 4/4/2022) and Colonoscopy (N/A, 4/4/2022).      Past Social History     Reviewed,  reports that he quit smoking about 40 years ago. His smoking use included cigarettes and  cigarettes. He has quit using smokeless tobacco. He reports that he does not drink alcohol and does not use drugs.    Family History     Reviewed, and family history includes Myocardial Infarction in his brother, father, mother, and sister; Sleep Apnea in his child.    Medication List     Current Outpatient Medications   Medication     acetaminophen (TYLENOL) 325 MG tablet     albuterol (PROVENTIL) (2.5 MG/3ML) 0.083% neb solution     aspirin (ASA) 81 MG EC tablet     docusate sodium (COLACE) 100 MG capsule     furosemide (LASIX) 20 MG tablet     glucosamine-chondroitin 500-400 MG CAPS per capsule     losartan (COZAAR) 25 MG tablet     metoprolol succinate ER (TOPROL-XL) 25 MG 24 hr tablet     pantoprazole (PROTONIX) 40 MG EC tablet     rosuvastatin (CRESTOR) 5 MG tablet     senna-docusate (SENOKOT-S/PERICOLACE) 8.6-50 MG tablet     sertraline (ZOLOFT) 25 MG tablet     spironolactone (ALDACTONE) 25 MG tablet     tamsulosin (FLOMAX) 0.4 MG capsule     vitamin D3 (CHOLECALCIFEROL) 50 mcg (2000 units) tablet     No current facility-administered medications for this visit.          Allergies     No Known Allergies    Review of Systems   A comprehensive review of 14 systems was done. Pertinent findings noted here and in history of present illness. All the rest negative.  Constitutional: Negative.  Negative for fever, chills, he has  activity change, appetite change and fatigue.   HENT: Negative for congestion and facial swelling.    Eyes: Negative for photophobia, redness and visual disturbance.   Respiratory: Negative for cough and chest tightness.    Cardiovascular: Negative for chest pain, palpitations and leg swelling.   Gastrointestinal: Negative for nausea, diarrhea, constipation, blood in stool and abdominal distention.   Genitourinary: Negative.    Musculoskeletal: cannot walk much and needs help.    Skin: Negative.    Neurological: Negative for dizziness, tremors, syncope, weakness, light-headedness and  "headaches.   Hematological: Does not bruise/bleed easily.   Psychiatric/Behavioral: has confusion with limited recall.        Physical Exam   BP (!) 141/84   Pulse 80   Temp 97.3  F (36.3  C)   Resp 16   Ht 1.727 m (5' 8\")   Wt 94.8 kg (209 lb)   SpO2 94%   BMI 31.78 kg/m     GENERAL: no acute distress. Cooperative in conversation.   obese  HEENT: pupils are equal, round and reactive. Oral mucosa is moist and intact.  RESP:Chest symmetric. Regular respiratory rate. No stridor.  CVS S1S2  ABD: Nondistended, soft.  EXTREMITIES: No lower extremity edema.   NEURO: non focal. Alert and oriented x 2 WITH LIMITED RECALL.   PSYCH: within normal limits. No depression or anxiety.  SKIN: warm dry intact     Electronically signed by    Sailaja Bardales MD                             Sincerely,        LINWOOD Zendejas    "

## 2023-05-24 ENCOUNTER — NURSING HOME VISIT (OUTPATIENT)
Dept: GERIATRICS | Facility: CLINIC | Age: 88
End: 2023-05-24
Payer: COMMERCIAL

## 2023-05-24 VITALS
OXYGEN SATURATION: 92 % | HEART RATE: 89 BPM | HEIGHT: 68 IN | SYSTOLIC BLOOD PRESSURE: 152 MMHG | BODY MASS INDEX: 32.57 KG/M2 | WEIGHT: 214.9 LBS | TEMPERATURE: 97.1 F | DIASTOLIC BLOOD PRESSURE: 74 MMHG | RESPIRATION RATE: 20 BRPM

## 2023-05-24 DIAGNOSIS — J43.2 CENTRILOBULAR EMPHYSEMA (H): ICD-10-CM

## 2023-05-24 DIAGNOSIS — R41.89 COGNITIVE IMPAIRMENT: Primary | ICD-10-CM

## 2023-05-24 DIAGNOSIS — E11.9 TYPE 2 DIABETES MELLITUS WITHOUT COMPLICATION, WITHOUT LONG-TERM CURRENT USE OF INSULIN (H): ICD-10-CM

## 2023-05-24 DIAGNOSIS — K21.00 GASTROESOPHAGEAL REFLUX DISEASE WITH ESOPHAGITIS, UNSPECIFIED WHETHER HEMORRHAGE: ICD-10-CM

## 2023-05-24 DIAGNOSIS — D50.0 IRON DEFICIENCY ANEMIA DUE TO CHRONIC BLOOD LOSS: ICD-10-CM

## 2023-05-24 DIAGNOSIS — I10 ESSENTIAL HYPERTENSION: ICD-10-CM

## 2023-05-24 PROCEDURE — 99309 SBSQ NF CARE MODERATE MDM 30: CPT | Performed by: NURSE PRACTITIONER

## 2023-05-24 NOTE — LETTER
5/24/2023        RE: [facility]  [address]  [place] [address]        [facility]    Code Status:  DNR   Visit Type:   Chief Complaint   Patient presents with     detention Regulatory     Facility:  [facility] () [[id number]]           History of Present Illness: Solitario Benjamin is a 86 year old male who I am seeing today for regulatory visit.  Patient  hospitalized on 4/1/2022 secondary to probable GI from esophagitis/gastritis.  Past medical history includes GERD with large hiatal hernia, gastritis, hypertension, acute on chronic systolic heart failure, diabetes mellitus type 2, prostate CA and weight loss.    Today patient sitting up in wheelchair in dining room.  Patient with underlying cognitive impairment and is a poor historian.  Patient denies any shortness of breath or chest pain.  He is eating well.  He is having regular bowel movements.  Review of weights show they have been stable.  Underlying congestive heart failure.  Compensated.  History of COPD.  No evidence of exacerbation.  Hypertension.  Blood pressure satisfactory.  Diabetes mellitus type 2.  Diet controlled.  Patient with history of excessive masturbation.  He was trialed on Zoloft to aid in decreasing of libido.  This was labeled as a constraint and was discontinued.  Patient continues to masturbate.  Nursing staff attempt to offer privacy.  Due to underlying cognitive impairment patient is not always discrete with masturbation.    Assessment/plan:    1. Cognitive impairment  -Patient masturbates inappropriately.  He was trialed on Zoloft to help decrease libido.  This has been discontinued.  -Continue to offer privacy.    2. Essential hypertension  -Satisfactory control.    3. Type 2 diabetes mellitus without complication, without long-term current use of insulin (H)  -Diet controlled.    4. Centrilobular emphysema (H)  -No evidence of acute exacerbation.  -Continue as  needed nebs.    5. Iron deficiency anemia due to chronic blood loss  -Hemoglobin 10.0.  Up from 8.5.    6. Gastroesophageal reflux disease with esophagitis, unspecified whether hemorrhage  -Continue pantoprazole.    7.  Congestive heart failure  -Compensated with spironolactone and Lasix.  -Overall weight stable.    8.  BPH   -No evidence of urinary retention.  -Continue Flomax.        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     Dyspnea on exertion 04/01/2022     Symptomatic anemia 04/01/2022     Ecchymosis of forearm 04/01/2022     Acute on chronic systolic heart failure (H) 04/02/2022     NSTEMI -- demand ischemia 04/02/2022     Iron deficiency anemia due to chronic blood loss 04/04/2022     Prostate cancer (H) 04/04/2022     Acute blood loss anemia 04/04/2022     Thyroid nodule 04/07/2022     Obstructive sleep apnea syndrome 04/07/2022     Mixed hyperlipidemia 04/07/2022     Mild cognitive disorder 04/07/2022     History of malignant neoplasm of prostate 04/07/2022     History of fall 04/07/2022     Gastroesophageal reflux disease without esophagitis 04/07/2022     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     Gastrointestinal hemorrhage associated with peptic ulcer 04/19/2022     Gastroesophageal reflux disease with esophagitis, unspecified whether hemorrhage 04/19/2022     Urinary retention 04/19/2022     Weight loss 04/19/2022     Resolved Ambulatory Problems     Diagnosis Date Noted     PAVAN (acute kidney injury) (H) 01/21/2018     Tachycardia 01/21/2018     Respiratory infection 01/21/2018     SOB (shortness of breath) 04/01/2022     Moderate malnutrition (H) 04/05/2022     No Additional Past  Medical History       Current Outpatient Medications:      acetaminophen (TYLENOL) 325 MG tablet, Take 2 tablets (650 mg) by mouth every 4 hours as needed for mild pain or fever, Disp: , Rfl: 0     albuterol (PROVENTIL) (2.5 MG/3ML) 0.083% neb solution, Take 1 vial (2.5 mg) by nebulization every 6 hours as needed for wheezing, Disp: 90 mL, Rfl: 0     aspirin (ASA) 81 MG EC tablet, Take 1 tablet (81 mg) by mouth daily, Disp: , Rfl: 0     docusate sodium (COLACE) 100 MG capsule, Take 100 mg by mouth daily, Disp: , Rfl:      furosemide (LASIX) 20 MG tablet, Take 1 tablet (20 mg) by mouth daily, Disp: , Rfl: 0     glucosamine-chondroitin 500-400 MG CAPS per capsule, Take 3 capsules by mouth daily, Disp: , Rfl:      losartan (COZAAR) 25 MG tablet, Take 1 tablet (25 mg) by mouth daily, Disp: , Rfl: 0     metoprolol succinate ER (TOPROL-XL) 25 MG 24 hr tablet, Take 1 tablet (25 mg) by mouth daily, Disp: , Rfl: 0     pantoprazole (PROTONIX) 40 MG EC tablet, Take 1 tablet (40 mg) by mouth 2 times daily, Disp: , Rfl: 0     rosuvastatin (CRESTOR) 5 MG tablet, Take 1 tablet (5 mg) by mouth every evening, Disp: , Rfl: 0     senna-docusate (SENOKOT-S/PERICOLACE) 8.6-50 MG tablet, Take 1 tablet by mouth 2 times daily, Disp: , Rfl: 0     sertraline (ZOLOFT) 25 MG tablet, Take 25 mg by mouth daily, Disp: , Rfl:      spironolactone (ALDACTONE) 25 MG tablet, Take 1 tablet (25 mg) by mouth daily, Disp: , Rfl: 0     tamsulosin (FLOMAX) 0.4 MG capsule, Take 0.4 mg by mouth in the morning., Disp: , Rfl:      vitamin D3 (CHOLECALCIFEROL) 50 mcg (2000 units) tablet, Take 1 tablet (50 mcg) by mouth daily, Disp: , Rfl: 0  No Known Allergies    All Meds and Allergies reviewed in the record at the facility and is the most up-to-date    REVIEW OF SYSTEMS:   Review of Systems  Seven-point review of systems reviewed.  Pertinent positives in HPI.    PHYSICAL EXAMINATION:  Physical Exam     Vital signs: BP (!) 152/74   Pulse 89   Temp 97.1  F  "(36.2  C)   Resp 20   Ht 1.727 m (5' 8\")   Wt 97.5 kg (214 lb 14.4 oz)   SpO2 92%   BMI 32.68 kg/m    General: Awake, Alert, oriented x1, sitting up in wheelchair, follows simple commands  HEENT: Pink conjunctiva, moist oral mucosa  NECK: Supple  BACK: No kyphosis of the thoracic spine  ABDOMEN: Soft, round.   EXTREMITIES: Moves both upper and lower extremities with generalized weakness.  No pedal edema.  NEUROLOGIC: Intact  PSYCHIATRIC: Cognitive impairment noted.      Labs:  All labs reviewed in the nursing home record and Epic   @  Lab Results   Component Value Date    WBC 7.7 04/05/2022     Lab Results   Component Value Date    RBC 3.97 04/05/2022     Lab Results   Component Value Date    HGB 8.5 04/06/2022     Lab Results   Component Value Date    HCT 30.6 04/05/2022     Lab Results   Component Value Date    MCV 77 04/05/2022     Lab Results   Component Value Date    MCH 21.9 04/05/2022     Lab Results   Component Value Date    MCHC 28.4 04/05/2022     Lab Results   Component Value Date    RDW 27.3 04/05/2022     Lab Results   Component Value Date     04/05/2022        @Last Comprehensive Metabolic Panel:  Sodium   Date Value Ref Range Status   01/23/2023 134 (L) 136 - 145 mmol/L Final     Potassium   Date Value Ref Range Status   01/23/2023 4.9 3.4 - 5.3 mmol/L Final   04/11/2022 3.9 3.5 - 5.0 mmol/L Final     Chloride   Date Value Ref Range Status   01/23/2023 97 (L) 98 - 107 mmol/L Final   04/11/2022 103 98 - 107 mmol/L Final     Carbon Dioxide (CO2)   Date Value Ref Range Status   01/23/2023 23 22 - 29 mmol/L Final   04/11/2022 23 22 - 31 mmol/L Final     Anion Gap   Date Value Ref Range Status   01/23/2023 14 7 - 15 mmol/L Final   04/11/2022 12 5 - 18 mmol/L Final     Glucose   Date Value Ref Range Status   01/23/2023 325 (H) 70 - 99 mg/dL Final   04/11/2022 103 70 - 125 mg/dL Final     Urea Nitrogen   Date Value Ref Range Status   01/23/2023 13.5 8.0 - 23.0 mg/dL Final   04/11/2022 13 8 - 28 " mg/dL Final     Creatinine   Date Value Ref Range Status   01/23/2023 1.12 0.67 - 1.17 mg/dL Final     GFR Estimate   Date Value Ref Range Status   01/23/2023 64 >60 mL/min/1.73m2 Final     Comment:     eGFR calculated using 2021 CKD-EPI equation.   03/17/2021 >60 >60 mL/min/1.73m2 Final     Calcium   Date Value Ref Range Status   01/23/2023 9.3 8.8 - 10.2 mg/dL Final       This note has been dictated using voice recognition software. Any grammatical or context distortions are unintentional and inherent to the software    Electronically signed by: Meseret Nolen CNP           Sincerely,        Meseret Nolen, NP

## 2023-05-25 NOTE — PROGRESS NOTES
[facility]    Code Status:  DNR   Visit Type:   Chief Complaint   Patient presents with     senior living Regulatory     Facility:  [facility] () [[id number]]           History of Present Illness: Solitario Benjamin is a 86 year old male who I am seeing today for regulatory visit.  Patient  hospitalized on 4/1/2022 secondary to probable GI from esophagitis/gastritis.  Past medical history includes GERD with large hiatal hernia, gastritis, hypertension, acute on chronic systolic heart failure, diabetes mellitus type 2, prostate CA and weight loss.    Today patient sitting up in wheelchair in dining room.  Patient with underlying cognitive impairment and is a poor historian.  Patient denies any shortness of breath or chest pain.  He is eating well.  He is having regular bowel movements.  Review of weights show they have been stable.  Underlying congestive heart failure.  Compensated.  History of COPD.  No evidence of exacerbation.  Hypertension.  Blood pressure satisfactory.  Diabetes mellitus type 2.  Diet controlled.  Patient with history of excessive masturbation.  He was trialed on Zoloft to aid in decreasing of libido.  This was labeled as a constraint and was discontinued.  Patient continues to masturbate.  Nursing staff attempt to offer privacy.  Due to underlying cognitive impairment patient is not always discrete with masturbation.    Assessment/plan:    1. Cognitive impairment  -Patient masturbates inappropriately.  He was trialed on Zoloft to help decrease libido.  This has been discontinued.  -Continue to offer privacy.    2. Essential hypertension  -Satisfactory control.    3. Type 2 diabetes mellitus without complication, without long-term current use of insulin (H)  -Diet controlled.    4. Centrilobular emphysema (H)  -No evidence of acute exacerbation.  -Continue as needed nebs.    5. Iron deficiency anemia due to chronic blood loss  -Hemoglobin 10.0.  Up from 8.5.    6.  Gastroesophageal reflux disease with esophagitis, unspecified whether hemorrhage  -Continue pantoprazole.    7.  Congestive heart failure  -Compensated with spironolactone and Lasix.  -Overall weight stable.    8.  BPH   -No evidence of urinary retention.  -Continue Flomax.        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     Dyspnea on exertion 04/01/2022     Symptomatic anemia 04/01/2022     Ecchymosis of forearm 04/01/2022     Acute on chronic systolic heart failure (H) 04/02/2022     NSTEMI -- demand ischemia 04/02/2022     Iron deficiency anemia due to chronic blood loss 04/04/2022     Prostate cancer (H) 04/04/2022     Acute blood loss anemia 04/04/2022     Thyroid nodule 04/07/2022     Obstructive sleep apnea syndrome 04/07/2022     Mixed hyperlipidemia 04/07/2022     Mild cognitive disorder 04/07/2022     History of malignant neoplasm of prostate 04/07/2022     History of fall 04/07/2022     Gastroesophageal reflux disease without esophagitis 04/07/2022     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     Gastrointestinal hemorrhage associated with peptic ulcer 04/19/2022     Gastroesophageal reflux disease with esophagitis, unspecified whether hemorrhage 04/19/2022     Urinary retention 04/19/2022     Weight loss 04/19/2022     Resolved Ambulatory Problems     Diagnosis Date Noted     PAVAN (acute kidney injury) (H) 01/21/2018     Tachycardia 01/21/2018     Respiratory infection 01/21/2018     SOB (shortness of breath) 04/01/2022     Moderate malnutrition (H) 04/05/2022     No Additional Past Medical History       Current Outpatient Medications:      acetaminophen (TYLENOL) 325 MG tablet, Take 2 tablets  "(650 mg) by mouth every 4 hours as needed for mild pain or fever, Disp: , Rfl: 0     albuterol (PROVENTIL) (2.5 MG/3ML) 0.083% neb solution, Take 1 vial (2.5 mg) by nebulization every 6 hours as needed for wheezing, Disp: 90 mL, Rfl: 0     aspirin (ASA) 81 MG EC tablet, Take 1 tablet (81 mg) by mouth daily, Disp: , Rfl: 0     docusate sodium (COLACE) 100 MG capsule, Take 100 mg by mouth daily, Disp: , Rfl:      furosemide (LASIX) 20 MG tablet, Take 1 tablet (20 mg) by mouth daily, Disp: , Rfl: 0     glucosamine-chondroitin 500-400 MG CAPS per capsule, Take 3 capsules by mouth daily, Disp: , Rfl:      losartan (COZAAR) 25 MG tablet, Take 1 tablet (25 mg) by mouth daily, Disp: , Rfl: 0     metoprolol succinate ER (TOPROL-XL) 25 MG 24 hr tablet, Take 1 tablet (25 mg) by mouth daily, Disp: , Rfl: 0     pantoprazole (PROTONIX) 40 MG EC tablet, Take 1 tablet (40 mg) by mouth 2 times daily, Disp: , Rfl: 0     rosuvastatin (CRESTOR) 5 MG tablet, Take 1 tablet (5 mg) by mouth every evening, Disp: , Rfl: 0     senna-docusate (SENOKOT-S/PERICOLACE) 8.6-50 MG tablet, Take 1 tablet by mouth 2 times daily, Disp: , Rfl: 0     sertraline (ZOLOFT) 25 MG tablet, Take 25 mg by mouth daily, Disp: , Rfl:      spironolactone (ALDACTONE) 25 MG tablet, Take 1 tablet (25 mg) by mouth daily, Disp: , Rfl: 0     tamsulosin (FLOMAX) 0.4 MG capsule, Take 0.4 mg by mouth in the morning., Disp: , Rfl:      vitamin D3 (CHOLECALCIFEROL) 50 mcg (2000 units) tablet, Take 1 tablet (50 mcg) by mouth daily, Disp: , Rfl: 0  No Known Allergies    All Meds and Allergies reviewed in the record at the facility and is the most up-to-date    REVIEW OF SYSTEMS:   Review of Systems  Seven-point review of systems reviewed.  Pertinent positives in HPI.    PHYSICAL EXAMINATION:  Physical Exam     Vital signs: BP (!) 152/74   Pulse 89   Temp 97.1  F (36.2  C)   Resp 20   Ht 1.727 m (5' 8\")   Wt 97.5 kg (214 lb 14.4 oz)   SpO2 92%   BMI 32.68 kg/m    General: " Awake, Alert, oriented x1, sitting up in wheelchair, follows simple commands  HEENT: Pink conjunctiva, moist oral mucosa  NECK: Supple  BACK: No kyphosis of the thoracic spine  ABDOMEN: Soft, round.   EXTREMITIES: Moves both upper and lower extremities with generalized weakness.  No pedal edema.  NEUROLOGIC: Intact  PSYCHIATRIC: Cognitive impairment noted.      Labs:  All labs reviewed in the nursing home record and Epic   @  Lab Results   Component Value Date    WBC 7.7 04/05/2022     Lab Results   Component Value Date    RBC 3.97 04/05/2022     Lab Results   Component Value Date    HGB 8.5 04/06/2022     Lab Results   Component Value Date    HCT 30.6 04/05/2022     Lab Results   Component Value Date    MCV 77 04/05/2022     Lab Results   Component Value Date    MCH 21.9 04/05/2022     Lab Results   Component Value Date    MCHC 28.4 04/05/2022     Lab Results   Component Value Date    RDW 27.3 04/05/2022     Lab Results   Component Value Date     04/05/2022        @Last Comprehensive Metabolic Panel:  Sodium   Date Value Ref Range Status   01/23/2023 134 (L) 136 - 145 mmol/L Final     Potassium   Date Value Ref Range Status   01/23/2023 4.9 3.4 - 5.3 mmol/L Final   04/11/2022 3.9 3.5 - 5.0 mmol/L Final     Chloride   Date Value Ref Range Status   01/23/2023 97 (L) 98 - 107 mmol/L Final   04/11/2022 103 98 - 107 mmol/L Final     Carbon Dioxide (CO2)   Date Value Ref Range Status   01/23/2023 23 22 - 29 mmol/L Final   04/11/2022 23 22 - 31 mmol/L Final     Anion Gap   Date Value Ref Range Status   01/23/2023 14 7 - 15 mmol/L Final   04/11/2022 12 5 - 18 mmol/L Final     Glucose   Date Value Ref Range Status   01/23/2023 325 (H) 70 - 99 mg/dL Final   04/11/2022 103 70 - 125 mg/dL Final     Urea Nitrogen   Date Value Ref Range Status   01/23/2023 13.5 8.0 - 23.0 mg/dL Final   04/11/2022 13 8 - 28 mg/dL Final     Creatinine   Date Value Ref Range Status   01/23/2023 1.12 0.67 - 1.17 mg/dL Final     GFR Estimate    Date Value Ref Range Status   01/23/2023 64 >60 mL/min/1.73m2 Final     Comment:     eGFR calculated using 2021 CKD-EPI equation.   03/17/2021 >60 >60 mL/min/1.73m2 Final     Calcium   Date Value Ref Range Status   01/23/2023 9.3 8.8 - 10.2 mg/dL Final       This note has been dictated using voice recognition software. Any grammatical or context distortions are unintentional and inherent to the software    Electronically signed by: Meseret Nolen, CNP

## 2023-05-26 ENCOUNTER — LAB REQUISITION (OUTPATIENT)
Dept: LAB | Facility: CLINIC | Age: 88
End: 2023-05-26
Payer: COMMERCIAL

## 2023-05-26 DIAGNOSIS — I50.9 HEART FAILURE, UNSPECIFIED (H): ICD-10-CM

## 2023-05-30 LAB
ANION GAP SERPL CALCULATED.3IONS-SCNC: 12 MMOL/L (ref 7–15)
BUN SERPL-MCNC: 18 MG/DL (ref 8–23)
CALCIUM SERPL-MCNC: 8.8 MG/DL (ref 8.8–10.2)
CHLORIDE SERPL-SCNC: 100 MMOL/L (ref 98–107)
CREAT SERPL-MCNC: 1.15 MG/DL (ref 0.67–1.17)
DEPRECATED HCO3 PLAS-SCNC: 23 MMOL/L (ref 22–29)
ERYTHROCYTE [DISTWIDTH] IN BLOOD BY AUTOMATED COUNT: 13.8 % (ref 10–15)
GFR SERPL CREATININE-BSD FRML MDRD: 62 ML/MIN/1.73M2
GLUCOSE SERPL-MCNC: 257 MG/DL (ref 70–99)
HCT VFR BLD AUTO: 37.3 % (ref 40–53)
HGB BLD-MCNC: 12.2 G/DL (ref 13.3–17.7)
MCH RBC QN AUTO: 30 PG (ref 26.5–33)
MCHC RBC AUTO-ENTMCNC: 32.7 G/DL (ref 31.5–36.5)
MCV RBC AUTO: 92 FL (ref 78–100)
PLATELET # BLD AUTO: 143 10E3/UL (ref 150–450)
POTASSIUM SERPL-SCNC: 4 MMOL/L (ref 3.4–5.3)
RBC # BLD AUTO: 4.06 10E6/UL (ref 4.4–5.9)
SODIUM SERPL-SCNC: 135 MMOL/L (ref 136–145)
WBC # BLD AUTO: 7.5 10E3/UL (ref 4–11)

## 2023-05-30 PROCEDURE — P9604 ONE-WAY ALLOW PRORATED TRIP: HCPCS | Mod: ORL | Performed by: NURSE PRACTITIONER

## 2023-05-30 PROCEDURE — 85027 COMPLETE CBC AUTOMATED: CPT | Mod: ORL | Performed by: NURSE PRACTITIONER

## 2023-05-30 PROCEDURE — 80048 BASIC METABOLIC PNL TOTAL CA: CPT | Mod: ORL | Performed by: NURSE PRACTITIONER

## 2023-05-30 PROCEDURE — 36415 COLL VENOUS BLD VENIPUNCTURE: CPT | Mod: ORL | Performed by: NURSE PRACTITIONER

## 2023-07-31 ENCOUNTER — NURSING HOME VISIT (OUTPATIENT)
Dept: GERIATRICS | Facility: CLINIC | Age: 88
End: 2023-07-31
Payer: COMMERCIAL

## 2023-07-31 VITALS
BODY MASS INDEX: 32.43 KG/M2 | WEIGHT: 214 LBS | OXYGEN SATURATION: 93 % | HEIGHT: 68 IN | SYSTOLIC BLOOD PRESSURE: 118 MMHG | RESPIRATION RATE: 16 BRPM | HEART RATE: 67 BPM | DIASTOLIC BLOOD PRESSURE: 72 MMHG | TEMPERATURE: 97.7 F

## 2023-07-31 DIAGNOSIS — E11.9 TYPE 2 DIABETES MELLITUS WITHOUT COMPLICATION, WITHOUT LONG-TERM CURRENT USE OF INSULIN (H): ICD-10-CM

## 2023-07-31 DIAGNOSIS — I10 ESSENTIAL HYPERTENSION: ICD-10-CM

## 2023-07-31 DIAGNOSIS — K21.00 GASTROESOPHAGEAL REFLUX DISEASE WITH ESOPHAGITIS, UNSPECIFIED WHETHER HEMORRHAGE: ICD-10-CM

## 2023-07-31 DIAGNOSIS — J43.2 CENTRILOBULAR EMPHYSEMA (H): ICD-10-CM

## 2023-07-31 DIAGNOSIS — G47.33 OBSTRUCTIVE SLEEP APNEA SYNDROME: ICD-10-CM

## 2023-07-31 DIAGNOSIS — R41.89 COGNITIVE IMPAIRMENT: Primary | ICD-10-CM

## 2023-07-31 PROCEDURE — 99309 SBSQ NF CARE MODERATE MDM 30: CPT | Performed by: FAMILY MEDICINE

## 2023-07-31 NOTE — PROGRESS NOTES
[facility] GERIATRIC SERVICES       Patient Solitario Benjamin  MRN: [id number]        Reason for Visit     Chief Complaint   Patient presents with     shelter Regulatory       Code Status     DNR only    Assessment     Cognitive impairment  COPD  Hypertension  Type 2 diabetes  Chronic anemia due to blood loss  GERD  CHF  History of BPH  HECTOR  Generalized weakness    Plan     Pt is a resident of long-term care  Denies a history of COPD but currently stable and not hypoxic has PRN MDI  Diabetes control adequate on no medications  Last A1c was 5.4  At baseline has cognitive impairment and remains confused at baseline  No behavior concern per staff  Significant weakness and currently requiring assistance with ADLs and is wheelchair-bound  ON sertraline and mood is stable  CHF controlled adequately with low-dose Lasix and spironolactone with no weight gain  Cont current care plan  Will needs labs/hgA1c at next visit        History     Patient is a very pleasant 87 year old male who is a resident of long-term [facility]  PT  has no recall  Mood is pleasant but confused with limited recall  Has become weaker and chair bound  Has COPD but denies any wheezing  No concerns per staff        Past Medical & Surgical History     PAST MEDICAL HISTORY:   Past Medical History:   Diagnosis Date     Accelerated hypertension 1/21/2018     Acute on chronic systolic heart failure (H) 4/2/2022     Centrilobular emphysema (H) 4/1/2022     Elevated troponin level not due to acute coronary syndrome 4/2/2022     Type 2 diabetes mellitus without complication, without long-term current use of insulin (H) 1/21/2018      PAST SURGICAL HISTORY:   has a past surgical history that includes Esophagoscopy, gastroscopy, duodenoscopy (EGD), combined (N/A, 4/4/2022) and Colonoscopy (N/A, 4/4/2022).      Past Social History     Reviewed,  reports that he quit smoking about 40 years ago. His smoking use included cigarettes and cigarettes. He has quit using  smokeless tobacco. He reports that he does not drink alcohol and does not use drugs.    Family History     Reviewed, and family history includes Myocardial Infarction in his brother, father, mother, and sister; Sleep Apnea in his child.    Medication List     Current Outpatient Medications   Medication     acetaminophen (TYLENOL) 325 MG tablet     albuterol (PROVENTIL) (2.5 MG/3ML) 0.083% neb solution     aspirin (ASA) 81 MG EC tablet     docusate sodium (COLACE) 100 MG capsule     furosemide (LASIX) 20 MG tablet     glucosamine-chondroitin 500-400 MG CAPS per capsule     losartan (COZAAR) 25 MG tablet     metoprolol succinate ER (TOPROL-XL) 25 MG 24 hr tablet     pantoprazole (PROTONIX) 40 MG EC tablet     rosuvastatin (CRESTOR) 5 MG tablet     senna-docusate (SENOKOT-S/PERICOLACE) 8.6-50 MG tablet     sertraline (ZOLOFT) 25 MG tablet     spironolactone (ALDACTONE) 25 MG tablet     tamsulosin (FLOMAX) 0.4 MG capsule     vitamin D3 (CHOLECALCIFEROL) 50 mcg (2000 units) tablet     No current facility-administered medications for this visit.          Allergies     No Known Allergies    Review of Systems   A comprehensive review of 14 systems was done. Pertinent findings noted here and in history of present illness. All the rest negative.  Constitutional: Negative.  Negative for fever, chills, he has  activity change, appetite change and fatigue.   HENT: Negative for congestion and facial swelling.    Eyes: Negative for photophobia, redness and visual disturbance.   Respiratory: Negative for cough and chest tightness.    Cardiovascular: Negative for chest pain, palpitations and leg swelling.   Gastrointestinal: Negative for nausea, diarrhea, constipation, blood in stool and abdominal distention.   Genitourinary: Negative.    Musculoskeletal: cannot walk much and needs help.    Skin: Negative.    Neurological: Negative for dizziness, tremors, syncope, weakness, light-headedness and headaches.   Hematological: Does not  "bruise/bleed easily.   Psychiatric/Behavioral: has confusion with limited recall.        Physical Exam   /72   Pulse 67   Temp 97.7  F (36.5  C)   Resp 16   Ht 1.727 m (5' 8\")   Wt 97.1 kg (214 lb)   SpO2 93%   BMI 32.54 kg/m     GENERAL: no acute distress. Cooperative in conversation.   obese  HEENT: pupils are equal, round and reactive. Oral mucosa is moist and intact.  RESP:Chest symmetric. Regular respiratory rate. No stridor.  CVS S1S2  ABD: Nondistended, soft.  EXTREMITIES: No lower extremity edema.   NEURO: non focal. Alert and oriented x 2 WITH LIMITED RECALL.   PSYCH: within normal limits. No depression or anxiety.  SKIN: warm dry intact     Electronically signed by    Sailaja Bardales MD                       "

## 2023-07-31 NOTE — LETTER
7/31/2023        RE: Solitario Benjamin  [facility]  [address] [address]        M [facility] GERIATRIC SERVICES       Patient Solitario Benjamin  MRN: [id number]        Reason for Visit     Chief Complaint   Patient presents with     snf Regulatory       Code Status     DNR only    Assessment     Cognitive impairment  COPD  Hypertension  Type 2 diabetes  Chronic anemia due to blood loss  GERD  CHF  History of BPH  HECTOR  Generalized weakness    Plan     Pt is a resident of long-term care  Denies a history of COPD but currently stable and not hypoxic has PRN MDI  Diabetes control adequate on no medications  Last A1c was 5.4  At baseline has cognitive impairment and remains confused at baseline  No behavior concern per staff  Significant weakness and currently requiring assistance with ADLs and is wheelchair-bound  ON sertraline and mood is stable  CHF controlled adequately with low-dose Lasix and spironolactone with no weight gain  Cont current care plan  Will needs labs/hgA1c at next visit        History     Patient is a very pleasant 87 year old male who is a resident of long-term care  PT  has no recall  Mood is pleasant but confused with limited recall  Has become weaker and chair bound  Has COPD but denies any wheezing  No concerns per staff        Past Medical & Surgical History     PAST MEDICAL HISTORY:   Past Medical History:   Diagnosis Date     Accelerated hypertension 1/21/2018     Acute on chronic systolic heart failure (H) 4/2/2022     Centrilobular emphysema (H) 4/1/2022     Elevated troponin level not due to acute coronary syndrome 4/2/2022     Type 2 diabetes mellitus without complication, without long-term current use of insulin (H) 1/21/2018      PAST SURGICAL HISTORY:   has a past surgical history that includes Esophagoscopy, gastroscopy, duodenoscopy (EGD), combined (N/A, 4/4/2022) and Colonoscopy (N/A, 4/4/2022).      Past Social History     Reviewed,  reports that  he quit smoking about 40 years ago. His smoking use included cigarettes and cigarettes. He has quit using smokeless tobacco. He reports that he does not drink alcohol and does not use drugs.    Family History     Reviewed, and family history includes Myocardial Infarction in his brother, father, mother, and sister; Sleep Apnea in his child.    Medication List     Current Outpatient Medications   Medication     acetaminophen (TYLENOL) 325 MG tablet     albuterol (PROVENTIL) (2.5 MG/3ML) 0.083% neb solution     aspirin (ASA) 81 MG EC tablet     docusate sodium (COLACE) 100 MG capsule     furosemide (LASIX) 20 MG tablet     glucosamine-chondroitin 500-400 MG CAPS per capsule     losartan (COZAAR) 25 MG tablet     metoprolol succinate ER (TOPROL-XL) 25 MG 24 hr tablet     pantoprazole (PROTONIX) 40 MG EC tablet     rosuvastatin (CRESTOR) 5 MG tablet     senna-docusate (SENOKOT-S/PERICOLACE) 8.6-50 MG tablet     sertraline (ZOLOFT) 25 MG tablet     spironolactone (ALDACTONE) 25 MG tablet     tamsulosin (FLOMAX) 0.4 MG capsule     vitamin D3 (CHOLECALCIFEROL) 50 mcg (2000 units) tablet     No current facility-administered medications for this visit.          Allergies     No Known Allergies    Review of Systems   A comprehensive review of 14 systems was done. Pertinent findings noted here and in history of present illness. All the rest negative.  Constitutional: Negative.  Negative for fever, chills, he has  activity change, appetite change and fatigue.   HENT: Negative for congestion and facial swelling.    Eyes: Negative for photophobia, redness and visual disturbance.   Respiratory: Negative for cough and chest tightness.    Cardiovascular: Negative for chest pain, palpitations and leg swelling.   Gastrointestinal: Negative for nausea, diarrhea, constipation, blood in stool and abdominal distention.   Genitourinary: Negative.    Musculoskeletal: cannot walk much and needs help.    Skin: Negative.    Neurological:  "Negative for dizziness, tremors, syncope, weakness, light-headedness and headaches.   Hematological: Does not bruise/bleed easily.   Psychiatric/Behavioral: has confusion with limited recall.        Physical Exam   /72   Pulse 67   Temp 97.7  F (36.5  C)   Resp 16   Ht 1.727 m (5' 8\")   Wt 97.1 kg (214 lb)   SpO2 93%   BMI 32.54 kg/m     GENERAL: no acute distress. Cooperative in conversation.   obese  HEENT: pupils are equal, round and reactive. Oral mucosa is moist and intact.  RESP:Chest symmetric. Regular respiratory rate. No stridor.  CVS S1S2  ABD: Nondistended, soft.  EXTREMITIES: No lower extremity edema.   NEURO: non focal. Alert and oriented x 2 WITH LIMITED RECALL.   PSYCH: within normal limits. No depression or anxiety.  SKIN: warm dry intact     Electronically signed by    Sailaja Bardales MD                             Sincerely,        LINWOOD Zendejas      "

## 2023-08-17 ENCOUNTER — DOCUMENTATION ONLY (OUTPATIENT)
Dept: OTHER | Facility: CLINIC | Age: 88
End: 2023-08-17
Payer: COMMERCIAL

## 2023-09-13 ENCOUNTER — LAB REQUISITION (OUTPATIENT)
Dept: LAB | Facility: CLINIC | Age: 88
End: 2023-09-13
Payer: COMMERCIAL

## 2023-09-13 ENCOUNTER — NURSING HOME VISIT (OUTPATIENT)
Dept: GERIATRICS | Facility: CLINIC | Age: 88
End: 2023-09-13
Payer: COMMERCIAL

## 2023-09-13 VITALS
SYSTOLIC BLOOD PRESSURE: 114 MMHG | WEIGHT: 218.9 LBS | OXYGEN SATURATION: 93 % | DIASTOLIC BLOOD PRESSURE: 70 MMHG | HEART RATE: 71 BPM | HEIGHT: 68 IN | BODY MASS INDEX: 33.18 KG/M2 | TEMPERATURE: 96.2 F | RESPIRATION RATE: 16 BRPM

## 2023-09-13 DIAGNOSIS — E11.9 TYPE 2 DIABETES MELLITUS WITHOUT COMPLICATION, WITHOUT LONG-TERM CURRENT USE OF INSULIN (H): ICD-10-CM

## 2023-09-13 DIAGNOSIS — E78.5 HYPERLIPIDEMIA, UNSPECIFIED: ICD-10-CM

## 2023-09-13 DIAGNOSIS — I10 ESSENTIAL HYPERTENSION: ICD-10-CM

## 2023-09-13 DIAGNOSIS — N40.0 BENIGN PROSTATIC HYPERPLASIA WITHOUT LOWER URINARY TRACT SYMPTOMS: ICD-10-CM

## 2023-09-13 DIAGNOSIS — K21.00 GASTROESOPHAGEAL REFLUX DISEASE WITH ESOPHAGITIS, UNSPECIFIED WHETHER HEMORRHAGE: ICD-10-CM

## 2023-09-13 DIAGNOSIS — J43.2 CENTRILOBULAR EMPHYSEMA (H): ICD-10-CM

## 2023-09-13 DIAGNOSIS — E78.2 MIXED HYPERLIPIDEMIA: ICD-10-CM

## 2023-09-13 DIAGNOSIS — R41.89 COGNITIVE IMPAIRMENT: Primary | ICD-10-CM

## 2023-09-13 DIAGNOSIS — D50.0 IRON DEFICIENCY ANEMIA DUE TO CHRONIC BLOOD LOSS: ICD-10-CM

## 2023-09-13 PROCEDURE — 99310 SBSQ NF CARE HIGH MDM 45: CPT | Performed by: NURSE PRACTITIONER

## 2023-09-13 NOTE — LETTER
9/13/2023        RE: [facility]  [address]  [place] [address]        [facility]    Code Status:  DNR   Visit Type:   Chief Complaint   Patient presents with     Annual Comprehensive Nursing Home     Facility:  [facility] () [[id number]]           History of Present Illness: Solitario Benjamin is a 86 year old male who I am seeing today for Annual Comprehensive Nursing Home visit. Past medical history includes GERD with large hiatal hernia, gastritis, hypertension, acute on chronic systolic heart failure, diabetes mellitus type 2, prostate CA and weight loss.    Today patient sitting up in wheelchair. Patient with underlying cognitive impairment and is a poor historian. He is very pleasant on exam and answers questions appropriately. CHF compensated. COPD. No cough. No SOB or CP. Diabetes mellitus type 2.  Diet controlled. BP satisfactory.     Assessment/plan:    Cognitive impairment  -less masturbation in public.     Centrilobular emphysema (H)  -No evidence of exacerbation.     Essential hypertension  -Losartan 25 mg every day.  -Metoprolol 25 every day.     Systolic Heart Failure  -compensated with spironolactone and lasix.     Type 2 diabetes mellitus without complication, without long-term current use of insulin (H)  -Diet controlled.     Gastroesophageal reflux disease with esophagitis, unspecified whether hemorrhage  -Continue PPI.     Hyperlipidema   -Continue statin.     Iron deficiency anemia due to chronic blood loss  -Hemoglobin 10.0.  Up from 8.5.    BPH   -No evidence of urinary retention.  -Continue Flomax.    Follow up CBC, BMP and liver enzymes.     Active Ambulatory Problems     Diagnosis Date Noted     Lactic acid acidosis 01/21/2018     Accelerated hypertension 01/21/2018     Type 2 diabetes mellitus without complication, without long-term current use of insulin (H) 01/21/2018     Interstitial pneumonia (H) 01/22/2018      Essential hypertension 04/01/2022     COPD w Centrilobular emphysema  04/01/2022     Dyspnea on exertion 04/01/2022     Symptomatic anemia 04/01/2022     Ecchymosis of forearm 04/01/2022     Acute on chronic systolic heart failure (H) 04/02/2022     NSTEMI -- demand ischemia 04/02/2022     Iron deficiency anemia due to chronic blood loss 04/04/2022     Prostate cancer (H) 04/04/2022     Acute blood loss anemia 04/04/2022     Thyroid nodule 04/07/2022     Obstructive sleep apnea syndrome 04/07/2022     Mixed hyperlipidemia 04/07/2022     Mild cognitive disorder 04/07/2022     History of malignant neoplasm of prostate 04/07/2022     History of fall 04/07/2022     Gastroesophageal reflux disease without esophagitis 04/07/2022     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     Gastrointestinal hemorrhage associated with peptic ulcer 04/19/2022     Gastroesophageal reflux disease with esophagitis, unspecified whether hemorrhage 04/19/2022     Urinary retention 04/19/2022     Weight loss 04/19/2022     Resolved Ambulatory Problems     Diagnosis Date Noted     PAVAN (acute kidney injury) (H) 01/21/2018     Tachycardia 01/21/2018     Respiratory infection 01/21/2018     SOB (shortness of breath) 04/01/2022     Moderate malnutrition (H) 04/05/2022     No Additional Past Medical History       Current Outpatient Medications:      acetaminophen (TYLENOL) 325 MG tablet, Take 2 tablets (650 mg) by mouth every 4 hours as needed for mild pain or fever, Disp: , Rfl: 0     albuterol (PROVENTIL) (2.5 MG/3ML) 0.083% neb solution, Take 1 vial (2.5 mg) by nebulization every 6 hours as needed for wheezing, Disp: 90 mL, Rfl: 0     aspirin (ASA) 81 MG EC tablet, Take 1 tablet (81 mg) by mouth daily, Disp: , Rfl: 0     docusate sodium (COLACE) 100 MG capsule, Take 100 mg by mouth daily, Disp: , Rfl:      furosemide (LASIX) 20 MG tablet, Take 1 tablet (20 mg) by mouth daily, Disp: , Rfl: 0     glucosamine-chondroitin 500-400 MG CAPS per capsule, Take  "3 capsules by mouth daily, Disp: , Rfl:      losartan (COZAAR) 25 MG tablet, Take 1 tablet (25 mg) by mouth daily, Disp: , Rfl: 0     metoprolol succinate ER (TOPROL-XL) 25 MG 24 hr tablet, Take 1 tablet (25 mg) by mouth daily, Disp: , Rfl: 0     pantoprazole (PROTONIX) 40 MG EC tablet, Take 1 tablet (40 mg) by mouth 2 times daily, Disp: , Rfl: 0     rosuvastatin (CRESTOR) 5 MG tablet, Take 1 tablet (5 mg) by mouth every evening, Disp: , Rfl: 0     senna-docusate (SENOKOT-S/PERICOLACE) 8.6-50 MG tablet, Take 1 tablet by mouth 2 times daily, Disp: , Rfl: 0     sertraline (ZOLOFT) 25 MG tablet, Take 25 mg by mouth daily, Disp: , Rfl:      spironolactone (ALDACTONE) 25 MG tablet, Take 1 tablet (25 mg) by mouth daily, Disp: , Rfl: 0     tamsulosin (FLOMAX) 0.4 MG capsule, Take 0.4 mg by mouth in the morning., Disp: , Rfl:      vitamin D3 (CHOLECALCIFEROL) 50 mcg (2000 units) tablet, Take 1 tablet (50 mcg) by mouth daily, Disp: , Rfl: 0  No Known Allergies    All Meds and Allergies reviewed in the record at the facility and is the most up-to-date    REVIEW OF SYSTEMS:   Review of Systems  Seven-point review of systems reviewed.  Pertinent positives in HPI.    PHYSICAL EXAMINATION:  Physical Exam     Vital signs: /70   Pulse 71   Temp (!) 96.2  F (35.7  C)   Resp 16   Ht 1.727 m (5' 8\")   Wt 99.3 kg (218 lb 14.4 oz)   SpO2 93%   BMI 33.28 kg/m    General: Awake, Alert, oriented x1, sitting up in wheelchair, follows simple commands  HEENT: Pink conjunctiva, moist oral mucosa  NECK: Supple  CARDIOVASCULAR: S1, S2 without murmur or gallop.   RESPIRATORY: No wheezes, rales or rhonchi.   BACK: No kyphosis of the thoracic spine  ABDOMEN: Soft, round with positive bowel movement.   EXTREMITIES: Moves both upper and lower extremities with generalized weakness.  No pedal edema.  NEUROLOGIC: Intact  PSYCHIATRIC: Cognitive impairment noted.      Labs:  All labs reviewed in the nursing home record and Epic   @  Lab " Results   Component Value Date    WBC 7.7 04/05/2022     Lab Results   Component Value Date    RBC 3.97 04/05/2022     Lab Results   Component Value Date    HGB 8.5 04/06/2022     Lab Results   Component Value Date    HCT 30.6 04/05/2022     Lab Results   Component Value Date    MCV 77 04/05/2022     Lab Results   Component Value Date    MCH 21.9 04/05/2022     Lab Results   Component Value Date    MCHC 28.4 04/05/2022     Lab Results   Component Value Date    RDW 27.3 04/05/2022     Lab Results   Component Value Date     04/05/2022        @Last Comprehensive Metabolic Panel:  Sodium   Date Value Ref Range Status   05/30/2023 135 (L) 136 - 145 mmol/L Final     Potassium   Date Value Ref Range Status   05/30/2023 4.0 3.4 - 5.3 mmol/L Final   04/11/2022 3.9 3.5 - 5.0 mmol/L Final     Chloride   Date Value Ref Range Status   05/30/2023 100 98 - 107 mmol/L Final   04/11/2022 103 98 - 107 mmol/L Final     Carbon Dioxide (CO2)   Date Value Ref Range Status   05/30/2023 23 22 - 29 mmol/L Final   04/11/2022 23 22 - 31 mmol/L Final     Anion Gap   Date Value Ref Range Status   05/30/2023 12 7 - 15 mmol/L Final   04/11/2022 12 5 - 18 mmol/L Final     Glucose   Date Value Ref Range Status   05/30/2023 257 (H) 70 - 99 mg/dL Final   04/11/2022 103 70 - 125 mg/dL Final     Urea Nitrogen   Date Value Ref Range Status   05/30/2023 18.0 8.0 - 23.0 mg/dL Final   04/11/2022 13 8 - 28 mg/dL Final     Creatinine   Date Value Ref Range Status   05/30/2023 1.15 0.67 - 1.17 mg/dL Final     GFR Estimate   Date Value Ref Range Status   05/30/2023 62 >60 mL/min/1.73m2 Final     Comment:     eGFR calculated using 2021 CKD-EPI equation.   03/17/2021 >60 >60 mL/min/1.73m2 Final     Calcium   Date Value Ref Range Status   05/30/2023 8.8 8.8 - 10.2 mg/dL Final       This note has been dictated using voice recognition software. Any grammatical or context distortions are unintentional and inherent to the software    Electronically signed by:  Meseret Nolen, CNP       Sincerely,        Meseret Nolen, NP

## 2023-09-14 LAB
ALBUMIN SERPL BCG-MCNC: 4 G/DL (ref 3.5–5.2)
ALP SERPL-CCNC: 94 U/L (ref 40–129)
ALT SERPL W P-5'-P-CCNC: 15 U/L (ref 0–70)
ANION GAP SERPL CALCULATED.3IONS-SCNC: 10 MMOL/L (ref 7–15)
AST SERPL W P-5'-P-CCNC: 17 U/L (ref 0–45)
BASOPHILS # BLD AUTO: 0.1 10E3/UL (ref 0–0.2)
BASOPHILS NFR BLD AUTO: 1 %
BILIRUB DIRECT SERPL-MCNC: <0.2 MG/DL (ref 0–0.3)
BILIRUB SERPL-MCNC: 0.8 MG/DL
BUN SERPL-MCNC: 15.5 MG/DL (ref 8–23)
CALCIUM SERPL-MCNC: 9.3 MG/DL (ref 8.8–10.2)
CHLORIDE SERPL-SCNC: 97 MMOL/L (ref 98–107)
CREAT SERPL-MCNC: 1.14 MG/DL (ref 0.67–1.17)
DEPRECATED HCO3 PLAS-SCNC: 26 MMOL/L (ref 22–29)
EGFRCR SERPLBLD CKD-EPI 2021: 63 ML/MIN/1.73M2
EOSINOPHIL # BLD AUTO: 0.2 10E3/UL (ref 0–0.7)
EOSINOPHIL NFR BLD AUTO: 2 %
ERYTHROCYTE [DISTWIDTH] IN BLOOD BY AUTOMATED COUNT: 14.3 % (ref 10–15)
GLUCOSE SERPL-MCNC: 319 MG/DL (ref 70–99)
HCT VFR BLD AUTO: 40.5 % (ref 40–53)
HGB BLD-MCNC: 13.1 G/DL (ref 13.3–17.7)
IMM GRANULOCYTES # BLD: 0 10E3/UL
IMM GRANULOCYTES NFR BLD: 0 %
LYMPHOCYTES # BLD AUTO: 1.8 10E3/UL (ref 0.8–5.3)
LYMPHOCYTES NFR BLD AUTO: 28 %
MCH RBC QN AUTO: 27.9 PG (ref 26.5–33)
MCHC RBC AUTO-ENTMCNC: 32.3 G/DL (ref 31.5–36.5)
MCV RBC AUTO: 86 FL (ref 78–100)
MONOCYTES # BLD AUTO: 0.5 10E3/UL (ref 0–1.3)
MONOCYTES NFR BLD AUTO: 8 %
NEUTROPHILS # BLD AUTO: 3.9 10E3/UL (ref 1.6–8.3)
NEUTROPHILS NFR BLD AUTO: 61 %
NRBC # BLD AUTO: 0 10E3/UL
NRBC BLD AUTO-RTO: 0 /100
PLATELET # BLD AUTO: 146 10E3/UL (ref 150–450)
POTASSIUM SERPL-SCNC: 4.2 MMOL/L (ref 3.4–5.3)
PROT SERPL-MCNC: 6.4 G/DL (ref 6.4–8.3)
RBC # BLD AUTO: 4.7 10E6/UL (ref 4.4–5.9)
SODIUM SERPL-SCNC: 133 MMOL/L (ref 136–145)
WBC # BLD AUTO: 6.5 10E3/UL (ref 4–11)

## 2023-09-14 PROCEDURE — P9604 ONE-WAY ALLOW PRORATED TRIP: HCPCS | Mod: ORL | Performed by: NURSE PRACTITIONER

## 2023-09-14 PROCEDURE — 85025 COMPLETE CBC W/AUTO DIFF WBC: CPT | Mod: ORL | Performed by: NURSE PRACTITIONER

## 2023-09-14 PROCEDURE — 80053 COMPREHEN METABOLIC PANEL: CPT | Mod: ORL | Performed by: NURSE PRACTITIONER

## 2023-09-14 PROCEDURE — 36415 COLL VENOUS BLD VENIPUNCTURE: CPT | Mod: ORL | Performed by: NURSE PRACTITIONER

## 2023-09-14 PROCEDURE — 82248 BILIRUBIN DIRECT: CPT | Mod: ORL | Performed by: NURSE PRACTITIONER

## 2023-09-14 NOTE — PROGRESS NOTES
[facility] GERIATRIC SERVICES    Code Status:  DNR   Visit Type:   Chief Complaint   Patient presents with    Annual Comprehensive Nursing Home     Facility:  [facility] () [[id number]]           History of Present Illness: Solitario Benjamin is a 86 year old male who I am seeing today for Annual Comprehensive Nursing Home visit. Past medical history includes GERD with large hiatal hernia, gastritis, hypertension, acute on chronic systolic heart failure, diabetes mellitus type 2, prostate CA and weight loss.    Today patient sitting up in wheelchair. Patient with underlying cognitive impairment and is a poor historian. He is very pleasant on exam and answers questions appropriately. CHF compensated. COPD. No cough. No SOB or CP. Diabetes mellitus type 2.  Diet controlled. BP satisfactory.     Assessment/plan:    Cognitive impairment  -less masturbation in public.     Centrilobular emphysema (H)  -No evidence of exacerbation.     Essential hypertension  -Losartan 25 mg every day.  -Metoprolol 25 every day.     Systolic Heart Failure  -compensated with spironolactone and lasix.     Type 2 diabetes mellitus without complication, without long-term current use of insulin (H)  -Diet controlled.     Gastroesophageal reflux disease with esophagitis, unspecified whether hemorrhage  -Continue PPI.     Hyperlipidema   -Continue statin.     Iron deficiency anemia due to chronic blood loss  -Hemoglobin 10.0.  Up from 8.5.    BPH   -No evidence of urinary retention.  -Continue Flomax.    Follow up CBC, BMP and liver enzymes.     Active Ambulatory Problems     Diagnosis Date Noted    Lactic acid acidosis 01/21/2018    Accelerated hypertension 01/21/2018    Type 2 diabetes mellitus without complication, without long-term current use of insulin (H) 01/21/2018    Interstitial pneumonia (H) 01/22/2018    Essential hypertension 04/01/2022    COPD w Centrilobular emphysema  04/01/2022    Dyspnea on exertion 04/01/2022     Symptomatic anemia 04/01/2022    Ecchymosis of forearm 04/01/2022    Acute on chronic systolic heart failure (H) 04/02/2022    NSTEMI -- demand ischemia 04/02/2022    Iron deficiency anemia due to chronic blood loss 04/04/2022    Prostate cancer (H) 04/04/2022    Acute blood loss anemia 04/04/2022    Thyroid nodule 04/07/2022    Obstructive sleep apnea syndrome 04/07/2022    Mixed hyperlipidemia 04/07/2022    Mild cognitive disorder 04/07/2022    History of malignant neoplasm of prostate 04/07/2022    History of fall 04/07/2022    Gastroesophageal reflux disease without esophagitis 04/07/2022    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    Gastrointestinal hemorrhage associated with peptic ulcer 04/19/2022    Gastroesophageal reflux disease with esophagitis, unspecified whether hemorrhage 04/19/2022    Urinary retention 04/19/2022    Weight loss 04/19/2022     Resolved Ambulatory Problems     Diagnosis Date Noted    PAVAN (acute kidney injury) (H) 01/21/2018    Tachycardia 01/21/2018    Respiratory infection 01/21/2018    SOB (shortness of breath) 04/01/2022    Moderate malnutrition (H) 04/05/2022     No Additional Past Medical History       Current Outpatient Medications:     acetaminophen (TYLENOL) 325 MG tablet, Take 2 tablets (650 mg) by mouth every 4 hours as needed for mild pain or fever, Disp: , Rfl: 0    albuterol (PROVENTIL) (2.5 MG/3ML) 0.083% neb solution, Take 1 vial (2.5 mg) by nebulization every 6 hours as needed for wheezing, Disp: 90 mL, Rfl: 0    aspirin (ASA) 81 MG EC tablet, Take 1 tablet (81 mg) by mouth daily, Disp: , Rfl: 0    docusate sodium (COLACE) 100 MG capsule, Take 100 mg by mouth daily, Disp: , Rfl:     furosemide (LASIX) 20 MG tablet, Take 1 tablet (20 mg) by mouth daily, Disp: , Rfl: 0    glucosamine-chondroitin 500-400 MG CAPS per capsule, Take 3 capsules by mouth daily, Disp: , Rfl:     losartan (COZAAR) 25 MG tablet, Take 1 tablet (25 mg) by mouth daily, Disp: , Rfl: 0    metoprolol  "succinate ER (TOPROL-XL) 25 MG 24 hr tablet, Take 1 tablet (25 mg) by mouth daily, Disp: , Rfl: 0    pantoprazole (PROTONIX) 40 MG EC tablet, Take 1 tablet (40 mg) by mouth 2 times daily, Disp: , Rfl: 0    rosuvastatin (CRESTOR) 5 MG tablet, Take 1 tablet (5 mg) by mouth every evening, Disp: , Rfl: 0    senna-docusate (SENOKOT-S/PERICOLACE) 8.6-50 MG tablet, Take 1 tablet by mouth 2 times daily, Disp: , Rfl: 0    sertraline (ZOLOFT) 25 MG tablet, Take 25 mg by mouth daily, Disp: , Rfl:     spironolactone (ALDACTONE) 25 MG tablet, Take 1 tablet (25 mg) by mouth daily, Disp: , Rfl: 0    tamsulosin (FLOMAX) 0.4 MG capsule, Take 0.4 mg by mouth in the morning., Disp: , Rfl:     vitamin D3 (CHOLECALCIFEROL) 50 mcg (2000 units) tablet, Take 1 tablet (50 mcg) by mouth daily, Disp: , Rfl: 0  No Known Allergies    All Meds and Allergies reviewed in the record at the facility and is the most up-to-date    REVIEW OF SYSTEMS:   Review of Systems  Seven-point review of systems reviewed.  Pertinent positives in HPI.    PHYSICAL EXAMINATION:  Physical Exam     Vital signs: /70   Pulse 71   Temp (!) 96.2  F (35.7  C)   Resp 16   Ht 1.727 m (5' 8\")   Wt 99.3 kg (218 lb 14.4 oz)   SpO2 93%   BMI 33.28 kg/m    General: Awake, Alert, oriented x1, sitting up in wheelchair, follows simple commands  HEENT: Pink conjunctiva, moist oral mucosa  NECK: Supple  CARDIOVASCULAR: S1, S2 without murmur or gallop.   RESPIRATORY: No wheezes, rales or rhonchi.   BACK: No kyphosis of the thoracic spine  ABDOMEN: Soft, round with positive bowel movement.   EXTREMITIES: Moves both upper and lower extremities with generalized weakness.  No pedal edema.  NEUROLOGIC: Intact  PSYCHIATRIC: Cognitive impairment noted.      Labs:  All labs reviewed in the nursing home record and [facility]   @  Lab Results   Component Value Date    WBC 7.7 04/05/2022     Lab Results   Component Value Date    RBC 3.97 04/05/2022     Lab Results   Component Value Date    " HGB 8.5 04/06/2022     Lab Results   Component Value Date    HCT 30.6 04/05/2022     Lab Results   Component Value Date    MCV 77 04/05/2022     Lab Results   Component Value Date    MCH 21.9 04/05/2022     Lab Results   Component Value Date    MCHC 28.4 04/05/2022     Lab Results   Component Value Date    RDW 27.3 04/05/2022     Lab Results   Component Value Date     04/05/2022        @Last Comprehensive Metabolic Panel:  Sodium   Date Value Ref Range Status   05/30/2023 135 (L) 136 - 145 mmol/L Final     Potassium   Date Value Ref Range Status   05/30/2023 4.0 3.4 - 5.3 mmol/L Final   04/11/2022 3.9 3.5 - 5.0 mmol/L Final     Chloride   Date Value Ref Range Status   05/30/2023 100 98 - 107 mmol/L Final   04/11/2022 103 98 - 107 mmol/L Final     Carbon Dioxide (CO2)   Date Value Ref Range Status   05/30/2023 23 22 - 29 mmol/L Final   04/11/2022 23 22 - 31 mmol/L Final     Anion Gap   Date Value Ref Range Status   05/30/2023 12 7 - 15 mmol/L Final   04/11/2022 12 5 - 18 mmol/L Final     Glucose   Date Value Ref Range Status   05/30/2023 257 (H) 70 - 99 mg/dL Final   04/11/2022 103 70 - 125 mg/dL Final     Urea Nitrogen   Date Value Ref Range Status   05/30/2023 18.0 8.0 - 23.0 mg/dL Final   04/11/2022 13 8 - 28 mg/dL Final     Creatinine   Date Value Ref Range Status   05/30/2023 1.15 0.67 - 1.17 mg/dL Final     GFR Estimate   Date Value Ref Range Status   05/30/2023 62 >60 mL/min/1.73m2 Final     Comment:     eGFR calculated using 2021 CKD-EPI equation.   03/17/2021 >60 >60 mL/min/1.73m2 Final     Calcium   Date Value Ref Range Status   05/30/2023 8.8 8.8 - 10.2 mg/dL Final       This note has been dictated using voice recognition software. Any grammatical or context distortions are unintentional and inherent to the software    Electronically signed by: Meseret Nolen, CNP

## 2023-11-28 NOTE — PROGRESS NOTES
[facility] GERIATRIC SERVICES       Patient Soliatrio Benjamin  MRN: [id number]        Reason for Visit     Chief Complaint   Patient presents with    assisted Regulatory       Code Status     DNR only    Assessment     Cognitive impairment  COPD  Hypertension  Type 2 diabetes  Chronic anemia due to blood loss  GERD  CHF  History of BPH  HECTOR  Generalized weakness    Plan     Pt is a resident of long-term care  Denies a history of COPD but currently stable and not hypoxic   has PRN MDI but none scheduled and has no sob at rest  Diabetes control adequate on no medications  Last A1c was 5.4  At baseline has cognitive impairment and remains confused at baseline  No behavior concern per staff; mood is stable  Significant weakness and currently requiring assistance with ADLs and is wheelchair-bound  ON sertraline and mood is stable  CHF controlled adequately with low-dose Lasix and spironolactone with no weight gain  Htn managed with losartan and metoprolol  Cont flomax for BPH; no voiding concerns  Also on high doses of PPI with underlying history of GI bleed with gastritis will reduce dose to once a day  Recheck CBC and monitor for any ongoing bleeding  DC glucosamine/chondroitin for polypharmacy  Cont current care plan        History     Patient is a very pleasant 87 year old male who is a resident of long-term [facility]  PT  has no recall of recent events due to confusion  Mood is pleasant but confused with limited recall  Has become weaker and chair bound  Has COPD but denies any wheezing  Using only her as needed albuterol.  No concerns per staff  He has some concerns about inappropriate behaviors but none reported recently        Past Medical & Surgical History     PAST MEDICAL HISTORY:   Past Medical History:   Diagnosis Date    Accelerated hypertension 1/21/2018    Acute on chronic systolic heart failure (H) 4/2/2022    Centrilobular emphysema (H) 4/1/2022    Elevated troponin level not due to acute coronary syndrome  4/2/2022    Type 2 diabetes mellitus without complication, without long-term current use of insulin (H) 1/21/2018      PAST SURGICAL HISTORY:   has a past surgical history that includes Esophagoscopy, gastroscopy, duodenoscopy (EGD), combined (N/A, 4/4/2022) and Colonoscopy (N/A, 4/4/2022).      Past Social History     Reviewed,  reports that he quit smoking about 40 years ago. His smoking use included cigarettes and cigarettes. He has quit using smokeless tobacco. He reports that he does not drink alcohol and does not use drugs.    Family History     Reviewed, and family history includes Myocardial Infarction in his brother, father, mother, and sister; Sleep Apnea in his child.    Medication List     Current Outpatient Medications   Medication    acetaminophen (TYLENOL) 325 MG tablet    albuterol (PROVENTIL) (2.5 MG/3ML) 0.083% neb solution    docusate sodium (COLACE) 100 MG capsule    furosemide (LASIX) 20 MG tablet    glucosamine-chondroitin 500-400 MG CAPS per capsule    losartan (COZAAR) 25 MG tablet    metoprolol succinate ER (TOPROL-XL) 25 MG 24 hr tablet    pantoprazole (PROTONIX) 40 MG EC tablet    rosuvastatin (CRESTOR) 5 MG tablet    senna-docusate (SENOKOT-S/PERICOLACE) 8.6-50 MG tablet    spironolactone (ALDACTONE) 25 MG tablet    tamsulosin (FLOMAX) 0.4 MG capsule    aspirin (ASA) 81 MG EC tablet    sertraline (ZOLOFT) 25 MG tablet    vitamin D3 (CHOLECALCIFEROL) 50 mcg (2000 units) tablet     No current facility-administered medications for this visit.          Allergies     No Known Allergies    Review of Systems   A comprehensive review of 14 systems was done. Pertinent findings noted here and in history of present illness. All the rest negative.  Constitutional: Negative.  Negative for fever, chills, he has  activity change, appetite change and fatigue.   HENT: Negative for congestion and facial swelling.    Eyes: Negative for photophobia, redness and visual disturbance.   Respiratory: Negative for  "cough and chest tightness.    Cardiovascular: Negative for chest pain, palpitations and leg swelling.   Gastrointestinal: Negative for nausea, diarrhea, constipation, blood in stool and abdominal distention.   Genitourinary: Negative.    Musculoskeletal: cannot walk much and needs help.    Skin: Negative.    Neurological: Negative for dizziness, tremors, syncope, weakness, light-headedness and headaches.   Hematological: Does not bruise/bleed easily.   Psychiatric/Behavioral: has confusion with limited recall.        Physical Exam   /78   Pulse 67   Temp 97.6  F (36.4  C)   Resp 20   Ht 1.727 m (5' 8\")   Wt 98.9 kg (218 lb)   SpO2 97%   BMI 33.15 kg/m     GENERAL: no acute distress. Cooperative in conversation.   obese  HEENT: pupils are equal, round and reactive. Oral mucosa is moist and intact.  RESP:Chest symmetric. Regular respiratory rate. No stridor.  CVS S1S2  ABD: Nondistended, soft.  EXTREMITIES: No lower extremity edema.   NEURO: non focal. Alert and oriented x 2 WITH LIMITED RECALL.   PSYCH: within normal limits. No depression or anxiety.  SKIN: warm dry intact     Electronically signed by    Sailaja Bardales MD                       "

## 2023-11-29 ENCOUNTER — NURSING HOME VISIT (OUTPATIENT)
Dept: GERIATRICS | Facility: CLINIC | Age: 88
End: 2023-11-29
Payer: COMMERCIAL

## 2023-11-29 VITALS
DIASTOLIC BLOOD PRESSURE: 78 MMHG | RESPIRATION RATE: 20 BRPM | OXYGEN SATURATION: 97 % | HEIGHT: 68 IN | HEART RATE: 67 BPM | TEMPERATURE: 97.6 F | SYSTOLIC BLOOD PRESSURE: 135 MMHG | BODY MASS INDEX: 33.04 KG/M2 | WEIGHT: 218 LBS

## 2023-11-29 DIAGNOSIS — K21.00 GASTROESOPHAGEAL REFLUX DISEASE WITH ESOPHAGITIS, UNSPECIFIED WHETHER HEMORRHAGE: ICD-10-CM

## 2023-11-29 DIAGNOSIS — G47.33 OBSTRUCTIVE SLEEP APNEA SYNDROME: ICD-10-CM

## 2023-11-29 DIAGNOSIS — N40.0 BENIGN PROSTATIC HYPERPLASIA WITHOUT LOWER URINARY TRACT SYMPTOMS: ICD-10-CM

## 2023-11-29 DIAGNOSIS — R41.89 COGNITIVE IMPAIRMENT: Primary | ICD-10-CM

## 2023-11-29 DIAGNOSIS — I10 ESSENTIAL HYPERTENSION: ICD-10-CM

## 2023-11-29 DIAGNOSIS — E11.9 TYPE 2 DIABETES MELLITUS WITHOUT COMPLICATION, WITHOUT LONG-TERM CURRENT USE OF INSULIN (H): ICD-10-CM

## 2023-11-29 DIAGNOSIS — J43.2 CENTRILOBULAR EMPHYSEMA (H): ICD-10-CM

## 2023-11-29 DIAGNOSIS — D50.0 IRON DEFICIENCY ANEMIA DUE TO CHRONIC BLOOD LOSS: ICD-10-CM

## 2023-11-29 DIAGNOSIS — E78.2 MIXED HYPERLIPIDEMIA: ICD-10-CM

## 2023-11-29 PROCEDURE — 99309 SBSQ NF CARE MODERATE MDM 30: CPT | Performed by: FAMILY MEDICINE

## 2023-11-29 NOTE — LETTER
11/29/2023        RE: Solitario Benjamin  [facility]  [address] [place] [address]        [facility]       Patient Solitario Benjamin  MRN: [id number]        Reason for Visit     Chief Complaint   Patient presents with     senior living Regulatory       Code Status     DNR only    Assessment     Cognitive impairment  COPD  Hypertension  Type 2 diabetes  Chronic anemia due to blood loss  GERD  CHF  History of BPH  HECTOR  Generalized weakness    Plan     Pt is a resident of long-term care  Denies a history of COPD but currently stable and not hypoxic   has PRN MDI but none scheduled and has no sob at rest  Diabetes control adequate on no medications  Last A1c was 5.4  At baseline has cognitive impairment and remains confused at baseline  No behavior concern per staff; mood is stable  Significant weakness and currently requiring assistance with ADLs and is wheelchair-bound  ON sertraline and mood is stable  CHF controlled adequately with low-dose Lasix and spironolactone with no weight gain  Htn managed with losartan and metoprolol  Cont flomax for BPH; no voiding concerns  Also on high doses of PPI with underlying history of GI bleed with gastritis will reduce dose to once a day  Recheck CBC and monitor for any ongoing bleeding  DC glucosamine/chondroitin for polypharmacy  Cont current care plan        History     Patient is a very pleasant 87 year old male who is a resident of long-term care  PT  has no recall of recent events due to confusion  Mood is pleasant but confused with limited recall  Has become weaker and chair bound  Has COPD but denies any wheezing  Using only her as needed albuterol.  No concerns per staff  He has some concerns about inappropriate behaviors but none reported recently        Past Medical & Surgical History     PAST MEDICAL HISTORY:   Past Medical History:   Diagnosis Date     Accelerated hypertension 1/21/2018     Acute on chronic systolic heart failure  (H) 4/2/2022     Centrilobular emphysema (H) 4/1/2022     Elevated troponin level not due to acute coronary syndrome 4/2/2022     Type 2 diabetes mellitus without complication, without long-term current use of insulin (H) 1/21/2018      PAST SURGICAL HISTORY:   has a past surgical history that includes Esophagoscopy, gastroscopy, duodenoscopy (EGD), combined (N/A, 4/4/2022) and Colonoscopy (N/A, 4/4/2022).      Past Social History     Reviewed,  reports that he quit smoking about 40 years ago. His smoking use included cigarettes and cigarettes. He has quit using smokeless tobacco. He reports that he does not drink alcohol and does not use drugs.    Family History     Reviewed, and family history includes Myocardial Infarction in his brother, father, mother, and sister; Sleep Apnea in his child.    Medication List     Current Outpatient Medications   Medication     acetaminophen (TYLENOL) 325 MG tablet     albuterol (PROVENTIL) (2.5 MG/3ML) 0.083% neb solution     docusate sodium (COLACE) 100 MG capsule     furosemide (LASIX) 20 MG tablet     glucosamine-chondroitin 500-400 MG CAPS per capsule     losartan (COZAAR) 25 MG tablet     metoprolol succinate ER (TOPROL-XL) 25 MG 24 hr tablet     pantoprazole (PROTONIX) 40 MG EC tablet     rosuvastatin (CRESTOR) 5 MG tablet     senna-docusate (SENOKOT-S/PERICOLACE) 8.6-50 MG tablet     spironolactone (ALDACTONE) 25 MG tablet     tamsulosin (FLOMAX) 0.4 MG capsule     aspirin (ASA) 81 MG EC tablet     sertraline (ZOLOFT) 25 MG tablet     vitamin D3 (CHOLECALCIFEROL) 50 mcg (2000 units) tablet     No current facility-administered medications for this visit.          Allergies     No Known Allergies    Review of Systems   A comprehensive review of 14 systems was done. Pertinent findings noted here and in history of present illness. All the rest negative.  Constitutional: Negative.  Negative for fever, chills, he has  activity change, appetite change and fatigue.   HENT:  "Negative for congestion and facial swelling.    Eyes: Negative for photophobia, redness and visual disturbance.   Respiratory: Negative for cough and chest tightness.    Cardiovascular: Negative for chest pain, palpitations and leg swelling.   Gastrointestinal: Negative for nausea, diarrhea, constipation, blood in stool and abdominal distention.   Genitourinary: Negative.    Musculoskeletal: cannot walk much and needs help.    Skin: Negative.    Neurological: Negative for dizziness, tremors, syncope, weakness, light-headedness and headaches.   Hematological: Does not bruise/bleed easily.   Psychiatric/Behavioral: has confusion with limited recall.        Physical Exam   /78   Pulse 67   Temp 97.6  F (36.4  C)   Resp 20   Ht 1.727 m (5' 8\")   Wt 98.9 kg (218 lb)   SpO2 97%   BMI 33.15 kg/m     GENERAL: no acute distress. Cooperative in conversation.   obese  HEENT: pupils are equal, round and reactive. Oral mucosa is moist and intact.  RESP:Chest symmetric. Regular respiratory rate. No stridor.  CVS S1S2  ABD: Nondistended, soft.  EXTREMITIES: No lower extremity edema.   NEURO: non focal. Alert and oriented x 2 WITH LIMITED RECALL.   PSYCH: within normal limits. No depression or anxiety.  SKIN: warm dry intact     Electronically signed by    Sailaja Bardales MD                           Sincerely,        LINWOOD Zendejas      "

## 2023-12-12 ENCOUNTER — LAB REQUISITION (OUTPATIENT)
Dept: LAB | Facility: CLINIC | Age: 88
End: 2023-12-12
Payer: COMMERCIAL

## 2023-12-12 DIAGNOSIS — U07.1 COVID-19: ICD-10-CM

## 2023-12-12 DIAGNOSIS — I50.22 CHRONIC SYSTOLIC (CONGESTIVE) HEART FAILURE (H): ICD-10-CM

## 2023-12-12 DIAGNOSIS — R26.2 DIFFICULTY IN WALKING, NOT ELSEWHERE CLASSIFIED: ICD-10-CM

## 2023-12-13 LAB
ERYTHROCYTE [DISTWIDTH] IN BLOOD BY AUTOMATED COUNT: 14.6 % (ref 10–15)
HCT VFR BLD AUTO: 40 % (ref 40–53)
HGB BLD-MCNC: 13.2 G/DL (ref 13.3–17.7)
MCH RBC QN AUTO: 28 PG (ref 26.5–33)
MCHC RBC AUTO-ENTMCNC: 33 G/DL (ref 31.5–36.5)
MCV RBC AUTO: 85 FL (ref 78–100)
PLATELET # BLD AUTO: 147 10E3/UL (ref 150–450)
RBC # BLD AUTO: 4.71 10E6/UL (ref 4.4–5.9)
WBC # BLD AUTO: 6.7 10E3/UL (ref 4–11)

## 2023-12-13 PROCEDURE — 36415 COLL VENOUS BLD VENIPUNCTURE: CPT | Mod: ORL | Performed by: FAMILY MEDICINE

## 2023-12-13 PROCEDURE — 85027 COMPLETE CBC AUTOMATED: CPT | Mod: ORL | Performed by: FAMILY MEDICINE

## 2023-12-13 PROCEDURE — P9604 ONE-WAY ALLOW PRORATED TRIP: HCPCS | Mod: ORL | Performed by: FAMILY MEDICINE

## 2024-01-01 ENCOUNTER — LAB REQUISITION (OUTPATIENT)
Dept: LAB | Facility: CLINIC | Age: 89
End: 2024-01-01
Payer: COMMERCIAL

## 2024-01-01 DIAGNOSIS — E11.9 TYPE 2 DIABETES MELLITUS WITHOUT COMPLICATIONS (H): ICD-10-CM

## 2024-01-03 ENCOUNTER — NURSING HOME VISIT (OUTPATIENT)
Dept: GERIATRICS | Facility: CLINIC | Age: 89
End: 2024-01-03
Payer: COMMERCIAL

## 2024-01-03 VITALS
HEART RATE: 55 BPM | OXYGEN SATURATION: 94 % | BODY MASS INDEX: 29.26 KG/M2 | TEMPERATURE: 97.6 F | HEIGHT: 72 IN | RESPIRATION RATE: 16 BRPM | SYSTOLIC BLOOD PRESSURE: 132 MMHG | DIASTOLIC BLOOD PRESSURE: 75 MMHG | WEIGHT: 216 LBS

## 2024-01-03 DIAGNOSIS — R79.89 TSH ELEVATION: ICD-10-CM

## 2024-01-03 DIAGNOSIS — R41.89 COGNITIVE IMPAIRMENT: Primary | ICD-10-CM

## 2024-01-03 DIAGNOSIS — Z86.39 HX OF THYROID NODULE: ICD-10-CM

## 2024-01-03 DIAGNOSIS — E11.9 TYPE 2 DIABETES MELLITUS WITHOUT COMPLICATION, WITHOUT LONG-TERM CURRENT USE OF INSULIN (H): ICD-10-CM

## 2024-01-03 DIAGNOSIS — F09 MILD COGNITIVE DISORDER: ICD-10-CM

## 2024-01-03 LAB
HBA1C MFR BLD: 13.3 %
TSH SERPL DL<=0.005 MIU/L-ACNC: 5.99 UIU/ML (ref 0.3–4.2)
VIT B12 SERPL-MCNC: 459 PG/ML (ref 232–1245)

## 2024-01-03 PROCEDURE — 36415 COLL VENOUS BLD VENIPUNCTURE: CPT | Mod: ORL | Performed by: FAMILY MEDICINE

## 2024-01-03 PROCEDURE — 99309 SBSQ NF CARE MODERATE MDM 30: CPT | Performed by: NURSE PRACTITIONER

## 2024-01-03 PROCEDURE — 82607 VITAMIN B-12: CPT | Mod: ORL | Performed by: FAMILY MEDICINE

## 2024-01-03 PROCEDURE — 84443 ASSAY THYROID STIM HORMONE: CPT | Mod: ORL | Performed by: FAMILY MEDICINE

## 2024-01-03 PROCEDURE — P9604 ONE-WAY ALLOW PRORATED TRIP: HCPCS | Mod: ORL | Performed by: FAMILY MEDICINE

## 2024-01-03 PROCEDURE — 83036 HEMOGLOBIN GLYCOSYLATED A1C: CPT | Mod: ORL | Performed by: FAMILY MEDICINE

## 2024-01-03 NOTE — LETTER
1/3/2024        RE: [facility]  [address]  [place] [address]        [facility]    Code Status:  DNR   Visit Type:   Chief Complaint   Patient presents with     longterm Regulatory     Facility:  [facility] () [[id number]]           History of Present Illness: Solitario Benjamin is a 86 year old male who I am seeing today for Annual Comprehensive Nursing Home visit. Past medical history includes GERD with large hiatal hernia, gastritis, hypertension, acute on chronic systolic heart failure, diabetes mellitus type 2, prostate CA and weight loss.    Today patient sitting up in wheelchair. Patient with underlying cognitive impairment and is a poor historian.  Laboratory returned today with hemoglobin A1c of 13.3.  Patient with known diabetes mellitus type 2 diet controlled.  He is very noncompliant with diabetic diet.  I did talk with his son today about starting medication.  He is in agreement.  TSH 5.5.  No known hypothyroidism.  Will monitor in elderly patients.    Assessment/plan:    Cognitive impairment  -Advanced.    Type 2 diabetes mellitus without complication, without long-term current use of insulin (H)  -Hemoglobin A1c 13.3.  -Start metformin 250 mg daily.  -Blood sugar checks twice daily.  -Consistent carb diet.  -Follow-up BMP in 1 week.    Elevated TSH   -History of thyroid nodule.  -TSH 5.5.  -Repeat 4 weeks.    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     Dyspnea on exertion 04/01/2022     Symptomatic anemia 04/01/2022     Ecchymosis of forearm 04/01/2022     Acute on chronic systolic heart failure (H) 04/02/2022      NSTEMI -- demand ischemia 04/02/2022     Iron deficiency anemia due to chronic blood loss 04/04/2022     Prostate cancer (H) 04/04/2022     Acute blood loss anemia 04/04/2022     Thyroid nodule 04/07/2022     Obstructive sleep apnea syndrome 04/07/2022     Mixed hyperlipidemia 04/07/2022     Mild cognitive disorder 04/07/2022     History of malignant neoplasm of prostate 04/07/2022     History of fall 04/07/2022     Gastroesophageal reflux disease without esophagitis 04/07/2022     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     Gastrointestinal hemorrhage associated with peptic ulcer 04/19/2022     Gastroesophageal reflux disease with esophagitis, unspecified whether hemorrhage 04/19/2022     Urinary retention 04/19/2022     Weight loss 04/19/2022     Resolved Ambulatory Problems     Diagnosis Date Noted     PAVAN (acute kidney injury) (H24) 01/21/2018     Tachycardia 01/21/2018     Respiratory infection 01/21/2018     SOB (shortness of breath) 04/01/2022     Moderate malnutrition (H24) 04/05/2022     No Additional Past Medical History       Current Outpatient Medications:      metFORMIN (GLUCOPHAGE) 500 MG tablet, Take 500 mg by mouth 2 times daily (with meals) Give half tab to equal 250 mg daily., Disp: , Rfl:      acetaminophen (TYLENOL) 325 MG tablet, Take 2 tablets (650 mg) by mouth every 4 hours as needed for mild pain or fever, Disp: , Rfl: 0     albuterol (PROVENTIL) (2.5 MG/3ML) 0.083% neb solution, Take 1 vial (2.5 mg) by nebulization every 6 hours as needed for wheezing, Disp: 90 mL, Rfl: 0     docusate sodium (COLACE) 100 MG capsule, Take 100 mg by mouth daily, Disp: , Rfl:      furosemide (LASIX) 20 MG tablet, Take 1 tablet (20 mg) by mouth daily, Disp: , Rfl: 0     losartan (COZAAR) 25 MG tablet, Take 1 tablet (25 mg) by mouth daily, Disp: , Rfl: 0     metoprolol succinate ER (TOPROL-XL) 25 MG 24 hr tablet, Take 1 tablet (25 mg) by mouth daily, Disp: , Rfl: 0     pantoprazole (PROTONIX) 40 MG EC tablet, Take 1  tablet (40 mg) by mouth 2 times daily (Patient taking differently: Take 20 mg by mouth daily), Disp: , Rfl: 0     rosuvastatin (CRESTOR) 5 MG tablet, Take 1 tablet (5 mg) by mouth every evening, Disp: , Rfl: 0     senna-docusate (SENOKOT-S/PERICOLACE) 8.6-50 MG tablet, Take 1 tablet by mouth 2 times daily, Disp: , Rfl: 0     spironolactone (ALDACTONE) 25 MG tablet, Take 1 tablet (25 mg) by mouth daily, Disp: , Rfl: 0     tamsulosin (FLOMAX) 0.4 MG capsule, Take 0.4 mg by mouth in the morning., Disp: , Rfl:   No Known Allergies    All Meds and Allergies reviewed in the record at the facility and is the most up-to-date    REVIEW OF SYSTEMS:   Review of Systems  Seven-point review of systems reviewed.  Pertinent positives in HPI.    PHYSICAL EXAMINATION:  Physical Exam     Vital signs: /75   Pulse 55   Temp 97.6  F (36.4  C)   Resp 16   Ht 1.829 m (6')   Wt 98 kg (216 lb)   SpO2 94%   BMI 29.29 kg/m    General: Awake, Alert, oriented x1, sitting up in wheelchair, follows simple commands  HEENT: Pink conjunctiva, moist oral mucosa  NECK: Supple  BACK: No kyphosis of the thoracic spine  EXTREMITIES: Moves both upper and lower extremities with generalized weakness.   NEUROLOGIC: Intact  PSYCHIATRIC: Cognitive impairment noted.      Labs:  All labs reviewed in the nursing home record and Epic   @  Lab Results   Component Value Date    WBC 7.7 04/05/2022     Lab Results   Component Value Date    RBC 3.97 04/05/2022     Lab Results   Component Value Date    HGB 8.5 04/06/2022     Lab Results   Component Value Date    HCT 30.6 04/05/2022     Lab Results   Component Value Date    MCV 77 04/05/2022     Lab Results   Component Value Date    MCH 21.9 04/05/2022     Lab Results   Component Value Date    MCHC 28.4 04/05/2022     Lab Results   Component Value Date    RDW 27.3 04/05/2022     Lab Results   Component Value Date     04/05/2022        @Last Comprehensive Metabolic Panel:  Sodium   Date Value Ref Range  Status   09/14/2023 133 (L) 136 - 145 mmol/L Final     Potassium   Date Value Ref Range Status   09/14/2023 4.2 3.4 - 5.3 mmol/L Final   04/11/2022 3.9 3.5 - 5.0 mmol/L Final     Chloride   Date Value Ref Range Status   09/14/2023 97 (L) 98 - 107 mmol/L Final   04/11/2022 103 98 - 107 mmol/L Final     Carbon Dioxide (CO2)   Date Value Ref Range Status   09/14/2023 26 22 - 29 mmol/L Final   04/11/2022 23 22 - 31 mmol/L Final     Anion Gap   Date Value Ref Range Status   09/14/2023 10 7 - 15 mmol/L Final   04/11/2022 12 5 - 18 mmol/L Final     Glucose   Date Value Ref Range Status   09/14/2023 319 (H) 70 - 99 mg/dL Final   04/11/2022 103 70 - 125 mg/dL Final     Urea Nitrogen   Date Value Ref Range Status   09/14/2023 15.5 8.0 - 23.0 mg/dL Final   04/11/2022 13 8 - 28 mg/dL Final     Creatinine   Date Value Ref Range Status   09/14/2023 1.14 0.67 - 1.17 mg/dL Final     GFR Estimate   Date Value Ref Range Status   09/14/2023 63 >60 mL/min/1.73m2 Final   03/17/2021 >60 >60 mL/min/1.73m2 Final     Calcium   Date Value Ref Range Status   09/14/2023 9.3 8.8 - 10.2 mg/dL Final       This note has been dictated using voice recognition software. Any grammatical or context distortions are unintentional and inherent to the software    Electronically signed by: Meseret Nolen, ABRAHAM       Sincerely,        Meseret Nolen, NP

## 2024-01-04 NOTE — PROGRESS NOTES
[facility] G[facility]IATRIC SERVICES    Code Status:  DNR   Visit Type:   Chief Complaint   Patient presents with    [facility]     Facility:  [facility] [place] () [[id number]]           History of Present Illness: Solitario Benjamin is a 86 year old male who I am seeing today for Annual [facility] Nursing Home visit. Past medical history includes GERD with large hiatal hernia, gastritis, hypertension, acute on chronic systolic heart failure, diabetes mellitus type 2, prostate CA and weight loss.    Today patient sitting up in wheelchair. Patient with underlying cognitive impairment and is a poor historian.  Laboratory returned today with hemoglobin A1c of 13.3.  Patient with known diabetes mellitus type 2 diet controlled.  He is very noncompliant with diabetic diet.  I did talk with his son today about starting medication.  He is in agreement.  TSH 5.5.  No known hypothyroidism.  Will monitor in elderly patients.    Assessment/plan:    Cognitive impairment  -Advanced.    Type 2 diabetes mellitus without complication, without long-term current use of insulin (H)  -Hemoglobin A1c 13.3.  -Start metformin 250 mg daily.  -Blood sugar checks twice daily.  -Consistent carb diet.  -Follow-up BMP in 1 week.    Elevated TSH   -History of thyroid nodule.  -TSH 5.5.  -Repeat 4 weeks.    Active Ambulatory Problems     Diagnosis Date Noted    Lactic acid acidosis 01/21/2018    Accelerated hypertension 01/21/2018    Type 2 diabetes mellitus without complication, without long-term current use of insulin (H) 01/21/2018    Interstitial pneumonia (H) 01/22/2018    Essential hypertension 04/01/2022    COPD w Centrilobular emphysema  04/01/2022    Dyspnea on exertion 04/01/2022    Symptomatic anemia 04/01/2022    Ecchymosis of forearm 04/01/2022    Acute on chronic systolic heart failure (H) 04/02/2022    NSTEMI -- demand ischemia 04/02/2022    Iron deficiency anemia due to chronic blood loss 04/04/2022    Prostate cancer (H)  04/04/2022    Acute blood loss anemia 04/04/2022    Thyroid nodule 04/07/2022    Obstructive sleep apnea syndrome 04/07/2022    Mixed hyperlipidemia 04/07/2022    Mild cognitive disorder 04/07/2022    History of malignant neoplasm of prostate 04/07/2022    History of fall 04/07/2022    Gastroesophageal reflux disease without esophagitis 04/07/2022    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    Gastrointestinal hemorrhage associated with peptic ulcer 04/19/2022    Gastroesophageal reflux disease with esophagitis, unspecified whether hemorrhage 04/19/2022    Urinary retention 04/19/2022    Weight loss 04/19/2022     Resolved Ambulatory Problems     Diagnosis Date Noted    PAVAN (acute kidney injury) (H24) 01/21/2018    Tachycardia 01/21/2018    Respiratory infection 01/21/2018    SOB (shortness of breath) 04/01/2022    Moderate malnutrition (H24) 04/05/2022     No Additional Past Medical History       Current Outpatient Medications:     metFORMIN (GLUCOPHAGE) 500 MG tablet, Take 500 mg by mouth 2 times daily (with meals) Give half tab to equal 250 mg daily., Disp: , Rfl:     acetaminophen (TYLENOL) 325 MG tablet, Take 2 tablets (650 mg) by mouth every 4 hours as needed for mild pain or fever, Disp: , Rfl: 0    albuterol (PROVENTIL) (2.5 MG/3ML) 0.083% neb solution, Take 1 vial (2.5 mg) by nebulization every 6 hours as needed for wheezing, Disp: 90 mL, Rfl: 0    docusate sodium (COLACE) 100 MG capsule, Take 100 mg by mouth daily, Disp: , Rfl:     furosemide (LASIX) 20 MG tablet, Take 1 tablet (20 mg) by mouth daily, Disp: , Rfl: 0    losartan (COZAAR) 25 MG tablet, Take 1 tablet (25 mg) by mouth daily, Disp: , Rfl: 0    metoprolol succinate ER (TOPROL-XL) 25 MG 24 hr tablet, Take 1 tablet (25 mg) by mouth daily, Disp: , Rfl: 0    pantoprazole (PROTONIX) 40 MG EC tablet, Take 1 tablet (40 mg) by mouth 2 times daily (Patient taking differently: Take 20 mg by mouth daily), Disp: , Rfl: 0    rosuvastatin (CRESTOR) 5 MG tablet,  Take 1 tablet (5 mg) by mouth every evening, Disp: , Rfl: 0    senna-docusate (SENOKOT-S/PERICOLACE) 8.6-50 MG tablet, Take 1 tablet by mouth 2 times daily, Disp: , Rfl: 0    spironolactone (ALDACTONE) 25 MG tablet, Take 1 tablet (25 mg) by mouth daily, Disp: , Rfl: 0    tamsulosin (FLOMAX) 0.4 MG capsule, Take 0.4 mg by mouth in the morning., Disp: , Rfl:   No Known Allergies    All Meds and Allergies reviewed in the record at the facility and is the most up-to-date    REVIEW OF SYSTEMS:   Review of Systems  Seven-point review of systems reviewed.  Pertinent positives in HPI.    PHYSICAL EXAMINATION:  Physical Exam     Vital signs: /75   Pulse 55   Temp 97.6  F (36.4  C)   Resp 16   Ht 1.829 m (6')   Wt 98 kg (216 lb)   SpO2 94%   BMI 29.29 kg/m    General: Awake, Alert, oriented x1, sitting up in wheelchair, follows simple commands  HEENT: Pink conjunctiva, moist oral mucosa  NECK: Supple  BACK: No kyphosis of the thoracic spine  EXTREMITIES: Moves both upper and lower extremities with generalized weakness.   NEUROLOGIC: Intact  PSYCHIATRIC: Cognitive impairment noted.      Labs:  All labs reviewed in the nursing home record and Epic   @  Lab Results   Component Value Date    WBC 7.7 04/05/2022     Lab Results   Component Value Date    RBC 3.97 04/05/2022     Lab Results   Component Value Date    HGB 8.5 04/06/2022     Lab Results   Component Value Date    HCT 30.6 04/05/2022     Lab Results   Component Value Date    MCV 77 04/05/2022     Lab Results   Component Value Date    MCH 21.9 04/05/2022     Lab Results   Component Value Date    MCHC 28.4 04/05/2022     Lab Results   Component Value Date    RDW 27.3 04/05/2022     Lab Results   Component Value Date     04/05/2022        @Last Comprehensive Metabolic Panel:  Sodium   Date Value Ref Range Status   09/14/2023 133 (L) 136 - 145 mmol/L Final     Potassium   Date Value Ref Range Status   09/14/2023 4.2 3.4 - 5.3 mmol/L Final   04/11/2022 3.9  3.5 - 5.0 mmol/L Final     Chloride   Date Value Ref Range Status   09/14/2023 97 (L) 98 - 107 mmol/L Final   04/11/2022 103 98 - 107 mmol/L Final     Carbon Dioxide (CO2)   Date Value Ref Range Status   09/14/2023 26 22 - 29 mmol/L Final   04/11/2022 23 22 - 31 mmol/L Final     Anion Gap   Date Value Ref Range Status   09/14/2023 10 7 - 15 mmol/L Final   04/11/2022 12 5 - 18 mmol/L Final     Glucose   Date Value Ref Range Status   09/14/2023 319 (H) 70 - 99 mg/dL Final   04/11/2022 103 70 - 125 mg/dL Final     Urea Nitrogen   Date Value Ref Range Status   09/14/2023 15.5 8.0 - 23.0 mg/dL Final   04/11/2022 13 8 - 28 mg/dL Final     Creatinine   Date Value Ref Range Status   09/14/2023 1.14 0.67 - 1.17 mg/dL Final     GFR Estimate   Date Value Ref Range Status   09/14/2023 63 >60 mL/min/1.73m2 Final   03/17/2021 >60 >60 mL/min/1.73m2 Final     Calcium   Date Value Ref Range Status   09/14/2023 9.3 8.8 - 10.2 mg/dL Final       This note has been dictated using voice recognition software. Any grammatical or context distortions are unintentional and inherent to the software    Electronically signed by: Meseret Nolen, CNP

## 2024-01-10 ENCOUNTER — LAB REQUISITION (OUTPATIENT)
Dept: LAB | Facility: CLINIC | Age: 89
End: 2024-01-10
Payer: COMMERCIAL

## 2024-01-10 DIAGNOSIS — E11.9 TYPE 2 DIABETES MELLITUS WITHOUT COMPLICATIONS (H): ICD-10-CM

## 2024-01-11 LAB
ANION GAP SERPL CALCULATED.3IONS-SCNC: 12 MMOL/L (ref 7–15)
BUN SERPL-MCNC: 13.5 MG/DL (ref 8–23)
CALCIUM SERPL-MCNC: 9.1 MG/DL (ref 8.8–10.2)
CHLORIDE SERPL-SCNC: 97 MMOL/L (ref 98–107)
CREAT SERPL-MCNC: 1.12 MG/DL (ref 0.67–1.17)
DEPRECATED HCO3 PLAS-SCNC: 25 MMOL/L (ref 22–29)
EGFRCR SERPLBLD CKD-EPI 2021: 64 ML/MIN/1.73M2
GLUCOSE SERPL-MCNC: 269 MG/DL (ref 70–99)
POTASSIUM SERPL-SCNC: 3.8 MMOL/L (ref 3.4–5.3)
SODIUM SERPL-SCNC: 134 MMOL/L (ref 135–145)

## 2024-01-11 PROCEDURE — 80048 BASIC METABOLIC PNL TOTAL CA: CPT | Mod: ORL | Performed by: NURSE PRACTITIONER

## 2024-01-11 PROCEDURE — 36415 COLL VENOUS BLD VENIPUNCTURE: CPT | Mod: ORL | Performed by: NURSE PRACTITIONER

## 2024-01-11 PROCEDURE — P9604 ONE-WAY ALLOW PRORATED TRIP: HCPCS | Mod: ORL | Performed by: NURSE PRACTITIONER

## 2024-01-15 ENCOUNTER — DOCUMENTATION ONLY (OUTPATIENT)
Dept: GERIATRICS | Facility: CLINIC | Age: 89
End: 2024-01-15
Payer: COMMERCIAL

## 2024-02-07 ENCOUNTER — NURSING HOME VISIT (OUTPATIENT)
Dept: GERIATRICS | Facility: CLINIC | Age: 89
End: 2024-02-07
Payer: COMMERCIAL

## 2024-02-07 VITALS
TEMPERATURE: 98 F | RESPIRATION RATE: 16 BRPM | BODY MASS INDEX: 28.94 KG/M2 | DIASTOLIC BLOOD PRESSURE: 75 MMHG | HEIGHT: 72 IN | OXYGEN SATURATION: 97 % | HEART RATE: 75 BPM | SYSTOLIC BLOOD PRESSURE: 137 MMHG | WEIGHT: 213.7 LBS

## 2024-02-07 DIAGNOSIS — E11.9 TYPE 2 DIABETES MELLITUS WITHOUT COMPLICATION, WITHOUT LONG-TERM CURRENT USE OF INSULIN (H): ICD-10-CM

## 2024-02-07 DIAGNOSIS — R41.89 COGNITIVE IMPAIRMENT: Primary | ICD-10-CM

## 2024-02-07 PROCEDURE — 99309 SBSQ NF CARE MODERATE MDM 30: CPT | Performed by: NURSE PRACTITIONER

## 2024-02-07 NOTE — LETTER
2/7/2024        RE: [facility]  [address]  [place] [address]        [facility]    Code Status:  DNR   Visit Type:   Chief Complaint   Patient presents with     LTC ACUTE     Facility:  [facility] [place] () [[id number]]           History of Present Illness: Solitario Benjamin is a 86 year old male who I am seeing today for acute care visit. Past medical history includes GERD with large hiatal hernia, gastritis, hypertension, acute on chronic systolic heart failure, diabetes mellitus type 2, prostate CA and weight loss.    Today patient sitting up in wheelchair. Patient with underlying cognitive impairment and is a poor historian. Nursing staff reporting continued elevated blood sugars. Hemoglobin A1c of 13.3.  Patient with known diabetes mellitus type 2. Pt non compliant with diabetic diet. Pt recently started on Metformin.        Assessment/plan:    Cognitive impairment  -Advanced.    Type 2 diabetes mellitus without complication, without long-term current use of insulin (H)  -Hemoglobin A1c 13.3.  -Increaase metformin 500 mg BID.   -Blood sugar checks twice daily.  -Consistent carb diet.  -Follow-up BMP.       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     Dyspnea on exertion 04/01/2022     Symptomatic anemia 04/01/2022     Ecchymosis of forearm 04/01/2022     Acute on chronic systolic heart failure (H) 04/02/2022     NSTEMI -- demand ischemia 04/02/2022     Iron deficiency anemia due to chronic blood loss 04/04/2022     Prostate cancer (H) 04/04/2022     Acute blood loss anemia 04/04/2022     Thyroid nodule 04/07/2022     Obstructive sleep  apnea syndrome 04/07/2022     Mixed hyperlipidemia 04/07/2022     Mild cognitive disorder 04/07/2022     History of malignant neoplasm of prostate 04/07/2022     History of fall 04/07/2022     Gastroesophageal reflux disease without esophagitis 04/07/2022     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     Gastrointestinal hemorrhage associated with peptic ulcer 04/19/2022     Gastroesophageal reflux disease with esophagitis, unspecified whether hemorrhage 04/19/2022     Urinary retention 04/19/2022     Weight loss 04/19/2022     Resolved Ambulatory Problems     Diagnosis Date Noted     PAVAN (acute kidney injury) (H24) 01/21/2018     Tachycardia 01/21/2018     Respiratory infection 01/21/2018     SOB (shortness of breath) 04/01/2022     Moderate malnutrition (H24) 04/05/2022     No Additional Past Medical History       Current Outpatient Medications:      acetaminophen (TYLENOL) 325 MG tablet, Take 2 tablets (650 mg) by mouth every 4 hours as needed for mild pain or fever, Disp: , Rfl: 0     albuterol (PROVENTIL) (2.5 MG/3ML) 0.083% neb solution, Take 1 vial (2.5 mg) by nebulization every 6 hours as needed for wheezing, Disp: 90 mL, Rfl: 0     docusate sodium (COLACE) 100 MG capsule, Take 100 mg by mouth daily, Disp: , Rfl:      furosemide (LASIX) 20 MG tablet, Take 1 tablet (20 mg) by mouth daily, Disp: , Rfl: 0     losartan (COZAAR) 25 MG tablet, Take 1 tablet (25 mg) by mouth daily, Disp: , Rfl: 0     metFORMIN (GLUCOPHAGE) 500 MG tablet, Take 500 mg by mouth 2 times daily (with meals), Disp: , Rfl:      metoprolol succinate ER (TOPROL-XL) 25 MG 24 hr tablet, Take 1 tablet (25 mg) by mouth daily, Disp: , Rfl: 0     pantoprazole (PROTONIX) 40 MG EC tablet, Take 1 tablet (40 mg) by mouth 2 times daily (Patient taking differently: Take 20 mg by mouth daily), Disp: , Rfl: 0     rosuvastatin (CRESTOR) 5 MG tablet, Take 1 tablet (5 mg) by mouth every evening, Disp: , Rfl: 0     senna-docusate (SENOKOT-S/PERICOLACE) 8.6-50 MG  tablet, Take 1 tablet by mouth 2 times daily, Disp: , Rfl: 0     spironolactone (ALDACTONE) 25 MG tablet, Take 1 tablet (25 mg) by mouth daily, Disp: , Rfl: 0     tamsulosin (FLOMAX) 0.4 MG capsule, Take 0.4 mg by mouth in the morning., Disp: , Rfl:   No Known Allergies    All Meds and Allergies reviewed in the record at the facility and is the most up-to-date    REVIEW OF SYSTEMS:   Review of Systems  Seven-point review of systems reviewed.  Pertinent positives in HPI.    PHYSICAL EXAMINATION:  Physical Exam     Vital signs: /75   Pulse 75   Temp 98  F (36.7  C)   Resp 16   Ht 1.829 m (6')   Wt 96.9 kg (213 lb 11.2 oz)   SpO2 97%   BMI 28.98 kg/m    General: Awake, Alert, oriented x1, sitting up in wheelchair, follows simple commands  HEENT: Pink conjunctiva, moist oral mucosa  NECK: Supple  BACK: No kyphosis of the thoracic spine  EXTREMITIES: Moves both upper and lower extremities with generalized weakness.   NEUROLOGIC: Intact  PSYCHIATRIC: Cognitive impairment noted.      Labs:  All labs reviewed in the nursing home record and Epic   @  Lab Results   Component Value Date    WBC 7.7 04/05/2022     Lab Results   Component Value Date    RBC 3.97 04/05/2022     Lab Results   Component Value Date    HGB 8.5 04/06/2022     Lab Results   Component Value Date    HCT 30.6 04/05/2022     Lab Results   Component Value Date    MCV 77 04/05/2022     Lab Results   Component Value Date    MCH 21.9 04/05/2022     Lab Results   Component Value Date    MCHC 28.4 04/05/2022     Lab Results   Component Value Date    RDW 27.3 04/05/2022     Lab Results   Component Value Date     04/05/2022        @Last Comprehensive Metabolic Panel:  Sodium   Date Value Ref Range Status   01/11/2024 134 (L) 135 - 145 mmol/L Final     Comment:     Reference intervals for this test were updated on 09/26/2023 to more accurately reflect our healthy population. There may be differences in the flagging of prior results with similar  values performed with this method. Interpretation of those prior results can be made in the context of the updated reference intervals.      Potassium   Date Value Ref Range Status   01/11/2024 3.8 3.4 - 5.3 mmol/L Final   04/11/2022 3.9 3.5 - 5.0 mmol/L Final     Chloride   Date Value Ref Range Status   01/11/2024 97 (L) 98 - 107 mmol/L Final   04/11/2022 103 98 - 107 mmol/L Final     Carbon Dioxide (CO2)   Date Value Ref Range Status   01/11/2024 25 22 - 29 mmol/L Final   04/11/2022 23 22 - 31 mmol/L Final     Anion Gap   Date Value Ref Range Status   01/11/2024 12 7 - 15 mmol/L Final   04/11/2022 12 5 - 18 mmol/L Final     Glucose   Date Value Ref Range Status   01/11/2024 269 (H) 70 - 99 mg/dL Final   04/11/2022 103 70 - 125 mg/dL Final     Urea Nitrogen   Date Value Ref Range Status   01/11/2024 13.5 8.0 - 23.0 mg/dL Final   04/11/2022 13 8 - 28 mg/dL Final     Creatinine   Date Value Ref Range Status   01/11/2024 1.12 0.67 - 1.17 mg/dL Final     GFR Estimate   Date Value Ref Range Status   01/11/2024 64 >60 mL/min/1.73m2 Final   03/17/2021 >60 >60 mL/min/1.73m2 Final     Calcium   Date Value Ref Range Status   01/11/2024 9.1 8.8 - 10.2 mg/dL Final       This note has been dictated using voice recognition software. Any grammatical or context distortions are unintentional and inherent to the software    Electronically signed by: Meseret Nolen CNP       Sincerely,        Meseret Nolen, NP

## 2024-02-08 NOTE — PROGRESS NOTES
[facility]    Code Status:  DNR   Visit Type:   Chief Complaint   Patient presents with    [facility] ACUTE     Facility:  [facility] [place] () [[id number]]           History of Present Illness: Solitario Benjamin is a 86 year old male who I am seeing today for acute care visit. Past medical history includes GERD with large hiatal hernia, gastritis, hypertension, acute on chronic systolic heart failure, diabetes mellitus type 2, prostate CA and weight loss.    Today patient sitting up in wheelchair. Patient with underlying cognitive impairment and is a poor historian. Nursing staff reporting continued elevated blood sugars. Hemoglobin A1c of 13.3.  Patient with known diabetes mellitus type 2. Pt non compliant with diabetic diet. Pt recently started on Metformin.        Assessment/plan:    Cognitive impairment  -Advanced.    Type 2 diabetes mellitus without complication, without long-term current use of insulin (H)  -Hemoglobin A1c 13.3.  -Increaase metformin 500 mg BID.   -Blood sugar checks twice daily.  -Consistent carb diet.  -Follow-up BMP.       Active Ambulatory Problems     Diagnosis Date Noted    Lactic acid acidosis 01/21/2018    Accelerated hypertension 01/21/2018    Type 2 diabetes mellitus without complication, without long-term current use of insulin (H) 01/21/2018    Interstitial pneumonia (H) 01/22/2018    Essential hypertension 04/01/2022    COPD w Centrilobular emphysema  04/01/2022    Dyspnea on exertion 04/01/2022    Symptomatic anemia 04/01/2022    Ecchymosis of forearm 04/01/2022    Acute on chronic systolic heart failure (H) 04/02/2022    NSTEMI -- demand ischemia 04/02/2022    Iron deficiency anemia due to chronic blood loss 04/04/2022    Prostate cancer (H) 04/04/2022    Acute blood loss anemia 04/04/2022    Thyroid nodule 04/07/2022    Obstructive sleep apnea syndrome 04/07/2022    Mixed hyperlipidemia 04/07/2022    Mild cognitive disorder 04/07/2022    History of malignant  neoplasm of prostate 04/07/2022    History of fall 04/07/2022    Gastroesophageal reflux disease without esophagitis 04/07/2022    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    Gastrointestinal hemorrhage associated with peptic ulcer 04/19/2022    Gastroesophageal reflux disease with esophagitis, unspecified whether hemorrhage 04/19/2022    Urinary retention 04/19/2022    Weight loss 04/19/2022     Resolved Ambulatory Problems     Diagnosis Date Noted    PAVAN (acute kidney injury) (H24) 01/21/2018    Tachycardia 01/21/2018    Respiratory infection 01/21/2018    SOB (shortness of breath) 04/01/2022    Moderate malnutrition (H24) 04/05/2022     No Additional Past Medical History       Current Outpatient Medications:     acetaminophen (TYLENOL) 325 MG tablet, Take 2 tablets (650 mg) by mouth every 4 hours as needed for mild pain or fever, Disp: , Rfl: 0    albuterol (PROVENTIL) (2.5 MG/3ML) 0.083% neb solution, Take 1 vial (2.5 mg) by nebulization every 6 hours as needed for wheezing, Disp: 90 mL, Rfl: 0    docusate sodium (COLACE) 100 MG capsule, Take 100 mg by mouth daily, Disp: , Rfl:     furosemide (LASIX) 20 MG tablet, Take 1 tablet (20 mg) by mouth daily, Disp: , Rfl: 0    losartan (COZAAR) 25 MG tablet, Take 1 tablet (25 mg) by mouth daily, Disp: , Rfl: 0    metFORMIN (GLUCOPHAGE) 500 MG tablet, Take 500 mg by mouth 2 times daily (with meals), Disp: , Rfl:     metoprolol succinate ER (TOPROL-XL) 25 MG 24 hr tablet, Take 1 tablet (25 mg) by mouth daily, Disp: , Rfl: 0    pantoprazole (PROTONIX) 40 MG EC tablet, Take 1 tablet (40 mg) by mouth 2 times daily (Patient taking differently: Take 20 mg by mouth daily), Disp: , Rfl: 0    rosuvastatin (CRESTOR) 5 MG tablet, Take 1 tablet (5 mg) by mouth every evening, Disp: , Rfl: 0    senna-docusate (SENOKOT-S/PERICOLACE) 8.6-50 MG tablet, Take 1 tablet by mouth 2 times daily, Disp: , Rfl: 0    spironolactone (ALDACTONE) 25 MG tablet, Take 1 tablet (25 mg) by mouth daily, Disp: ,  Rfl: 0    tamsulosin (FLOMAX) 0.4 MG capsule, Take 0.4 mg by mouth in the morning., Disp: , Rfl:   No Known Allergies    All Meds and Allergies reviewed in the record at the facility and is the most up-to-date    REVIEW OF SYSTEMS:   Review of Systems  Seven-point review of systems reviewed.  Pertinent positives in HPI.    PHYSICAL EXAMINATION:  Physical Exam     Vital signs: /75   Pulse 75   Temp 98  F (36.7  C)   Resp 16   Ht 1.829 m (6')   Wt 96.9 kg (213 lb 11.2 oz)   SpO2 97%   BMI 28.98 kg/m    General: Awake, Alert, oriented x1, sitting up in wheelchair, follows simple commands  HEENT: Pink conjunctiva, moist oral mucosa  NECK: Supple  BACK: No kyphosis of the thoracic spine  EXTREMITIES: Moves both upper and lower extremities with generalized weakness.   NEUROLOGIC: Intact  PSYCHIATRIC: Cognitive impairment noted.      Labs:  All labs reviewed in the nursing home record and Epic   @  Lab Results   Component Value Date    WBC 7.7 04/05/2022     Lab Results   Component Value Date    RBC 3.97 04/05/2022     Lab Results   Component Value Date    HGB 8.5 04/06/2022     Lab Results   Component Value Date    HCT 30.6 04/05/2022     Lab Results   Component Value Date    MCV 77 04/05/2022     Lab Results   Component Value Date    MCH 21.9 04/05/2022     Lab Results   Component Value Date    MCHC 28.4 04/05/2022     Lab Results   Component Value Date    RDW 27.3 04/05/2022     Lab Results   Component Value Date     04/05/2022        @Last Comprehensive Metabolic Panel:  Sodium   Date Value Ref Range Status   01/11/2024 134 (L) 135 - 145 mmol/L Final     Comment:     Reference intervals for this test were updated on 09/26/2023 to more accurately reflect our healthy population. There may be differences in the flagging of prior results with similar values performed with this method. Interpretation of those prior results can be made in the context of the updated reference intervals.      Potassium    Date Value Ref Range Status   01/11/2024 3.8 3.4 - 5.3 mmol/L Final   04/11/2022 3.9 3.5 - 5.0 mmol/L Final     Chloride   Date Value Ref Range Status   01/11/2024 97 (L) 98 - 107 mmol/L Final   04/11/2022 103 98 - 107 mmol/L Final     Carbon Dioxide (CO2)   Date Value Ref Range Status   01/11/2024 25 22 - 29 mmol/L Final   04/11/2022 23 22 - 31 mmol/L Final     Anion Gap   Date Value Ref Range Status   01/11/2024 12 7 - 15 mmol/L Final   04/11/2022 12 5 - 18 mmol/L Final     Glucose   Date Value Ref Range Status   01/11/2024 269 (H) 70 - 99 mg/dL Final   04/11/2022 103 70 - 125 mg/dL Final     Urea Nitrogen   Date Value Ref Range Status   01/11/2024 13.5 8.0 - 23.0 mg/dL Final   04/11/2022 13 8 - 28 mg/dL Final     Creatinine   Date Value Ref Range Status   01/11/2024 1.12 0.67 - 1.17 mg/dL Final     GFR Estimate   Date Value Ref Range Status   01/11/2024 64 >60 mL/min/1.73m2 Final   03/17/2021 >60 >60 mL/min/1.73m2 Final     Calcium   Date Value Ref Range Status   01/11/2024 9.1 8.8 - 10.2 mg/dL Final       This note has been dictated using voice recognition software. Any grammatical or context distortions are unintentional and inherent to the software    Electronically signed by: Meseret Nolen, CNP

## 2024-03-26 VITALS
HEART RATE: 64 BPM | RESPIRATION RATE: 16 BRPM | SYSTOLIC BLOOD PRESSURE: 124 MMHG | BODY MASS INDEX: 29.26 KG/M2 | OXYGEN SATURATION: 96 % | TEMPERATURE: 97.9 F | DIASTOLIC BLOOD PRESSURE: 68 MMHG | HEIGHT: 72 IN | WEIGHT: 216 LBS

## 2024-03-26 NOTE — PROGRESS NOTES
[facility] GERIATRIC SERVICES       Patient Solitario Benjamin  MRN: [id number]        Reason for Visit     Chief Complaint   Patient presents with    [facility] Regulatory       Code Status     DNR only    Assessment     Cognitive impairment  COPD  Hypertension  Type 2 diabetes uncontrolled with r/c A1C 13.3  Chronic anemia due to blood loss  GERD  CHF  History of BPH/ h/o of prostate ca  HECTOR  Hyponatremia   Generalized weakness    Plan     Pt is a resident of long-term care  Denies a history of COPD but currently stable and not hypoxic   has PRN MDI but none scheduled and has no sob at rest  Diabetes control adequate on no medications  Last A1c was >13  Started on metformin  Will increase am dose to 1000 mg and continue 500 mg at hs  R/c aic in 6 weeks  Staff report increased po intake.  At baseline has cognitive impairment and remains confused at baseline  Has limited insight  Has some  behavior concern per staff mostly related to inappropriate sexual behavior in public places  ; mood is stable  Significant weakness and currently requiring assistance with ADLs and is wheelchair-bound  ON sertraline and mood is stable  CHF controlled adequately with low-dose Lasix and spironolactone with no weight gain  Htn managed with losartan and metoprolol  Cont flomax for BPH; no voiding concerns  On lower dose of PPI for gerd  R/c labs reviewed-na low but stable at 134  Cont current care plan        History     Patient is a very pleasant 87 year old male who is a resident of long-term care  PT  has no recall of recent events due to confusion also noted to have limited insight  Mood is pleasant but confused with limited recall  Staff reporting some ongoing behaviors relating to sexual inappropriate behaviors especially in public area  So far he has been redirectable  Has become weaker and chair bound  Has COPD but denies any wheezing  Using only her as needed albuterol.  No concerns per staff  Diabetes control was reviewed at the  request of nursing he has been started on metformin due to an elevated A1c of 13.3.        Past Medical & Surgical History     PAST MEDICAL HISTORY:   Past Medical History:   Diagnosis Date    Accelerated hypertension 1/21/2018    Acute on chronic systolic heart failure (H) 4/2/2022    Centrilobular emphysema (H) 4/1/2022    Elevated troponin level not due to acute coronary syndrome 4/2/2022    Type 2 diabetes mellitus without complication, without long-term current use of insulin (H) 1/21/2018      PAST SURGICAL HISTORY:   has a past surgical history that includes Esophagoscopy, gastroscopy, duodenoscopy (EGD), combined (N/A, 4/4/2022) and Colonoscopy (N/A, 4/4/2022).      Past Social History     Reviewed,  reports that he quit smoking about 41 years ago. His smoking use included cigarettes and cigarettes. He has quit using smokeless tobacco. He reports that he does not drink alcohol and does not use drugs.    Family History     Reviewed, and family history includes Myocardial Infarction in his brother, father, mother, and sister; Sleep Apnea in his child.    Medication List     Current Outpatient Medications   Medication    acetaminophen (TYLENOL) 325 MG tablet    docusate sodium (COLACE) 100 MG capsule    furosemide (LASIX) 20 MG tablet    losartan (COZAAR) 25 MG tablet    metFORMIN (GLUCOPHAGE) 500 MG tablet    metoprolol succinate ER (TOPROL-XL) 25 MG 24 hr tablet    pantoprazole (PROTONIX) 40 MG EC tablet    rosuvastatin (CRESTOR) 5 MG tablet    senna-docusate (SENOKOT-S/PERICOLACE) 8.6-50 MG tablet    spironolactone (ALDACTONE) 25 MG tablet    tamsulosin (FLOMAX) 0.4 MG capsule    albuterol (PROVENTIL) (2.5 MG/3ML) 0.083% neb solution     No current facility-administered medications for this visit.          Allergies     No Known Allergies    Review of Systems   A comprehensive review of 14 systems not done patient has limited insight into his situation and has cognitive impairment      Physical Exam   BP  124/68   Pulse 64   Temp 97.9  F (36.6  C)   Resp 16   Ht 1.829 m (6')   Wt 98 kg (216 lb)   SpO2 96%   BMI 29.29 kg/m     GENERAL: no acute distress. Cooperative in conversation.   obese  HEENT: pupils are equal, round and reactive. Oral mucosa is moist and intact.  RESP:Chest symmetric. Regular respiratory rate. No stridor.  CVS S1S2  ABD: Nondistended, soft.  EXTREMITIES: No lower extremity edema.   NEURO: non focal. Alert and oriented x 2 WITH LIMITED RECALL.   PSYCH: within normal limits. No depression or anxiety.  SKIN: warm dry intact     Labs  Last Comprehensive Metabolic Panel:  Lab Results   Component Value Date     (L) 01/11/2024    POTASSIUM 3.8 01/11/2024    CHLORIDE 97 (L) 01/11/2024    CO2 25 01/11/2024    ANIONGAP 12 01/11/2024     (H) 01/11/2024    BUN 13.5 01/11/2024    CR 1.12 01/11/2024    GFRESTIMATED 64 01/11/2024    GUILLE 9.1 01/11/2024          Electronically signed by    Sailaja Bardales MD

## 2024-03-27 ENCOUNTER — NURSING HOME VISIT (OUTPATIENT)
Dept: GERIATRICS | Facility: CLINIC | Age: 89
End: 2024-03-27
Payer: COMMERCIAL

## 2024-03-27 DIAGNOSIS — Z72.89 INAPPROPRIATE SEXUAL BEHAVIOR: ICD-10-CM

## 2024-03-27 DIAGNOSIS — I10 ESSENTIAL HYPERTENSION: ICD-10-CM

## 2024-03-27 DIAGNOSIS — K21.00 GASTROESOPHAGEAL REFLUX DISEASE WITH ESOPHAGITIS, UNSPECIFIED WHETHER HEMORRHAGE: ICD-10-CM

## 2024-03-27 DIAGNOSIS — E11.9 TYPE 2 DIABETES MELLITUS WITHOUT COMPLICATION, WITHOUT LONG-TERM CURRENT USE OF INSULIN (H): ICD-10-CM

## 2024-03-27 DIAGNOSIS — J43.2 CENTRILOBULAR EMPHYSEMA (H): ICD-10-CM

## 2024-03-27 DIAGNOSIS — R41.89 COGNITIVE IMPAIRMENT: Primary | ICD-10-CM

## 2024-03-27 PROBLEM — C61 PROSTATE CANCER (H): Status: RESOLVED | Noted: 2022-04-04 | Resolved: 2024-03-27

## 2024-03-27 PROBLEM — I50.23 ACUTE ON CHRONIC SYSTOLIC HEART FAILURE (H): Status: RESOLVED | Noted: 2022-04-02 | Resolved: 2024-03-27

## 2024-03-27 PROCEDURE — 99309 SBSQ NF CARE MODERATE MDM 30: CPT | Performed by: FAMILY MEDICINE

## 2024-03-27 NOTE — LETTER
3/27/2024        RE: Solitario Benjamin  [facility]  [address] [address]        [facility]       Patient Solitario Benjamin  MRN: [id number]        Reason for Visit     Chief Complaint   Patient presents with     [facility] Regulatory       Code Status     DNR only    Assessment     Cognitive impairment  COPD  Hypertension  Type 2 diabetes uncontrolled with r/c A1C 13.3  Chronic anemia due to blood loss  GERD  CHF  History of BPH/ h/o of prostate ca  HECTOR  Hyponatremia   Generalized weakness    Plan     Pt is a resident of long-term care  Denies a history of COPD but currently stable and not hypoxic   has PRN MDI but none scheduled and has no sob at rest  Diabetes control adequate on no medications  Last A1c was >13  Started on metformin  Will increase am dose to 1000 mg and continue 500 mg at hs  R/c aic in 6 weeks  Staff report increased po intake.  At baseline has cognitive impairment and remains confused at baseline  Has limited insight  Has some  behavior concern per staff mostly related to inappropriate sexual behavior in public places  ; mood is stable  Significant weakness and currently requiring assistance with ADLs and is wheelchair-bound  ON sertraline and mood is stable  CHF controlled adequately with low-dose Lasix and spironolactone with no weight gain  Htn managed with losartan and metoprolol  Cont flomax for BPH; no voiding concerns  On lower dose of PPI for gerd  R/c labs reviewed-na low but stable at 134  Cont current care plan        History     Patient is a very pleasant 87 year old male who is a resident of long-term care  PT  has no recall of recent events due to confusion also noted to have limited insight  Mood is pleasant but confused with limited recall  Staff reporting some ongoing behaviors relating to sexual inappropriate behaviors especially in public area  So far he has been redirectable  Has become weaker and chair bound  Has COPD but  denies any wheezing  Using only her as needed albuterol.  No concerns per staff  Diabetes control was reviewed at the request of nursing he has been started on metformin due to an elevated A1c of 13.3.        Past Medical & Surgical History     PAST MEDICAL HISTORY:   Past Medical History:   Diagnosis Date     Accelerated hypertension 1/21/2018     Acute on chronic systolic heart failure (H) 4/2/2022     Centrilobular emphysema (H) 4/1/2022     Elevated troponin level not due to acute coronary syndrome 4/2/2022     Type 2 diabetes mellitus without complication, without long-term current use of insulin (H) 1/21/2018      PAST SURGICAL HISTORY:   has a past surgical history that includes Esophagoscopy, gastroscopy, duodenoscopy (EGD), combined (N/A, 4/4/2022) and Colonoscopy (N/A, 4/4/2022).      Past Social History     Reviewed,  reports that he quit smoking about 41 years ago. His smoking use included cigarettes and cigarettes. He has quit using smokeless tobacco. He reports that he does not drink alcohol and does not use drugs.    Family History     Reviewed, and family history includes Myocardial Infarction in his brother, father, mother, and sister; Sleep Apnea in his child.    Medication List     Current Outpatient Medications   Medication     acetaminophen (TYLENOL) 325 MG tablet     docusate sodium (COLACE) 100 MG capsule     furosemide (LASIX) 20 MG tablet     losartan (COZAAR) 25 MG tablet     metFORMIN (GLUCOPHAGE) 500 MG tablet     metoprolol succinate ER (TOPROL-XL) 25 MG 24 hr tablet     pantoprazole (PROTONIX) 40 MG EC tablet     rosuvastatin (CRESTOR) 5 MG tablet     senna-docusate (SENOKOT-S/PERICOLACE) 8.6-50 MG tablet     spironolactone (ALDACTONE) 25 MG tablet     tamsulosin (FLOMAX) 0.4 MG capsule     albuterol (PROVENTIL) (2.5 MG/3ML) 0.083% neb solution     No current facility-administered medications for this visit.          Allergies     No Known Allergies    Review of Systems   A  comprehensive review of 14 systems not done patient has limited insight into his situation and has cognitive impairment      Physical Exam   /68   Pulse 64   Temp 97.9  F (36.6  C)   Resp 16   Ht 1.829 m (6')   Wt 98 kg (216 lb)   SpO2 96%   BMI 29.29 kg/m     GENERAL: no acute distress. Cooperative in conversation.   obese  HEENT: pupils are equal, round and reactive. Oral mucosa is moist and intact.  RESP:Chest symmetric. Regular respiratory rate. No stridor.  CVS S1S2  ABD: Nondistended, soft.  EXTREMITIES: No lower extremity edema.   NEURO: non focal. Alert and oriented x 2 WITH LIMITED RECALL.   PSYCH: within normal limits. No depression or anxiety.  SKIN: warm dry intact     Labs  Last Comprehensive Metabolic Panel:  Lab Results   Component Value Date     (L) 01/11/2024    POTASSIUM 3.8 01/11/2024    CHLORIDE 97 (L) 01/11/2024    CO2 25 01/11/2024    ANIONGAP 12 01/11/2024     (H) 01/11/2024    BUN 13.5 01/11/2024    CR 1.12 01/11/2024    GFRESTIMATED 64 01/11/2024    GUILLE 9.1 01/11/2024          Electronically signed by    Sailaja Bardales MD                           Sincerely,        LINWOOD Zendejas

## 2024-05-13 ENCOUNTER — LAB REQUISITION (OUTPATIENT)
Dept: LAB | Facility: CLINIC | Age: 89
End: 2024-05-13
Payer: COMMERCIAL

## 2024-05-13 DIAGNOSIS — I50.22 CHRONIC SYSTOLIC (CONGESTIVE) HEART FAILURE (H): ICD-10-CM

## 2024-05-13 DIAGNOSIS — U07.1 COVID-19: ICD-10-CM

## 2024-05-13 DIAGNOSIS — R26.2 DIFFICULTY IN WALKING, NOT ELSEWHERE CLASSIFIED: ICD-10-CM

## 2024-05-13 DIAGNOSIS — R32 UNSPECIFIED URINARY INCONTINENCE: ICD-10-CM

## 2024-05-13 DIAGNOSIS — I73.89 OTHER SPECIFIED PERIPHERAL VASCULAR DISEASES (H): ICD-10-CM

## 2024-05-14 LAB
ANION GAP SERPL CALCULATED.3IONS-SCNC: 13 MMOL/L (ref 7–15)
BUN SERPL-MCNC: 16.3 MG/DL (ref 8–23)
CALCIUM SERPL-MCNC: 9.1 MG/DL (ref 8.8–10.2)
CHLORIDE SERPL-SCNC: 102 MMOL/L (ref 98–107)
CREAT SERPL-MCNC: 1.27 MG/DL (ref 0.67–1.17)
DEPRECATED HCO3 PLAS-SCNC: 23 MMOL/L (ref 22–29)
EGFRCR SERPLBLD CKD-EPI 2021: 55 ML/MIN/1.73M2
GLUCOSE SERPL-MCNC: 152 MG/DL (ref 70–99)
HBA1C MFR BLD: 8.3 %
POTASSIUM SERPL-SCNC: 4.1 MMOL/L (ref 3.4–5.3)
SODIUM SERPL-SCNC: 138 MMOL/L (ref 135–145)
TSH SERPL DL<=0.005 MIU/L-ACNC: 5.95 UIU/ML (ref 0.3–4.2)

## 2024-05-14 PROCEDURE — 83036 HEMOGLOBIN GLYCOSYLATED A1C: CPT | Mod: ORL | Performed by: FAMILY MEDICINE

## 2024-05-14 PROCEDURE — 80048 BASIC METABOLIC PNL TOTAL CA: CPT | Mod: ORL | Performed by: FAMILY MEDICINE

## 2024-05-14 PROCEDURE — 36415 COLL VENOUS BLD VENIPUNCTURE: CPT | Mod: ORL | Performed by: FAMILY MEDICINE

## 2024-05-14 PROCEDURE — P9603 ONE-WAY ALLOW PRORATED MILES: HCPCS | Mod: ORL | Performed by: FAMILY MEDICINE

## 2024-05-14 PROCEDURE — 84443 ASSAY THYROID STIM HORMONE: CPT | Mod: ORL | Performed by: FAMILY MEDICINE

## 2024-05-15 ENCOUNTER — NURSING HOME VISIT (OUTPATIENT)
Dept: GERIATRICS | Facility: CLINIC | Age: 89
End: 2024-05-15
Payer: COMMERCIAL

## 2024-05-15 VITALS
TEMPERATURE: 98.1 F | DIASTOLIC BLOOD PRESSURE: 62 MMHG | SYSTOLIC BLOOD PRESSURE: 117 MMHG | BODY MASS INDEX: 28.74 KG/M2 | OXYGEN SATURATION: 95 % | WEIGHT: 212.2 LBS | RESPIRATION RATE: 16 BRPM | HEIGHT: 72 IN | HEART RATE: 72 BPM

## 2024-05-15 DIAGNOSIS — K21.00 GASTROESOPHAGEAL REFLUX DISEASE WITH ESOPHAGITIS, UNSPECIFIED WHETHER HEMORRHAGE: ICD-10-CM

## 2024-05-15 DIAGNOSIS — J43.2 CENTRILOBULAR EMPHYSEMA (H): ICD-10-CM

## 2024-05-15 DIAGNOSIS — E11.9 TYPE 2 DIABETES MELLITUS WITHOUT COMPLICATION, WITHOUT LONG-TERM CURRENT USE OF INSULIN (H): ICD-10-CM

## 2024-05-15 DIAGNOSIS — R41.89 COGNITIVE IMPAIRMENT: Primary | ICD-10-CM

## 2024-05-15 DIAGNOSIS — I10 ESSENTIAL HYPERTENSION: ICD-10-CM

## 2024-05-15 PROCEDURE — 99309 SBSQ NF CARE MODERATE MDM 30: CPT | Performed by: NURSE PRACTITIONER

## 2024-05-15 NOTE — LETTER
5/15/2024        RE: [facility]  [address]  [place] [address]        [facility]    Code Status:  DNR   Visit Type:   Chief Complaint   Patient presents with     [facility] Regulatory     Facility:  [facility] () [[id number]]           History of Present Illness: Solitario Benjamin is a 86 year old male who I am seeing today for regulatory visit. Past medical history includes GERD with large hiatal hernia, gastritis, hypertension, acute on chronic systolic heart failure, diabetes mellitus type 2, prostate CA and weight loss.    Today patient sitting up in bedside chair.  Patient with underlying cognitive impairment and is a poor historian. DM2. He continues on Metformin. Pt noncompliant with diet. Blood sugars 150's to occasional 200s. No SOB or CP.       Assessment/plan:    Cognitive impairment  -Advanced.    Type 2 diabetes mellitus without complication, without long-term current use of insulin (H)  -Hemoglobin A1c 13.3.  -metformin 1000 mg in am and 500 mg in evening.   -Blood sugar checks twice daily.  -Consistent carb diet.        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     Dyspnea on exertion 04/01/2022     Symptomatic anemia 04/01/2022     Ecchymosis of forearm 04/01/2022     NSTEMI -- demand ischemia 04/02/2022     Iron deficiency anemia due to chronic blood loss 04/04/2022     Acute blood loss anemia 04/04/2022     Thyroid nodule 04/07/2022     Obstructive sleep apnea syndrome 04/07/2022     Mixed hyperlipidemia 04/07/2022     Mild cognitive disorder 04/07/2022     History of malignant neoplasm of prostate 04/07/2022      History of fall 04/07/2022     Gastroesophageal reflux disease without esophagitis 04/07/2022     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     Gastrointestinal hemorrhage associated with peptic ulcer 04/19/2022     Gastroesophageal reflux disease with esophagitis, unspecified whether hemorrhage 04/19/2022     Urinary retention 04/19/2022     Weight loss 04/19/2022     Resolved Ambulatory Problems     Diagnosis Date Noted     PAVAN (acute kidney injury) (H24) 01/21/2018     Tachycardia 01/21/2018     Respiratory infection 01/21/2018     SOB (shortness of breath) 04/01/2022     Acute on chronic systolic heart failure (H) 04/02/2022     Prostate cancer (H) 04/04/2022     Moderate malnutrition (H24) 04/05/2022     No Additional Past Medical History       Current Outpatient Medications:      acetaminophen (TYLENOL) 325 MG tablet, Take 2 tablets (650 mg) by mouth every 4 hours as needed for mild pain or fever, Disp: , Rfl: 0     docusate sodium (COLACE) 100 MG capsule, Take 100 mg by mouth daily, Disp: , Rfl:      furosemide (LASIX) 20 MG tablet, Take 1 tablet (20 mg) by mouth daily, Disp: , Rfl: 0     losartan (COZAAR) 25 MG tablet, Take 1 tablet (25 mg) by mouth daily, Disp: , Rfl: 0     metFORMIN (GLUCOPHAGE) 500 MG tablet, Take 500 mg by mouth 2 times daily (with meals) Increased am dose to 1gm, Disp: , Rfl:      metoprolol succinate ER (TOPROL-XL) 25 MG 24 hr tablet, Take 1 tablet (25 mg) by mouth daily, Disp: , Rfl: 0     pantoprazole (PROTONIX) 40 MG EC tablet, Take 1 tablet (40 mg) by mouth 2 times daily (Patient taking differently: Take 20 mg by mouth daily), Disp: , Rfl: 0     rosuvastatin (CRESTOR) 5 MG tablet, Take 1 tablet (5 mg) by mouth every evening, Disp: , Rfl: 0     senna-docusate (SENOKOT-S/PERICOLACE) 8.6-50 MG tablet, Take 1 tablet by mouth 2 times daily, Disp: , Rfl: 0     spironolactone (ALDACTONE) 25 MG tablet, Take 1 tablet (25 mg) by mouth daily, Disp: , Rfl: 0     tamsulosin (FLOMAX) 0.4 MG capsule,  Take 0.4 mg by mouth in the morning., Disp: , Rfl:   No Known Allergies    All Meds and Allergies reviewed in the record at the facility and is the most up-to-date    REVIEW OF SYSTEMS:   Review of Systems  Seven-point review of systems reviewed.  Pertinent positives in HPI.    PHYSICAL EXAMINATION:  Physical Exam     Vital signs: /62   Pulse 72   Temp 98.1  F (36.7  C)   Resp 16   Ht 1.829 m (6')   Wt 96.3 kg (212 lb 3.2 oz)   SpO2 95%   BMI 28.78 kg/m    General: Awake, Alert, oriented x1, sitting up in wheelchair, follows simple commands  HEENT: Pink conjunctiva, moist oral mucosa  NECK: Supple  CARDIOVASCULAR: S1, S2 without murmur or gallop.   RESPIRATORY: No wheezes, rales or rhonchi.   ABDOMEN: Soft, non distended with positive bowel sounds.   BACK: No kyphosis of the thoracic spine  EXTREMITIES: Moves both upper and lower extremities with generalized weakness.   NEUROLOGIC: Intact  PSYCHIATRIC: Cognitive impairment noted.      Labs:  All labs reviewed in the nursing home record and Epic   @  Lab Results   Component Value Date    WBC 7.7 04/05/2022     Lab Results   Component Value Date    RBC 3.97 04/05/2022     Lab Results   Component Value Date    HGB 8.5 04/06/2022     Lab Results   Component Value Date    HCT 30.6 04/05/2022     Lab Results   Component Value Date    MCV 77 04/05/2022     Lab Results   Component Value Date    MCH 21.9 04/05/2022     Lab Results   Component Value Date    MCHC 28.4 04/05/2022     Lab Results   Component Value Date    RDW 27.3 04/05/2022     Lab Results   Component Value Date     04/05/2022        @Last Comprehensive Metabolic Panel:  Sodium   Date Value Ref Range Status   05/14/2024 138 135 - 145 mmol/L Final     Comment:     Reference intervals for this test were updated on 09/26/2023 to more accurately reflect our healthy population. There may be differences in the flagging of prior results with similar values performed with this method. Interpretation  of those prior results can be made in the context of the updated reference intervals.      Potassium   Date Value Ref Range Status   05/14/2024 4.1 3.4 - 5.3 mmol/L Final   04/11/2022 3.9 3.5 - 5.0 mmol/L Final     Chloride   Date Value Ref Range Status   05/14/2024 102 98 - 107 mmol/L Final   04/11/2022 103 98 - 107 mmol/L Final     Carbon Dioxide (CO2)   Date Value Ref Range Status   05/14/2024 23 22 - 29 mmol/L Final   04/11/2022 23 22 - 31 mmol/L Final     Anion Gap   Date Value Ref Range Status   05/14/2024 13 7 - 15 mmol/L Final   04/11/2022 12 5 - 18 mmol/L Final     Glucose   Date Value Ref Range Status   05/14/2024 152 (H) 70 - 99 mg/dL Final   04/11/2022 103 70 - 125 mg/dL Final     Urea Nitrogen   Date Value Ref Range Status   05/14/2024 16.3 8.0 - 23.0 mg/dL Final   04/11/2022 13 8 - 28 mg/dL Final     Creatinine   Date Value Ref Range Status   05/14/2024 1.27 (H) 0.67 - 1.17 mg/dL Final     GFR Estimate   Date Value Ref Range Status   05/14/2024 55 (L) >60 mL/min/1.73m2 Final   03/17/2021 >60 >60 mL/min/1.73m2 Final     Calcium   Date Value Ref Range Status   05/14/2024 9.1 8.8 - 10.2 mg/dL Final       This note has been dictated using voice recognition software. Any grammatical or context distortions are unintentional and inherent to the software    Electronically signed by: Meseret Nolen, ABRAHAM       Sincerely,        Meseret Nolen, NP

## 2024-05-16 NOTE — PROGRESS NOTES
[facility] GERIATRIC SERVICES    Code Status:  DNR   Visit Type:   Chief Complaint   Patient presents with    prison Regulatory     Facility:  [facility] [place] () [[id number]]           History of Present Illness: Solitario Benjamin is a 86 year old male who I am seeing today for regulatory visit. Past medical history includes GERD with large hiatal hernia, gastritis, hypertension, acute on chronic systolic heart failure, diabetes mellitus type 2, prostate CA and weight loss.    Today patient sitting up in bedside chair.  Patient with underlying cognitive impairment and is a poor historian. DM2. He continues on Metformin. Pt noncompliant with diet. Blood sugars 150's to occasional 200s. No SOB or CP.       Assessment/plan:    Cognitive impairment  -Advanced.    Type 2 diabetes mellitus without complication, without long-term current use of insulin (H)  -Hemoglobin A1c 13.3.  -metformin 1000 mg in am and 500 mg in evening.   -Blood sugar checks twice daily.  -Consistent carb diet.        Active Ambulatory Problems     Diagnosis Date Noted    Lactic acid acidosis 01/21/2018    Accelerated hypertension 01/21/2018    Type 2 diabetes mellitus without complication, without long-term current use of insulin (H) 01/21/2018    Interstitial pneumonia (H) 01/22/2018    Essential hypertension 04/01/2022    COPD w Centrilobular emphysema  04/01/2022    Dyspnea on exertion 04/01/2022    Symptomatic anemia 04/01/2022    Ecchymosis of forearm 04/01/2022    NSTEMI -- demand ischemia 04/02/2022    Iron deficiency anemia due to chronic blood loss 04/04/2022    Acute blood loss anemia 04/04/2022    Thyroid nodule 04/07/2022    Obstructive sleep apnea syndrome 04/07/2022    Mixed hyperlipidemia 04/07/2022    Mild cognitive disorder 04/07/2022    History of malignant neoplasm of prostate 04/07/2022    History of fall 04/07/2022    Gastroesophageal reflux disease without esophagitis 04/07/2022    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     Gastrointestinal hemorrhage associated with peptic ulcer 04/19/2022    Gastroesophageal reflux disease with esophagitis, unspecified whether hemorrhage 04/19/2022    Urinary retention 04/19/2022    Weight loss 04/19/2022     Resolved Ambulatory Problems     Diagnosis Date Noted    PAVAN (acute kidney injury) (H24) 01/21/2018    Tachycardia 01/21/2018    Respiratory infection 01/21/2018    SOB (shortness of breath) 04/01/2022    Acute on chronic systolic heart failure (H) 04/02/2022    Prostate cancer (H) 04/04/2022    Moderate malnutrition (H24) 04/05/2022     No Additional Past Medical History       Current Outpatient Medications:     acetaminophen (TYLENOL) 325 MG tablet, Take 2 tablets (650 mg) by mouth every 4 hours as needed for mild pain or fever, Disp: , Rfl: 0    docusate sodium (COLACE) 100 MG capsule, Take 100 mg by mouth daily, Disp: , Rfl:     furosemide (LASIX) 20 MG tablet, Take 1 tablet (20 mg) by mouth daily, Disp: , Rfl: 0    losartan (COZAAR) 25 MG tablet, Take 1 tablet (25 mg) by mouth daily, Disp: , Rfl: 0    metFORMIN (GLUCOPHAGE) 500 MG tablet, Take 500 mg by mouth 2 times daily (with meals) Increased am dose to 1gm, Disp: , Rfl:     metoprolol succinate ER (TOPROL-XL) 25 MG 24 hr tablet, Take 1 tablet (25 mg) by mouth daily, Disp: , Rfl: 0    pantoprazole (PROTONIX) 40 MG EC tablet, Take 1 tablet (40 mg) by mouth 2 times daily (Patient taking differently: Take 20 mg by mouth daily), Disp: , Rfl: 0    rosuvastatin (CRESTOR) 5 MG tablet, Take 1 tablet (5 mg) by mouth every evening, Disp: , Rfl: 0    senna-docusate (SENOKOT-S/PERICOLACE) 8.6-50 MG tablet, Take 1 tablet by mouth 2 times daily, Disp: , Rfl: 0    spironolactone (ALDACTONE) 25 MG tablet, Take 1 tablet (25 mg) by mouth daily, Disp: , Rfl: 0    tamsulosin (FLOMAX) 0.4 MG capsule, Take 0.4 mg by mouth in the morning., Disp: , Rfl:   No Known Allergies    All Meds and Allergies reviewed in the record at the facility and is the most  up-to-date    REVIEW OF SYSTEMS:   Review of Systems  Seven-point review of systems reviewed.  Pertinent positives in HPI.    PHYSICAL EXAMINATION:  Physical Exam     Vital signs: /62   Pulse 72   Temp 98.1  F (36.7  C)   Resp 16   Ht 1.829 m (6')   Wt 96.3 kg (212 lb 3.2 oz)   SpO2 95%   BMI 28.78 kg/m    General: Awake, Alert, oriented x1, sitting up in wheelchair, follows simple commands  HEENT: Pink conjunctiva, moist oral mucosa  NECK: Supple  CARDIOVASCULAR: S1, S2 without murmur or gallop.   RESPIRATORY: No wheezes, rales or rhonchi.   ABDOMEN: Soft, non distended with positive bowel sounds.   BACK: No kyphosis of the thoracic spine  EXTREMITIES: Moves both upper and lower extremities with generalized weakness.   NEUROLOGIC: Intact  PSYCHIATRIC: Cognitive impairment noted.      Labs:  All labs reviewed in the nursing home record and Epic   @  Lab Results   Component Value Date    WBC 7.7 04/05/2022     Lab Results   Component Value Date    RBC 3.97 04/05/2022     Lab Results   Component Value Date    HGB 8.5 04/06/2022     Lab Results   Component Value Date    HCT 30.6 04/05/2022     Lab Results   Component Value Date    MCV 77 04/05/2022     Lab Results   Component Value Date    MCH 21.9 04/05/2022     Lab Results   Component Value Date    MCHC 28.4 04/05/2022     Lab Results   Component Value Date    RDW 27.3 04/05/2022     Lab Results   Component Value Date     04/05/2022        @Last Comprehensive Metabolic Panel:  Sodium   Date Value Ref Range Status   05/14/2024 138 135 - 145 mmol/L Final     Comment:     Reference intervals for this test were updated on 09/26/2023 to more accurately reflect our healthy population. There may be differences in the flagging of prior results with similar values performed with this method. Interpretation of those prior results can be made in the context of the updated reference intervals.      Potassium   Date Value Ref Range Status   05/14/2024 4.1 3.4  - 5.3 mmol/L Final   04/11/2022 3.9 3.5 - 5.0 mmol/L Final     Chloride   Date Value Ref Range Status   05/14/2024 102 98 - 107 mmol/L Final   04/11/2022 103 98 - 107 mmol/L Final     Carbon Dioxide (CO2)   Date Value Ref Range Status   05/14/2024 23 22 - 29 mmol/L Final   04/11/2022 23 22 - 31 mmol/L Final     Anion Gap   Date Value Ref Range Status   05/14/2024 13 7 - 15 mmol/L Final   04/11/2022 12 5 - 18 mmol/L Final     Glucose   Date Value Ref Range Status   05/14/2024 152 (H) 70 - 99 mg/dL Final   04/11/2022 103 70 - 125 mg/dL Final     Urea Nitrogen   Date Value Ref Range Status   05/14/2024 16.3 8.0 - 23.0 mg/dL Final   04/11/2022 13 8 - 28 mg/dL Final     Creatinine   Date Value Ref Range Status   05/14/2024 1.27 (H) 0.67 - 1.17 mg/dL Final     GFR Estimate   Date Value Ref Range Status   05/14/2024 55 (L) >60 mL/min/1.73m2 Final   03/17/2021 >60 >60 mL/min/1.73m2 Final     Calcium   Date Value Ref Range Status   05/14/2024 9.1 8.8 - 10.2 mg/dL Final       This note has been dictated using voice recognition software. Any grammatical or context distortions are unintentional and inherent to the software    Electronically signed by: Meseret Nolen, CNP

## 2024-06-11 ENCOUNTER — LAB REQUISITION (OUTPATIENT)
Dept: LAB | Facility: CLINIC | Age: 89
End: 2024-06-11
Payer: COMMERCIAL

## 2024-06-11 DIAGNOSIS — E11.9 TYPE 2 DIABETES MELLITUS WITHOUT COMPLICATIONS (H): ICD-10-CM

## 2024-06-12 LAB
ANION GAP SERPL CALCULATED.3IONS-SCNC: 11 MMOL/L (ref 7–15)
BUN SERPL-MCNC: 17.6 MG/DL (ref 8–23)
CALCIUM SERPL-MCNC: 9.2 MG/DL (ref 8.8–10.2)
CHLORIDE SERPL-SCNC: 102 MMOL/L (ref 98–107)
CREAT SERPL-MCNC: 1.19 MG/DL (ref 0.67–1.17)
DEPRECATED HCO3 PLAS-SCNC: 24 MMOL/L (ref 22–29)
EGFRCR SERPLBLD CKD-EPI 2021: 59 ML/MIN/1.73M2
GLUCOSE SERPL-MCNC: 181 MG/DL (ref 70–99)
POTASSIUM SERPL-SCNC: 4.1 MMOL/L (ref 3.4–5.3)
SODIUM SERPL-SCNC: 137 MMOL/L (ref 135–145)

## 2024-06-12 PROCEDURE — P9604 ONE-WAY ALLOW PRORATED TRIP: HCPCS | Mod: ORL | Performed by: FAMILY MEDICINE

## 2024-06-12 PROCEDURE — 80048 BASIC METABOLIC PNL TOTAL CA: CPT | Mod: ORL | Performed by: FAMILY MEDICINE

## 2024-06-12 PROCEDURE — 36415 COLL VENOUS BLD VENIPUNCTURE: CPT | Mod: ORL | Performed by: FAMILY MEDICINE

## 2024-07-30 VITALS
BODY MASS INDEX: 28.99 KG/M2 | SYSTOLIC BLOOD PRESSURE: 132 MMHG | RESPIRATION RATE: 16 BRPM | TEMPERATURE: 98 F | DIASTOLIC BLOOD PRESSURE: 86 MMHG | HEIGHT: 72 IN | OXYGEN SATURATION: 95 % | HEART RATE: 70 BPM | WEIGHT: 214 LBS

## 2024-07-30 NOTE — PROGRESS NOTES
[facility] GERIATRIC SERVICES       Patient Solitario Benjamin  MRN: [id number]        Reason for Visit     Chief Complaint   Patient presents with    [facility] Regulatory       Code Status     DNR only    Assessment     Cognitive impairment  COPD  Hypertension  Type 2 diabetes uncontrolled with r/c A1C 8.8  Chronic anemia due to blood loss  GERD  CHF  History of BPH/ h/o of prostate ca  HECTOR  Hyponatremia   CKD3a  Generalized weakness    Plan     Pt is a resident of long-term care  At baseline he is poorly ambulatory and wheelchair-bound  Also confused at baseline and remains a poor historian due to underlying history of cognitive impairment  Diabetes controlled with p.o. metformin with improvement in A1c from 13.3 to now 8.3  Recheck another A1c is scheduled  Not a candidate for very tight control  Staff reports no significant behaviors  CHF controlled with low-dose of Lasix and spironolactone  Hypertension controlled with losartan and metoprolol  Continue with his current care plan with no new concerns voiced    History     Patient is a very pleasant 89 year old male who is a resident of long-term care  At baseline he has cognitive impairment due to which limited recall of recent events.  Mood is pleasant but confused with limited recall  Staff reporting some ongoing behaviors relating to sexual inappropriate behaviors especially in public area  So far he has been redirectable with no recent behaviors reported  Has become weaker and chair bound but he claims that he self transferring in his room  Has COPD but denies any wheezing  Using only her as needed albuterol.  No concerns per staff  Diabetes control improved and he is on p.o. metformin.        Past Medical & Surgical History     PAST MEDICAL HISTORY:   Past Medical History:   Diagnosis Date    Accelerated hypertension 1/21/2018    Acute on chronic systolic heart failure (H) 4/2/2022    Centrilobular emphysema (H) 4/1/2022    Elevated troponin level not due to  acute coronary syndrome 4/2/2022    Type 2 diabetes mellitus without complication, without long-term current use of insulin (H) 1/21/2018      PAST SURGICAL HISTORY:   has a past surgical history that includes Esophagoscopy, gastroscopy, duodenoscopy (EGD), combined (N/A, 4/4/2022) and Colonoscopy (N/A, 4/4/2022).      Past Social History     Reviewed,  reports that he quit smoking about 41 years ago. His smoking use included cigarettes and cigarettes. He has quit using smokeless tobacco. He reports that he does not drink alcohol and does not use drugs.    Family History     Reviewed, and family history includes Myocardial Infarction in his brother, father, mother, and sister; Sleep Apnea in his child.    Medication List     Current Outpatient Medications   Medication Sig Dispense Refill    acetaminophen (TYLENOL) 325 MG tablet Take 2 tablets (650 mg) by mouth every 4 hours as needed for mild pain or fever  0    docusate sodium (COLACE) 100 MG capsule Take 100 mg by mouth daily      furosemide (LASIX) 20 MG tablet Take 1 tablet (20 mg) by mouth daily  0    losartan (COZAAR) 25 MG tablet Take 1 tablet (25 mg) by mouth daily  0    metFORMIN (GLUCOPHAGE) 500 MG tablet Take 500 mg by mouth 2 times daily (with meals) Increased am dose to 1gm      metoprolol succinate ER (TOPROL-XL) 25 MG 24 hr tablet Take 1 tablet (25 mg) by mouth daily  0    pantoprazole (PROTONIX) 40 MG EC tablet Take 1 tablet (40 mg) by mouth 2 times daily (Patient taking differently: Take 20 mg by mouth daily)  0    rosuvastatin (CRESTOR) 5 MG tablet Take 1 tablet (5 mg) by mouth every evening  0    senna-docusate (SENOKOT-S/PERICOLACE) 8.6-50 MG tablet Take 1 tablet by mouth 2 times daily  0    spironolactone (ALDACTONE) 25 MG tablet Take 1 tablet (25 mg) by mouth daily  0    tamsulosin (FLOMAX) 0.4 MG capsule Take 0.4 mg by mouth in the morning.       No current facility-administered medications for this visit.          Allergies     No Known  Allergies    Review of Systems   A comprehensive review of 14 systems not done patient has limited insight into his situation and has cognitive impairment      Physical Exam   /86   Pulse 70   Temp 98  F (36.7  C)   Resp 16   Ht 1.829 m (6')   Wt 97.1 kg (214 lb)   SpO2 95%   BMI 29.02 kg/m     GENERAL: no acute distress. Cooperative in conversation.   obese  HEENT: pupils are equal, round and reactive. Oral mucosa is moist and intact.  RESP:Chest symmetric. Regular respiratory rate. No stridor.  CVS S1S2  ABD: Nondistended, soft.  EXTREMITIES: No lower extremity edema.   NEURO: non focal. Alert and oriented x 2 WITH LIMITED RECALL.   PSYCH: within normal limits. No depression or anxiety.  SKIN: warm dry intact     Labs  Last Comprehensive Metabolic Panel:  Lab Results   Component Value Date     06/12/2024    POTASSIUM 4.1 06/12/2024    CHLORIDE 102 06/12/2024    CO2 24 06/12/2024    ANIONGAP 11 06/12/2024     (H) 06/12/2024    BUN 17.6 06/12/2024    CR 1.19 (H) 06/12/2024    GFRESTIMATED 59 (L) 06/12/2024    GUILLE 9.2 06/12/2024          Electronically signed by    Sailaja Bardales MD

## 2024-07-31 ENCOUNTER — NURSING HOME VISIT (OUTPATIENT)
Dept: GERIATRICS | Facility: CLINIC | Age: 89
End: 2024-07-31
Payer: COMMERCIAL

## 2024-07-31 DIAGNOSIS — R41.89 COGNITIVE IMPAIRMENT: Primary | ICD-10-CM

## 2024-07-31 DIAGNOSIS — I11.0 HYPERTENSIVE HEART DISEASE WITH HEART FAILURE (H): ICD-10-CM

## 2024-07-31 DIAGNOSIS — N18.31 CHRONIC KIDNEY DISEASE, STAGE 3A (H): ICD-10-CM

## 2024-07-31 DIAGNOSIS — E11.9 TYPE 2 DIABETES MELLITUS WITHOUT COMPLICATION, WITHOUT LONG-TERM CURRENT USE OF INSULIN (H): ICD-10-CM

## 2024-07-31 DIAGNOSIS — N40.0 BENIGN PROSTATIC HYPERPLASIA WITHOUT LOWER URINARY TRACT SYMPTOMS: ICD-10-CM

## 2024-07-31 DIAGNOSIS — I10 ESSENTIAL HYPERTENSION: ICD-10-CM

## 2024-07-31 PROCEDURE — 99309 SBSQ NF CARE MODERATE MDM 30: CPT | Performed by: FAMILY MEDICINE

## 2024-07-31 NOTE — LETTER
7/31/2024      Solitario Benjamin  [facility]  [address] [address]        [facility] GERIATRIC SERVICES       Patient Solitario Benjamin  MRN: [id number]        Reason for Visit     Chief Complaint   Patient presents with     [facility] Regulatory       Code Status     DNR only    Assessment     Cognitive impairment  COPD  Hypertension  Type 2 diabetes uncontrolled with r/c A1C 8.8  Chronic anemia due to blood loss  GERD  CHF  History of BPH/ h/o of prostate ca  HECTOR  Hyponatremia   CKD3a  Generalized weakness    Plan     Pt is a resident of long-term care  At baseline he is poorly ambulatory and wheelchair-bound  Also confused at baseline and remains a poor historian due to underlying history of cognitive impairment  Diabetes controlled with p.o. metformin with improvement in A1c from 13.3 to now 8.3  Recheck another A1c is scheduled  Not a candidate for very tight control  Staff reports no significant behaviors  CHF controlled with low-dose of Lasix and spironolactone  Hypertension controlled with losartan and metoprolol  Continue with his current care plan with no new concerns voiced    History     Patient is a very pleasant 89 year old male who is a resident of long-term care  At baseline he has cognitive impairment due to which limited recall of recent events.  Mood is pleasant but confused with limited recall  Staff reporting some ongoing behaviors relating to sexual inappropriate behaviors especially in public area  So far he has been redirectable with no recent behaviors reported  Has become weaker and chair bound but he claims that he self transferring in his room  Has COPD but denies any wheezing  Using only her as needed albuterol.  No concerns per staff  Diabetes control improved and he is on p.o. metformin.        Past Medical & Surgical History     PAST MEDICAL HISTORY:   Past Medical History:   Diagnosis Date     Accelerated hypertension 1/21/2018     Acute on chronic systolic  heart failure (H) 4/2/2022     Centrilobular emphysema (H) 4/1/2022     Elevated troponin level not due to acute coronary syndrome 4/2/2022     Type 2 diabetes mellitus without complication, without long-term current use of insulin (H) 1/21/2018      PAST SURGICAL HISTORY:   has a past surgical history that includes Esophagoscopy, gastroscopy, duodenoscopy (EGD), combined (N/A, 4/4/2022) and Colonoscopy (N/A, 4/4/2022).      Past Social History     Reviewed,  reports that he quit smoking about 41 years ago. His smoking use included cigarettes and cigarettes. He has quit using smokeless tobacco. He reports that he does not drink alcohol and does not use drugs.    Family History     Reviewed, and family history includes Myocardial Infarction in his brother, father, mother, and sister; Sleep Apnea in his child.    Medication List     Current Outpatient Medications   Medication Sig Dispense Refill     acetaminophen (TYLENOL) 325 MG tablet Take 2 tablets (650 mg) by mouth every 4 hours as needed for mild pain or fever  0     docusate sodium (COLACE) 100 MG capsule Take 100 mg by mouth daily       furosemide (LASIX) 20 MG tablet Take 1 tablet (20 mg) by mouth daily  0     losartan (COZAAR) 25 MG tablet Take 1 tablet (25 mg) by mouth daily  0     metFORMIN (GLUCOPHAGE) 500 MG tablet Take 500 mg by mouth 2 times daily (with meals) Increased am dose to 1gm       metoprolol succinate ER (TOPROL-XL) 25 MG 24 hr tablet Take 1 tablet (25 mg) by mouth daily  0     pantoprazole (PROTONIX) 40 MG EC tablet Take 1 tablet (40 mg) by mouth 2 times daily (Patient taking differently: Take 20 mg by mouth daily)  0     rosuvastatin (CRESTOR) 5 MG tablet Take 1 tablet (5 mg) by mouth every evening  0     senna-docusate (SENOKOT-S/PERICOLACE) 8.6-50 MG tablet Take 1 tablet by mouth 2 times daily  0     spironolactone (ALDACTONE) 25 MG tablet Take 1 tablet (25 mg) by mouth daily  0     tamsulosin (FLOMAX) 0.4 MG capsule Take 0.4 mg by mouth  in the morning.       No current facility-administered medications for this visit.          Allergies     No Known Allergies    Review of Systems   A comprehensive review of 14 systems not done patient has limited insight into his situation and has cognitive impairment      Physical Exam   /86   Pulse 70   Temp 98  F (36.7  C)   Resp 16   Ht 1.829 m (6')   Wt 97.1 kg (214 lb)   SpO2 95%   BMI 29.02 kg/m     GENERAL: no acute distress. Cooperative in conversation.   obese  HEENT: pupils are equal, round and reactive. Oral mucosa is moist and intact.  RESP:Chest symmetric. Regular respiratory rate. No stridor.  CVS S1S2  ABD: Nondistended, soft.  EXTREMITIES: No lower extremity edema.   NEURO: non focal. Alert and oriented x 2 WITH LIMITED RECALL.   PSYCH: within normal limits. No depression or anxiety.  SKIN: warm dry intact     Labs  Last Comprehensive Metabolic Panel:  Lab Results   Component Value Date     06/12/2024    POTASSIUM 4.1 06/12/2024    CHLORIDE 102 06/12/2024    CO2 24 06/12/2024    ANIONGAP 11 06/12/2024     (H) 06/12/2024    BUN 17.6 06/12/2024    CR 1.19 (H) 06/12/2024    GFRESTIMATED 59 (L) 06/12/2024    GUILLE 9.2 06/12/2024          Electronically signed by    Sailaja Bardales MD                           Sincerely,        LINWOOD Zendejas

## 2024-09-11 ENCOUNTER — NURSING HOME VISIT (OUTPATIENT)
Dept: GERIATRICS | Facility: CLINIC | Age: 89
End: 2024-09-11
Payer: COMMERCIAL

## 2024-09-11 VITALS
RESPIRATION RATE: 16 BRPM | BODY MASS INDEX: 29.36 KG/M2 | OXYGEN SATURATION: 95 % | SYSTOLIC BLOOD PRESSURE: 133 MMHG | HEART RATE: 70 BPM | WEIGHT: 216.8 LBS | TEMPERATURE: 98.2 F | HEIGHT: 72 IN | DIASTOLIC BLOOD PRESSURE: 69 MMHG

## 2024-09-11 DIAGNOSIS — I10 PRIMARY HYPERTENSION: ICD-10-CM

## 2024-09-11 DIAGNOSIS — N40.0 BENIGN PROSTATIC HYPERPLASIA WITHOUT LOWER URINARY TRACT SYMPTOMS: ICD-10-CM

## 2024-09-11 DIAGNOSIS — I10 ESSENTIAL HYPERTENSION: ICD-10-CM

## 2024-09-11 DIAGNOSIS — R41.89 COGNITIVE IMPAIRMENT: Primary | ICD-10-CM

## 2024-09-11 DIAGNOSIS — K21.00 GASTROESOPHAGEAL REFLUX DISEASE WITH ESOPHAGITIS, UNSPECIFIED WHETHER HEMORRHAGE: ICD-10-CM

## 2024-09-11 DIAGNOSIS — N18.31 CHRONIC KIDNEY DISEASE, STAGE 3A (H): ICD-10-CM

## 2024-09-11 DIAGNOSIS — J43.2 CENTRILOBULAR EMPHYSEMA (H): ICD-10-CM

## 2024-09-11 DIAGNOSIS — E11.9 TYPE 2 DIABETES MELLITUS WITHOUT COMPLICATION, WITHOUT LONG-TERM CURRENT USE OF INSULIN (H): ICD-10-CM

## 2024-09-11 PROCEDURE — 99309 SBSQ NF CARE MODERATE MDM 30: CPT | Performed by: NURSE PRACTITIONER

## 2024-09-11 NOTE — LETTER
9/11/2024      [facility]  [address]  [place] [address]        [facility]    Code Status:  DNR   Visit Type:   Chief Complaint   Patient presents with     senior living Regulatory     Facility:  [facility] ([facility]) [[id number]]           History of Present Illness: Solitario Benjamin is a 86 year old male who I am seeing today for regulatory visit. Past medical history includes GERD with large hiatal hernia, gastritis, hypertension, acute on chronic systolic heart failure, diabetes mellitus type 2, prostate CA and weight loss.    Today patient sitting up in wheelchair. Patient with underlying cognitive impairment and is a poor historian. HTN. Blood pressure satisfactory. DM2. Blood sugars occasionally in the 200s. Pt noncompliant with diabetic diet. He continues on Metformin. Pt denies any SOB or CP. No edema.       Assessment/plan:    Cognitive impairment  -Advanced.     Type 2 diabetes mellitus without complication, without long-term current use of insulin (H)  -A1c 8.3 improved from 13.   -Metformin 1000 mg BID.   -Consistent carb diet.   -Blood sugar checks BID.     Essential hypertension  -losartan 25 mg daily.   -lasix 20 mg daily.   -recent BMP reviewed.     Benign prostatic hyperplasia without lower urinary tract symptoms  -Continue Flomax.     Chronic kidney disease, stage 3a (H)  -Creatine 1.12.     Gastroesophageal reflux disease with esophagitis, unspecified whether hemorrhage  -Continue Protonix 20 mg daily.     Centrilobular emphysema (H)  -No evidence of acute exacerbation.         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      Dyspnea on exertion 04/01/2022     Symptomatic anemia 04/01/2022     Ecchymosis of forearm 04/01/2022     NSTEMI -- demand ischemia 04/02/2022     Iron deficiency anemia due to chronic blood loss 04/04/2022     Acute blood loss anemia 04/04/2022     Thyroid nodule 04/07/2022     Obstructive sleep apnea syndrome 04/07/2022     Mixed hyperlipidemia 04/07/2022     Mild cognitive disorder 04/07/2022     History of malignant neoplasm of prostate 04/07/2022     History of fall 04/07/2022     Gastroesophageal reflux disease without esophagitis 04/07/2022     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     Gastrointestinal hemorrhage associated with peptic ulcer 04/19/2022     Gastroesophageal reflux disease with esophagitis, unspecified whether hemorrhage 04/19/2022     Urinary retention 04/19/2022     Weight loss 04/19/2022     Hypertensive heart disease with heart failure (H) 07/31/2024     Chronic kidney disease, stage 3a (H) 07/31/2024     Resolved Ambulatory Problems     Diagnosis Date Noted     PAVAN (acute kidney injury) (H24) 01/21/2018     Tachycardia 01/21/2018     Respiratory infection 01/21/2018     SOB (shortness of breath) 04/01/2022     Acute on chronic systolic heart failure (H) 04/02/2022     Prostate cancer (H) 04/04/2022     Moderate malnutrition (H24) 04/05/2022     No Additional Past Medical History       Current Outpatient Medications:      acetaminophen (TYLENOL) 325 MG tablet, Take 2 tablets (650 mg) by mouth every 4 hours as needed for mild pain or fever, Disp: , Rfl: 0     docusate sodium (COLACE) 100 MG capsule, Take 100 mg by mouth daily, Disp: , Rfl:      furosemide (LASIX) 20 MG tablet, Take 1 tablet (20 mg) by mouth daily, Disp: , Rfl: 0     losartan (COZAAR) 25 MG tablet, Take 1 tablet (25 mg) by mouth daily, Disp: , Rfl: 0     metFORMIN (GLUCOPHAGE) 500 MG tablet, Take 500 mg by mouth 2 times daily (with meals) Increased am dose to 1gm, Disp: , Rfl:      metoprolol succinate ER (TOPROL-XL) 25 MG 24  hr tablet, Take 1 tablet (25 mg) by mouth daily, Disp: , Rfl: 0     pantoprazole (PROTONIX) 40 MG EC tablet, Take 1 tablet (40 mg) by mouth 2 times daily (Patient taking differently: Take 20 mg by mouth daily), Disp: , Rfl: 0     rosuvastatin (CRESTOR) 5 MG tablet, Take 1 tablet (5 mg) by mouth every evening, Disp: , Rfl: 0     senna-docusate (SENOKOT-S/PERICOLACE) 8.6-50 MG tablet, Take 1 tablet by mouth 2 times daily, Disp: , Rfl: 0     spironolactone (ALDACTONE) 25 MG tablet, Take 1 tablet (25 mg) by mouth daily, Disp: , Rfl: 0     tamsulosin (FLOMAX) 0.4 MG capsule, Take 0.4 mg by mouth in the morning., Disp: , Rfl:   No Known Allergies    All Meds and Allergies reviewed in the record at the facility and is the most up-to-date    REVIEW OF SYSTEMS:   Review of Systems  Seven-point review of systems reviewed.  Pertinent positives in HPI.    PHYSICAL EXAMINATION:  Physical Exam     Vital signs: /69   Pulse 70   Temp 98.2  F (36.8  C)   Resp 16   Ht 1.829 m (6')   Wt 98.3 kg (216 lb 12.8 oz)   SpO2 95%   BMI 29.40 kg/m    General: Awake, Alert, oriented x1, sitting up in wheelchair, follows simple commands  HEENT: Pink conjunctiva, moist oral mucosa  NECK: Supple  CARDIOVASCULAR: S1, S2 without murmur or gallop.   RESPIRATORY: No wheezes, rales or rhonchi.   ABDOMEN: Soft, non distended with positive bowel sounds.   BACK: No kyphosis of the thoracic spine  EXTREMITIES: Moves both upper and lower extremities with generalized weakness. No edema.   NEUROLOGIC: Intact  PSYCHIATRIC: Cognitive impairment noted.      Labs:  All labs reviewed in the nursing home record and [facility]   @  Lab Results   Component Value Date    WBC 7.7 04/05/2022     Lab Results   Component Value Date    RBC 3.97 04/05/2022     Lab Results   Component Value Date    HGB 8.5 04/06/2022     Lab Results   Component Value Date    HCT 30.6 04/05/2022     Lab Results   Component Value Date    MCV 77 04/05/2022     Lab Results   Component  Value Date    MCH 21.9 04/05/2022     Lab Results   Component Value Date    MCHC 28.4 04/05/2022     Lab Results   Component Value Date    RDW 27.3 04/05/2022     Lab Results   Component Value Date     04/05/2022        @Last Comprehensive Metabolic Panel:  Sodium   Date Value Ref Range Status   06/12/2024 137 135 - 145 mmol/L Final     Comment:     Reference intervals for this test were updated on 09/26/2023 to more accurately reflect our healthy population. There may be differences in the flagging of prior results with similar values performed with this method. Interpretation of those prior results can be made in the context of the updated reference intervals.      Potassium   Date Value Ref Range Status   06/12/2024 4.1 3.4 - 5.3 mmol/L Final   04/11/2022 3.9 3.5 - 5.0 mmol/L Final     Chloride   Date Value Ref Range Status   06/12/2024 102 98 - 107 mmol/L Final   04/11/2022 103 98 - 107 mmol/L Final     Carbon Dioxide (CO2)   Date Value Ref Range Status   06/12/2024 24 22 - 29 mmol/L Final   04/11/2022 23 22 - 31 mmol/L Final     Anion Gap   Date Value Ref Range Status   06/12/2024 11 7 - 15 mmol/L Final   04/11/2022 12 5 - 18 mmol/L Final     Glucose   Date Value Ref Range Status   06/12/2024 181 (H) 70 - 99 mg/dL Final   04/11/2022 103 70 - 125 mg/dL Final     Urea Nitrogen   Date Value Ref Range Status   06/12/2024 17.6 8.0 - 23.0 mg/dL Final   04/11/2022 13 8 - 28 mg/dL Final     Creatinine   Date Value Ref Range Status   06/12/2024 1.19 (H) 0.67 - 1.17 mg/dL Final     GFR Estimate   Date Value Ref Range Status   06/12/2024 59 (L) >60 mL/min/1.73m2 Final   03/17/2021 >60 >60 mL/min/1.73m2 Final     Calcium   Date Value Ref Range Status   06/12/2024 9.2 8.8 - 10.2 mg/dL Final       This note has been dictated using voice recognition software. Any grammatical or context distortions are unintentional and inherent to the software    Electronically signed by: Meseret Nolen CNP       Sincerely,        Meseret  Callum, NP

## 2024-09-12 NOTE — PROGRESS NOTES
[facility]    Code Status:  DNR   Visit Type:   Chief Complaint   Patient presents with    long term Regulatory     Facility:  [facility] () [[id number]]           History of Present Illness: Solitario Benjamin is a 86 year old male who I am seeing today for regulatory visit. Past medical history includes GERD with large hiatal hernia, gastritis, hypertension, acute on chronic systolic heart failure, diabetes mellitus type 2, prostate CA and weight loss.    Today patient sitting up in wheelchair. Patient with underlying cognitive impairment and is a poor historian. HTN. Blood pressure satisfactory. DM2. Blood sugars occasionally in the 200s. Pt noncompliant with diabetic diet. He continues on Metformin. Pt denies any SOB or CP. No edema.       Assessment/plan:    Cognitive impairment  -Advanced.     Type 2 diabetes mellitus without complication, without long-term current use of insulin (H)  -A1c 8.3 improved from 13.   -Metformin 1000 mg BID.   -Consistent carb diet.   -Blood sugar checks BID.     Essential hypertension  -losartan 25 mg daily.   -lasix 20 mg daily.   -recent BMP reviewed.     Benign prostatic hyperplasia without lower urinary tract symptoms  -Continue Flomax.     Chronic kidney disease, stage 3a (H)  -Creatine 1.12.     Gastroesophageal reflux disease with esophagitis, unspecified whether hemorrhage  -Continue Protonix 20 mg daily.     Centrilobular emphysema (H)  -No evidence of acute exacerbation.         Active Ambulatory Problems     Diagnosis Date Noted    Lactic acid acidosis 01/21/2018    Accelerated hypertension 01/21/2018    Type 2 diabetes mellitus without complication, without long-term current use of insulin (H) 01/21/2018    Interstitial pneumonia (H) 01/22/2018    Essential hypertension 04/01/2022    COPD w Centrilobular emphysema  04/01/2022    Dyspnea on exertion 04/01/2022    Symptomatic anemia 04/01/2022    Ecchymosis of forearm 04/01/2022    NSTEMI --  demand ischemia 04/02/2022    Iron deficiency anemia due to chronic blood loss 04/04/2022    Acute blood loss anemia 04/04/2022    Thyroid nodule 04/07/2022    Obstructive sleep apnea syndrome 04/07/2022    Mixed hyperlipidemia 04/07/2022    Mild cognitive disorder 04/07/2022    History of malignant neoplasm of prostate 04/07/2022    History of fall 04/07/2022    Gastroesophageal reflux disease without esophagitis 04/07/2022    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    Gastrointestinal hemorrhage associated with peptic ulcer 04/19/2022    Gastroesophageal reflux disease with esophagitis, unspecified whether hemorrhage 04/19/2022    Urinary retention 04/19/2022    Weight loss 04/19/2022    Hypertensive heart disease with heart failure (H) 07/31/2024    Chronic kidney disease, stage 3a (H) 07/31/2024     Resolved Ambulatory Problems     Diagnosis Date Noted    PAVAN (acute kidney injury) (H24) 01/21/2018    Tachycardia 01/21/2018    Respiratory infection 01/21/2018    SOB (shortness of breath) 04/01/2022    Acute on chronic systolic heart failure (H) 04/02/2022    Prostate cancer (H) 04/04/2022    Moderate malnutrition (H24) 04/05/2022     No Additional Past Medical History       Current Outpatient Medications:     acetaminophen (TYLENOL) 325 MG tablet, Take 2 tablets (650 mg) by mouth every 4 hours as needed for mild pain or fever, Disp: , Rfl: 0    docusate sodium (COLACE) 100 MG capsule, Take 100 mg by mouth daily, Disp: , Rfl:     furosemide (LASIX) 20 MG tablet, Take 1 tablet (20 mg) by mouth daily, Disp: , Rfl: 0    losartan (COZAAR) 25 MG tablet, Take 1 tablet (25 mg) by mouth daily, Disp: , Rfl: 0    metFORMIN (GLUCOPHAGE) 500 MG tablet, Take 500 mg by mouth 2 times daily (with meals) Increased am dose to 1gm, Disp: , Rfl:     metoprolol succinate ER (TOPROL-XL) 25 MG 24 hr tablet, Take 1 tablet (25 mg) by mouth daily, Disp: , Rfl: 0    pantoprazole (PROTONIX) 40 MG EC tablet, Take 1 tablet (40 mg) by mouth 2 times  daily (Patient taking differently: Take 20 mg by mouth daily), Disp: , Rfl: 0    rosuvastatin (CRESTOR) 5 MG tablet, Take 1 tablet (5 mg) by mouth every evening, Disp: , Rfl: 0    senna-docusate (SENOKOT-S/PERICOLACE) 8.6-50 MG tablet, Take 1 tablet by mouth 2 times daily, Disp: , Rfl: 0    spironolactone (ALDACTONE) 25 MG tablet, Take 1 tablet (25 mg) by mouth daily, Disp: , Rfl: 0    tamsulosin (FLOMAX) 0.4 MG capsule, Take 0.4 mg by mouth in the morning., Disp: , Rfl:   No Known Allergies    All Meds and Allergies reviewed in the record at the facility and is the most up-to-date    REVIEW OF SYSTEMS:   Review of Systems  Seven-point review of systems reviewed.  Pertinent positives in HPI.    PHYSICAL EXAMINATION:  Physical Exam     Vital signs: /69   Pulse 70   Temp 98.2  F (36.8  C)   Resp 16   Ht 1.829 m (6')   Wt 98.3 kg (216 lb 12.8 oz)   SpO2 95%   BMI 29.40 kg/m    General: Awake, Alert, oriented x1, sitting up in wheelchair, follows simple commands  HEENT: Pink conjunctiva, moist oral mucosa  NECK: Supple  CARDIOVASCULAR: S1, S2 without murmur or gallop.   RESPIRATORY: No wheezes, rales or rhonchi.   ABDOMEN: Soft, non distended with positive bowel sounds.   BACK: No kyphosis of the thoracic spine  EXTREMITIES: Moves both upper and lower extremities with generalized weakness. No edema.   NEUROLOGIC: Intact  PSYCHIATRIC: Cognitive impairment noted.      Labs:  All labs reviewed in the nursing home record and [facility]   @  Lab Results   Component Value Date    WBC 7.7 04/05/2022     Lab Results   Component Value Date    RBC 3.97 04/05/2022     Lab Results   Component Value Date    HGB 8.5 04/06/2022     Lab Results   Component Value Date    HCT 30.6 04/05/2022     Lab Results   Component Value Date    MCV 77 04/05/2022     Lab Results   Component Value Date    MCH 21.9 04/05/2022     Lab Results   Component Value Date    MCHC 28.4 04/05/2022     Lab Results   Component Value Date    RDW 27.3  04/05/2022     Lab Results   Component Value Date     04/05/2022        @Last Comprehensive Metabolic Panel:  Sodium   Date Value Ref Range Status   06/12/2024 137 135 - 145 mmol/L Final     Comment:     Reference intervals for this test were updated on 09/26/2023 to more accurately reflect our healthy population. There may be differences in the flagging of prior results with similar values performed with this method. Interpretation of those prior results can be made in the context of the updated reference intervals.      Potassium   Date Value Ref Range Status   06/12/2024 4.1 3.4 - 5.3 mmol/L Final   04/11/2022 3.9 3.5 - 5.0 mmol/L Final     Chloride   Date Value Ref Range Status   06/12/2024 102 98 - 107 mmol/L Final   04/11/2022 103 98 - 107 mmol/L Final     Carbon Dioxide (CO2)   Date Value Ref Range Status   06/12/2024 24 22 - 29 mmol/L Final   04/11/2022 23 22 - 31 mmol/L Final     Anion Gap   Date Value Ref Range Status   06/12/2024 11 7 - 15 mmol/L Final   04/11/2022 12 5 - 18 mmol/L Final     Glucose   Date Value Ref Range Status   06/12/2024 181 (H) 70 - 99 mg/dL Final   04/11/2022 103 70 - 125 mg/dL Final     Urea Nitrogen   Date Value Ref Range Status   06/12/2024 17.6 8.0 - 23.0 mg/dL Final   04/11/2022 13 8 - 28 mg/dL Final     Creatinine   Date Value Ref Range Status   06/12/2024 1.19 (H) 0.67 - 1.17 mg/dL Final     GFR Estimate   Date Value Ref Range Status   06/12/2024 59 (L) >60 mL/min/1.73m2 Final   03/17/2021 >60 >60 mL/min/1.73m2 Final     Calcium   Date Value Ref Range Status   06/12/2024 9.2 8.8 - 10.2 mg/dL Final       This note has been dictated using voice recognition software. Any grammatical or context distortions are unintentional and inherent to the software    Electronically signed by: Meseret Nolen CNP

## 2024-11-26 ENCOUNTER — NURSING HOME VISIT (OUTPATIENT)
Dept: GERIATRICS | Facility: CLINIC | Age: 89
End: 2024-11-26
Payer: COMMERCIAL

## 2024-11-26 VITALS
TEMPERATURE: 97.3 F | WEIGHT: 218 LBS | HEIGHT: 71 IN | RESPIRATION RATE: 18 BRPM | BODY MASS INDEX: 30.52 KG/M2 | DIASTOLIC BLOOD PRESSURE: 50 MMHG | OXYGEN SATURATION: 96 % | HEART RATE: 82 BPM | SYSTOLIC BLOOD PRESSURE: 124 MMHG

## 2024-11-26 DIAGNOSIS — N40.0 BENIGN PROSTATIC HYPERPLASIA WITHOUT LOWER URINARY TRACT SYMPTOMS: ICD-10-CM

## 2024-11-26 DIAGNOSIS — R41.89 COGNITIVE IMPAIRMENT: Primary | ICD-10-CM

## 2024-11-26 DIAGNOSIS — J43.2 CENTRILOBULAR EMPHYSEMA (H): ICD-10-CM

## 2024-11-26 DIAGNOSIS — N18.31 CHRONIC KIDNEY DISEASE, STAGE 3A (H): ICD-10-CM

## 2024-11-26 DIAGNOSIS — I10 ESSENTIAL HYPERTENSION: ICD-10-CM

## 2024-11-26 DIAGNOSIS — E11.9 TYPE 2 DIABETES MELLITUS WITHOUT COMPLICATION, WITHOUT LONG-TERM CURRENT USE OF INSULIN (H): ICD-10-CM

## 2024-11-26 DIAGNOSIS — Z72.89 INAPPROPRIATE SEXUAL BEHAVIOR: ICD-10-CM

## 2024-11-26 PROCEDURE — 99309 SBSQ NF CARE MODERATE MDM 30: CPT | Performed by: FAMILY MEDICINE

## 2024-11-26 NOTE — PROGRESS NOTES
[facility] GERIATRIC SERVICES       Patient Solitario Benjamin  MRN: [id number]        Reason for Visit     Chief Complaint   Patient presents with    care home Regulatory       Code Status     DNR only    Assessment     Cognitive impairment  COPD  Hypertension  Type 2 diabetes last A1C 8.8  Chronic anemia due to blood loss  GERD  CHF  History of BPH/ h/o of prostate ca  HECTOR  Hyponatremia   CKD3a  Generalized weakness    Plan     Pt is a resident of long-term care  At baseline he is poorly ambulatory and wheelchair-bound  He has cognitive impairment at baseline and remains a poor historian.  He has had some sexual inappropriate behaviors but has been redirectable recently with no new behaviors.  Mood is stable  Diabetes controlled with p.o. metformin with improvement noted.  Last A1c on check was 8.3  Recheck another A1c is scheduled  Not a candidate for very tight control  Staff reports no significant behaviors  CHF controlled with low-dose of Lasix and spironolactone  Hypertension controlled with losartan and metoprolol  Continue with his current care plan with no new concerns voiced.  Remains wheelchair-bound  Wt stable at 218lb    History     Patient is a very pleasant 89 year old male who is a resident of long-term care  At baseline he has cognitive impairment due to which limited recall of recent events.  Mood is pleasant but confused with limited recall  Staff reporting some ongoing behaviors relating to sexual inappropriate behaviors especially in public area  However he has been redirectable and recently no new behaviors have been reported  Has become weaker and chair bound but he claims that he self transferring in his room  Has COPD but denies any wheezing  Using only her as needed albuterol.  No concerns per staff  Diabetes control improved and he is on p.o. metformin.  Weights have been stable      Past Medical & Surgical History     PAST MEDICAL HISTORY:   Past Medical History:   Diagnosis Date     Accelerated hypertension 1/21/2018    Acute on chronic systolic heart failure (H) 4/2/2022    Centrilobular emphysema (H) 4/1/2022    Elevated troponin level not due to acute coronary syndrome 4/2/2022    Type 2 diabetes mellitus without complication, without long-term current use of insulin (H) 1/21/2018      PAST SURGICAL HISTORY:   has a past surgical history that includes Esophagoscopy, gastroscopy, duodenoscopy (EGD), combined (N/A, 4/4/2022) and Colonoscopy (N/A, 4/4/2022).      Past Social History     Reviewed,  reports that he quit smoking about 41 years ago. His smoking use included cigarettes and cigarettes. He has quit using smokeless tobacco. He reports that he does not drink alcohol and does not use drugs.    Family History     Reviewed, and family history includes Myocardial Infarction in his brother, father, mother, and sister; Sleep Apnea in his child.    Medication List     Current Outpatient Medications   Medication Sig Dispense Refill    acetaminophen (TYLENOL) 325 MG tablet Take 2 tablets (650 mg) by mouth every 4 hours as needed for mild pain or fever  0    docusate sodium (COLACE) 100 MG capsule Take 100 mg by mouth daily      furosemide (LASIX) 20 MG tablet Take 1 tablet (20 mg) by mouth daily  0    losartan (COZAAR) 25 MG tablet Take 1 tablet (25 mg) by mouth daily  0    metFORMIN (GLUCOPHAGE) 500 MG tablet Take 500 mg by mouth 2 times daily (with meals) Increased am dose to 1gm      metoprolol succinate ER (TOPROL-XL) 25 MG 24 hr tablet Take 1 tablet (25 mg) by mouth daily  0    pantoprazole (PROTONIX) 40 MG EC tablet Take 1 tablet (40 mg) by mouth 2 times daily (Patient taking differently: Take 20 mg by mouth daily)  0    rosuvastatin (CRESTOR) 5 MG tablet Take 1 tablet (5 mg) by mouth every evening  0    senna-docusate (SENOKOT-S/PERICOLACE) 8.6-50 MG tablet Take 1 tablet by mouth 2 times daily  0    spironolactone (ALDACTONE) 25 MG tablet Take 1 tablet (25 mg) by mouth daily  0     "tamsulosin (FLOMAX) 0.4 MG capsule Take 0.4 mg by mouth in the morning.       No current facility-administered medications for this visit.          Allergies     No Known Allergies    Review of Systems   A comprehensive review of 14 systems not done patient has limited insight into his situation and has cognitive impairment      Physical Exam   /50   Pulse 82   Temp 97.3  F (36.3  C)   Resp 18   Ht 1.803 m (5' 11\")   Wt 98.9 kg (218 lb)   SpO2 96%   BMI 30.40 kg/m     GENERAL: no acute distress. Cooperative in conversation.   obese  HEENT: pupils are equal, round and reactive. Oral mucosa is moist and intact.  RESP:Chest symmetric. Regular respiratory rate. No stridor.  CVS S1S2  ABD: Nondistended, soft.  EXTREMITIES: No lower extremity edema.   NEURO: non focal. Alert and oriented x 2 WITH LIMITED RECALL.   PSYCH: within normal limits. No depression or anxiety.  SKIN: warm dry intact     Labs  Last Comprehensive Metabolic Panel:  Lab Results   Component Value Date     06/12/2024    POTASSIUM 4.1 06/12/2024    CHLORIDE 102 06/12/2024    CO2 24 06/12/2024    ANIONGAP 11 06/12/2024     (H) 06/12/2024    BUN 17.6 06/12/2024    CR 1.19 (H) 06/12/2024    GFRESTIMATED 59 (L) 06/12/2024    GUILLE 9.2 06/12/2024          Electronically signed by    Sailaja Bardales MD                       "

## 2024-11-26 NOTE — LETTER
11/26/2024      Solitario Benjamin  [facility]  [address] [address]        [facility]       Patient Solitario Benjamin  MRN: [id number]        Reason for Visit     Chief Complaint   Patient presents with     senior care Regulatory       Code Status     DNR only    Assessment     Cognitive impairment  COPD  Hypertension  Type 2 diabetes last A1C 8.8  Chronic anemia due to blood loss  GERD  CHF  History of BPH/ h/o of prostate ca  HECTOR  Hyponatremia   CKD3a  Generalized weakness    Plan     Pt is a resident of long-term care  At baseline he is poorly ambulatory and wheelchair-bound  He has cognitive impairment at baseline and remains a poor historian.  He has had some sexual inappropriate behaviors but has been redirectable recently with no new behaviors.  Mood is stable  Diabetes controlled with p.o. metformin with improvement noted.  Last A1c on check was 8.3  Recheck another A1c is scheduled  Not a candidate for very tight control  Staff reports no significant behaviors  CHF controlled with low-dose of Lasix and spironolactone  Hypertension controlled with losartan and metoprolol  Continue with his current care plan with no new concerns voiced.  Remains wheelchair-bound  Wt stable at 218lb    History     Patient is a very pleasant 89 year old male who is a resident of long-term care  At baseline he has cognitive impairment due to which limited recall of recent events.  Mood is pleasant but confused with limited recall  Staff reporting some ongoing behaviors relating to sexual inappropriate behaviors especially in public area  However he has been redirectable and recently no new behaviors have been reported  Has become weaker and chair bound but he claims that he self transferring in his room  Has COPD but denies any wheezing  Using only her as needed albuterol.  No concerns per staff  Diabetes control improved and he is on p.o. metformin.  Weights have been  stable      Past Medical & Surgical History     PAST MEDICAL HISTORY:   Past Medical History:   Diagnosis Date     Accelerated hypertension 1/21/2018     Acute on chronic systolic heart failure (H) 4/2/2022     Centrilobular emphysema (H) 4/1/2022     Elevated troponin level not due to acute coronary syndrome 4/2/2022     Type 2 diabetes mellitus without complication, without long-term current use of insulin (H) 1/21/2018      PAST SURGICAL HISTORY:   has a past surgical history that includes Esophagoscopy, gastroscopy, duodenoscopy (EGD), combined (N/A, 4/4/2022) and Colonoscopy (N/A, 4/4/2022).      Past Social History     Reviewed,  reports that he quit smoking about 41 years ago. His smoking use included cigarettes and cigarettes. He has quit using smokeless tobacco. He reports that he does not drink alcohol and does not use drugs.    Family History     Reviewed, and family history includes Myocardial Infarction in his brother, father, mother, and sister; Sleep Apnea in his child.    Medication List     Current Outpatient Medications   Medication Sig Dispense Refill     acetaminophen (TYLENOL) 325 MG tablet Take 2 tablets (650 mg) by mouth every 4 hours as needed for mild pain or fever  0     docusate sodium (COLACE) 100 MG capsule Take 100 mg by mouth daily       furosemide (LASIX) 20 MG tablet Take 1 tablet (20 mg) by mouth daily  0     losartan (COZAAR) 25 MG tablet Take 1 tablet (25 mg) by mouth daily  0     metFORMIN (GLUCOPHAGE) 500 MG tablet Take 500 mg by mouth 2 times daily (with meals) Increased am dose to 1gm       metoprolol succinate ER (TOPROL-XL) 25 MG 24 hr tablet Take 1 tablet (25 mg) by mouth daily  0     pantoprazole (PROTONIX) 40 MG EC tablet Take 1 tablet (40 mg) by mouth 2 times daily (Patient taking differently: Take 20 mg by mouth daily)  0     rosuvastatin (CRESTOR) 5 MG tablet Take 1 tablet (5 mg) by mouth every evening  0     senna-docusate (SENOKOT-S/PERICOLACE) 8.6-50 MG tablet Take  "1 tablet by mouth 2 times daily  0     spironolactone (ALDACTONE) 25 MG tablet Take 1 tablet (25 mg) by mouth daily  0     tamsulosin (FLOMAX) 0.4 MG capsule Take 0.4 mg by mouth in the morning.       No current facility-administered medications for this visit.          Allergies     No Known Allergies    Review of Systems   A comprehensive review of 14 systems not done patient has limited insight into his situation and has cognitive impairment      Physical Exam   /50   Pulse 82   Temp 97.3  F (36.3  C)   Resp 18   Ht 1.803 m (5' 11\")   Wt 98.9 kg (218 lb)   SpO2 96%   BMI 30.40 kg/m     GENERAL: no acute distress. Cooperative in conversation.   obese  HEENT: pupils are equal, round and reactive. Oral mucosa is moist and intact.  RESP:Chest symmetric. Regular respiratory rate. No stridor.  CVS S1S2  ABD: Nondistended, soft.  EXTREMITIES: No lower extremity edema.   NEURO: non focal. Alert and oriented x 2 WITH LIMITED RECALL.   PSYCH: within normal limits. No depression or anxiety.  SKIN: warm dry intact     Labs  Last Comprehensive Metabolic Panel:  Lab Results   Component Value Date     06/12/2024    POTASSIUM 4.1 06/12/2024    CHLORIDE 102 06/12/2024    CO2 24 06/12/2024    ANIONGAP 11 06/12/2024     (H) 06/12/2024    BUN 17.6 06/12/2024    CR 1.19 (H) 06/12/2024    GFRESTIMATED 59 (L) 06/12/2024    GUILLE 9.2 06/12/2024          Electronically signed by    Sailaja Bardalse MD                           Sincerely,        LINWOOD Zendejas      "

## 2025-01-15 ENCOUNTER — NURSING HOME VISIT (OUTPATIENT)
Dept: GERIATRICS | Facility: CLINIC | Age: 89
End: 2025-01-15
Payer: COMMERCIAL

## 2025-01-15 ENCOUNTER — LAB REQUISITION (OUTPATIENT)
Dept: LAB | Facility: CLINIC | Age: 89
End: 2025-01-15
Payer: COMMERCIAL

## 2025-01-15 VITALS
OXYGEN SATURATION: 92 % | TEMPERATURE: 97.7 F | BODY MASS INDEX: 30.04 KG/M2 | DIASTOLIC BLOOD PRESSURE: 77 MMHG | RESPIRATION RATE: 16 BRPM | HEIGHT: 71 IN | WEIGHT: 214.6 LBS | HEART RATE: 78 BPM | SYSTOLIC BLOOD PRESSURE: 155 MMHG

## 2025-01-15 DIAGNOSIS — I50.22 CHRONIC SYSTOLIC (CONGESTIVE) HEART FAILURE (H): ICD-10-CM

## 2025-01-15 DIAGNOSIS — N40.0 BENIGN PROSTATIC HYPERPLASIA WITHOUT LOWER URINARY TRACT SYMPTOMS: ICD-10-CM

## 2025-01-15 DIAGNOSIS — R41.89 COGNITIVE IMPAIRMENT: Primary | ICD-10-CM

## 2025-01-15 DIAGNOSIS — K21.00 GASTROESOPHAGEAL REFLUX DISEASE WITH ESOPHAGITIS, UNSPECIFIED WHETHER HEMORRHAGE: ICD-10-CM

## 2025-01-15 DIAGNOSIS — J43.2 CENTRILOBULAR EMPHYSEMA (H): ICD-10-CM

## 2025-01-15 DIAGNOSIS — N18.31 CHRONIC KIDNEY DISEASE, STAGE 3A (H): ICD-10-CM

## 2025-01-15 DIAGNOSIS — I10 ESSENTIAL HYPERTENSION: ICD-10-CM

## 2025-01-15 DIAGNOSIS — E11.9 TYPE 2 DIABETES MELLITUS WITHOUT COMPLICATION, WITHOUT LONG-TERM CURRENT USE OF INSULIN (H): ICD-10-CM

## 2025-01-15 NOTE — LETTER
1/15/2025      [facility]  [address]  [place] [address]        [facility]    Code Status:  DNR   Visit Type:   Chief Complaint   Patient presents with     [facility] Regulatory     Facility:  [facility] ([facility]) [[id number]]           History of Present Illness: Solitario Benjamin is a 86 year old male who I am seeing today for regulatory visit. Past medical history includes GERD with large hiatal hernia, gastritis, hypertension, acute on chronic systolic heart failure, diabetes mellitus type 2, prostate CA and weight loss.    Today patient sitting up in wheelchair. Patient with underlying cognitive impairment and is a poor historian. Underlying DM2. Pt non complaint with diabetic diet. He continues on Metformin. Today blood sugars reviewed and appear improved. He denies any SOB or CP. No LE edeam.     Assessment/plan:    Type 2 diabetes mellitus without complication, without long-term current use of insulin (H)  -A1c 8.3 improved from 13.   -Metformin 1000 mg BID.   -Consistent carb diet.   -Blood sugar checks BID.   -Repeat HgbA1c.     Essential hypertension  -losartan 25 mg daily.   -lasix 20 mg daily.   -BP satisfactory.   -repeat BMP.     Benign prostatic hyperplasia without lower urinary tract symptoms  -Continue Flomax.     Chronic kidney disease, stage 3a (H)  -Creatine 1.12.   -Follow up Bmp.     Gastroesophageal reflux disease with esophagitis, unspecified whether hemorrhage  -Continue Protonix 20 mg daily.   -Follow up CBC.     Centrilobular emphysema (H)  -No evidence of acute exacerbation.     Advanced Dementia  -Occ resistive to cares.         Active Ambulatory Problems     Diagnosis Date Noted     Lactic acid acidosis 01/21/2018     Accelerated hypertension 01/21/2018     Type 2 diabetes mellitus without complication, without long-term current use of insulin (H) 01/21/2018     Interstitial pneumonia (H) 01/22/2018     Essential hypertension  04/01/2022     COPD w Centrilobular emphysema  04/01/2022     Dyspnea on exertion 04/01/2022     Symptomatic anemia 04/01/2022     Ecchymosis of forearm 04/01/2022     NSTEMI -- demand ischemia 04/02/2022     Iron deficiency anemia due to chronic blood loss 04/04/2022     Acute blood loss anemia 04/04/2022     Thyroid nodule 04/07/2022     Obstructive sleep apnea syndrome 04/07/2022     Mixed hyperlipidemia 04/07/2022     Mild cognitive disorder 04/07/2022     History of malignant neoplasm of prostate 04/07/2022     History of fall 04/07/2022     Gastroesophageal reflux disease without esophagitis 04/07/2022     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     Gastrointestinal hemorrhage associated with peptic ulcer 04/19/2022     Gastroesophageal reflux disease with esophagitis, unspecified whether hemorrhage 04/19/2022     Urinary retention 04/19/2022     Weight loss 04/19/2022     Hypertensive heart disease with heart failure (H) 07/31/2024     Chronic kidney disease, stage 3a (H) 07/31/2024     Resolved Ambulatory Problems     Diagnosis Date Noted     PAVAN (acute kidney injury) 01/21/2018     Tachycardia 01/21/2018     Respiratory infection 01/21/2018     SOB (shortness of breath) 04/01/2022     Acute on chronic systolic heart failure (H) 04/02/2022     Prostate cancer (H) 04/04/2022     Moderate malnutrition 04/05/2022     No Additional Past Medical History       Current Outpatient Medications:      acetaminophen (TYLENOL) 325 MG tablet, Take 2 tablets (650 mg) by mouth every 4 hours as needed for mild pain or fever, Disp: , Rfl: 0     docusate sodium (COLACE) 100 MG capsule, Take 100 mg by mouth daily, Disp: , Rfl:      furosemide (LASIX) 20 MG tablet, Take 1 tablet (20 mg) by mouth daily, Disp: , Rfl: 0     losartan (COZAAR) 25 MG tablet, Take 1 tablet (25 mg) by mouth daily, Disp: , Rfl: 0     metFORMIN (GLUCOPHAGE) 500 MG tablet, Take 500 mg by mouth 2 times daily (with meals) Increased am dose to 1gm, Disp: , Rfl:  "     metoprolol succinate ER (TOPROL-XL) 25 MG 24 hr tablet, Take 1 tablet (25 mg) by mouth daily, Disp: , Rfl: 0     pantoprazole (PROTONIX) 40 MG EC tablet, Take 1 tablet (40 mg) by mouth 2 times daily (Patient taking differently: Take 20 mg by mouth daily), Disp: , Rfl: 0     rosuvastatin (CRESTOR) 5 MG tablet, Take 1 tablet (5 mg) by mouth every evening, Disp: , Rfl: 0     senna-docusate (SENOKOT-S/PERICOLACE) 8.6-50 MG tablet, Take 1 tablet by mouth 2 times daily, Disp: , Rfl: 0     spironolactone (ALDACTONE) 25 MG tablet, Take 1 tablet (25 mg) by mouth daily, Disp: , Rfl: 0     tamsulosin (FLOMAX) 0.4 MG capsule, Take 0.4 mg by mouth in the morning., Disp: , Rfl:   No Known Allergies    All Meds and Allergies reviewed in the record at the facility and is the most up-to-date    REVIEW OF SYSTEMS:   Review of Systems  Seven-point review of systems reviewed.  Pertinent positives in HPI.    PHYSICAL EXAMINATION:  Physical Exam     Vital signs: BP (!) 155/77   Pulse 78   Temp 97.7  F (36.5  C)   Resp 16   Ht 1.803 m (5' 11\")   Wt 97.3 kg (214 lb 9.6 oz)   SpO2 92%   BMI 29.93 kg/m    General: Awake, Alert, oriented x1, sitting up in wheelchair, follows simple commands  HEENT: Pink conjunctiva, moist oral mucosa  NECK: Supple  CARDIOVASCULAR: S1, S2 without murmur or gallop.   RESPIRATORY: No wheezes, rales or rhonchi.   ABDOMEN: Soft, non distended with positive bowel sounds.   BACK: No kyphosis of the thoracic spine  EXTREMITIES: Moves both upper and lower extremities with generalized weakness. No edema.   NEUROLOGIC: Intact  PSYCHIATRIC: Cognitive impairment noted.      Labs:  All labs reviewed in the nursing home record and Epic   @  Lab Results   Component Value Date    WBC 7.7 04/05/2022     Lab Results   Component Value Date    RBC 3.97 04/05/2022     Lab Results   Component Value Date    HGB 8.5 04/06/2022     Lab Results   Component Value Date    HCT 30.6 04/05/2022     Lab Results   Component Value " Date    MCV 77 04/05/2022     Lab Results   Component Value Date    MCH 21.9 04/05/2022     Lab Results   Component Value Date    MCHC 28.4 04/05/2022     Lab Results   Component Value Date    RDW 27.3 04/05/2022     Lab Results   Component Value Date     04/05/2022        @Last Comprehensive Metabolic Panel:  Sodium   Date Value Ref Range Status   06/12/2024 137 135 - 145 mmol/L Final     Comment:     Reference intervals for this test were updated on 09/26/2023 to more accurately reflect our healthy population. There may be differences in the flagging of prior results with similar values performed with this method. Interpretation of those prior results can be made in the context of the updated reference intervals.      Potassium   Date Value Ref Range Status   06/12/2024 4.1 3.4 - 5.3 mmol/L Final   04/11/2022 3.9 3.5 - 5.0 mmol/L Final     Chloride   Date Value Ref Range Status   06/12/2024 102 98 - 107 mmol/L Final   04/11/2022 103 98 - 107 mmol/L Final     Carbon Dioxide (CO2)   Date Value Ref Range Status   06/12/2024 24 22 - 29 mmol/L Final   04/11/2022 23 22 - 31 mmol/L Final     Anion Gap   Date Value Ref Range Status   06/12/2024 11 7 - 15 mmol/L Final   04/11/2022 12 5 - 18 mmol/L Final     Glucose   Date Value Ref Range Status   06/12/2024 181 (H) 70 - 99 mg/dL Final   04/11/2022 103 70 - 125 mg/dL Final     Urea Nitrogen   Date Value Ref Range Status   06/12/2024 17.6 8.0 - 23.0 mg/dL Final   04/11/2022 13 8 - 28 mg/dL Final     Creatinine   Date Value Ref Range Status   06/12/2024 1.19 (H) 0.67 - 1.17 mg/dL Final     GFR Estimate   Date Value Ref Range Status   06/12/2024 59 (L) >60 mL/min/1.73m2 Final   03/17/2021 >60 >60 mL/min/1.73m2 Final     Calcium   Date Value Ref Range Status   06/12/2024 9.2 8.8 - 10.2 mg/dL Final       This note has been dictated using voice recognition software. Any grammatical or context distortions are unintentional and inherent to the software    Electronically  signed by: Meseret Nolen CNP       Sincerely,        Meseret Nolen NP    Electronically signed

## 2025-01-15 NOTE — PROGRESS NOTES
[facility] GERIATRIC SERVICES    Code Status:  DNR   Visit Type:   Chief Complaint   Patient presents with    [facility] Regulatory     Facility:  [facility] () [[id number]]           History of Present Illness: Solitario Benjamin is a 86 year old male who I am seeing today for regulatory visit. Past medical history includes GERD with large hiatal hernia, gastritis, hypertension, acute on chronic systolic heart failure, diabetes mellitus type 2, prostate CA and weight loss.    Today patient sitting up in wheelchair. Patient with underlying cognitive impairment and is a poor historian. Underlying DM2. Pt non complaint with diabetic diet. He continues on Metformin. Today blood sugars reviewed and appear improved. He denies any SOB or CP. No LE edeam.     Assessment/plan:    Type 2 diabetes mellitus without complication, without long-term current use of insulin (H)  -A1c 8.3 improved from 13.   -Metformin 1000 mg BID.   -Consistent carb diet.   -Blood sugar checks BID.   -Repeat HgbA1c.     Essential hypertension  -losartan 25 mg daily.   -lasix 20 mg daily.   -BP satisfactory.   -repeat BMP.     Benign prostatic hyperplasia without lower urinary tract symptoms  -Continue Flomax.     Chronic kidney disease, stage 3a (H)  -Creatine 1.12.   -Follow up Bmp.     Gastroesophageal reflux disease with esophagitis, unspecified whether hemorrhage  -Continue Protonix 20 mg daily.   -Follow up CBC.     Centrilobular emphysema (H)  -No evidence of acute exacerbation.     Advanced Dementia  -Occ resistive to cares.         Active Ambulatory Problems     Diagnosis Date Noted    Lactic acid acidosis 01/21/2018    Accelerated hypertension 01/21/2018    Type 2 diabetes mellitus without complication, without long-term current use of insulin (H) 01/21/2018    Interstitial pneumonia (H) 01/22/2018    Essential hypertension 04/01/2022    COPD w Centrilobular emphysema  04/01/2022    Dyspnea on exertion 04/01/2022    Symptomatic  anemia 04/01/2022    Ecchymosis of forearm 04/01/2022    NSTEMI -- demand ischemia 04/02/2022    Iron deficiency anemia due to chronic blood loss 04/04/2022    Acute blood loss anemia 04/04/2022    Thyroid nodule 04/07/2022    Obstructive sleep apnea syndrome 04/07/2022    Mixed hyperlipidemia 04/07/2022    Mild cognitive disorder 04/07/2022    History of malignant neoplasm of prostate 04/07/2022    History of fall 04/07/2022    Gastroesophageal reflux disease without esophagitis 04/07/2022    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    Gastrointestinal hemorrhage associated with peptic ulcer 04/19/2022    Gastroesophageal reflux disease with esophagitis, unspecified whether hemorrhage 04/19/2022    Urinary retention 04/19/2022    Weight loss 04/19/2022    Hypertensive heart disease with heart failure (H) 07/31/2024    Chronic kidney disease, stage 3a (H) 07/31/2024     Resolved Ambulatory Problems     Diagnosis Date Noted    PAVAN (acute kidney injury) 01/21/2018    Tachycardia 01/21/2018    Respiratory infection 01/21/2018    SOB (shortness of breath) 04/01/2022    Acute on chronic systolic heart failure (H) 04/02/2022    Prostate cancer (H) 04/04/2022    Moderate malnutrition 04/05/2022     No Additional Past Medical History       Current Outpatient Medications:     acetaminophen (TYLENOL) 325 MG tablet, Take 2 tablets (650 mg) by mouth every 4 hours as needed for mild pain or fever, Disp: , Rfl: 0    docusate sodium (COLACE) 100 MG capsule, Take 100 mg by mouth daily, Disp: , Rfl:     furosemide (LASIX) 20 MG tablet, Take 1 tablet (20 mg) by mouth daily, Disp: , Rfl: 0    losartan (COZAAR) 25 MG tablet, Take 1 tablet (25 mg) by mouth daily, Disp: , Rfl: 0    metFORMIN (GLUCOPHAGE) 500 MG tablet, Take 500 mg by mouth 2 times daily (with meals) Increased am dose to 1gm, Disp: , Rfl:     metoprolol succinate ER (TOPROL-XL) 25 MG 24 hr tablet, Take 1 tablet (25 mg) by mouth daily, Disp: , Rfl: 0    pantoprazole (PROTONIX)  "40 MG EC tablet, Take 1 tablet (40 mg) by mouth 2 times daily (Patient taking differently: Take 20 mg by mouth daily), Disp: , Rfl: 0    rosuvastatin (CRESTOR) 5 MG tablet, Take 1 tablet (5 mg) by mouth every evening, Disp: , Rfl: 0    senna-docusate (SENOKOT-S/PERICOLACE) 8.6-50 MG tablet, Take 1 tablet by mouth 2 times daily, Disp: , Rfl: 0    spironolactone (ALDACTONE) 25 MG tablet, Take 1 tablet (25 mg) by mouth daily, Disp: , Rfl: 0    tamsulosin (FLOMAX) 0.4 MG capsule, Take 0.4 mg by mouth in the morning., Disp: , Rfl:   No Known Allergies    All Meds and Allergies reviewed in the record at the facility and is the most up-to-date    REVIEW OF SYSTEMS:   Review of Systems  Seven-point review of systems reviewed.  Pertinent positives in HPI.    PHYSICAL EXAMINATION:  Physical Exam     Vital signs: BP (!) 155/77   Pulse 78   Temp 97.7  F (36.5  C)   Resp 16   Ht 1.803 m (5' 11\")   Wt 97.3 kg (214 lb 9.6 oz)   SpO2 92%   BMI 29.93 kg/m    General: Awake, Alert, oriented x1, sitting up in wheelchair, follows simple commands  HEENT: Pink conjunctiva, moist oral mucosa  NECK: Supple  CARDIOVASCULAR: S1, S2 without murmur or gallop.   RESPIRATORY: No wheezes, rales or rhonchi.   ABDOMEN: Soft, non distended with positive bowel sounds.   BACK: No kyphosis of the thoracic spine  EXTREMITIES: Moves both upper and lower extremities with generalized weakness. No edema.   NEUROLOGIC: Intact  PSYCHIATRIC: Cognitive impairment noted.      Labs:  All labs reviewed in the nursing home record and Epic   @  Lab Results   Component Value Date    WBC 7.7 04/05/2022     Lab Results   Component Value Date    RBC 3.97 04/05/2022     Lab Results   Component Value Date    HGB 8.5 04/06/2022     Lab Results   Component Value Date    HCT 30.6 04/05/2022     Lab Results   Component Value Date    MCV 77 04/05/2022     Lab Results   Component Value Date    MCH 21.9 04/05/2022     Lab Results   Component Value Date    MCHC 28.4 " 04/05/2022     Lab Results   Component Value Date    RDW 27.3 04/05/2022     Lab Results   Component Value Date     04/05/2022        @Last Comprehensive Metabolic Panel:  Sodium   Date Value Ref Range Status   06/12/2024 137 135 - 145 mmol/L Final     Comment:     Reference intervals for this test were updated on 09/26/2023 to more accurately reflect our healthy population. There may be differences in the flagging of prior results with similar values performed with this method. Interpretation of those prior results can be made in the context of the updated reference intervals.      Potassium   Date Value Ref Range Status   06/12/2024 4.1 3.4 - 5.3 mmol/L Final   04/11/2022 3.9 3.5 - 5.0 mmol/L Final     Chloride   Date Value Ref Range Status   06/12/2024 102 98 - 107 mmol/L Final   04/11/2022 103 98 - 107 mmol/L Final     Carbon Dioxide (CO2)   Date Value Ref Range Status   06/12/2024 24 22 - 29 mmol/L Final   04/11/2022 23 22 - 31 mmol/L Final     Anion Gap   Date Value Ref Range Status   06/12/2024 11 7 - 15 mmol/L Final   04/11/2022 12 5 - 18 mmol/L Final     Glucose   Date Value Ref Range Status   06/12/2024 181 (H) 70 - 99 mg/dL Final   04/11/2022 103 70 - 125 mg/dL Final     Urea Nitrogen   Date Value Ref Range Status   06/12/2024 17.6 8.0 - 23.0 mg/dL Final   04/11/2022 13 8 - 28 mg/dL Final     Creatinine   Date Value Ref Range Status   06/12/2024 1.19 (H) 0.67 - 1.17 mg/dL Final     GFR Estimate   Date Value Ref Range Status   06/12/2024 59 (L) >60 mL/min/1.73m2 Final   03/17/2021 >60 >60 mL/min/1.73m2 Final     Calcium   Date Value Ref Range Status   06/12/2024 9.2 8.8 - 10.2 mg/dL Final       This note has been dictated using voice recognition software. Any grammatical or context distortions are unintentional and inherent to the software    Electronically signed by: Meseret Nolen CNP

## 2025-01-16 LAB
ALBUMIN SERPL BCG-MCNC: 4.3 G/DL (ref 3.5–5.2)
ALP SERPL-CCNC: 98 U/L (ref 40–150)
ALT SERPL W P-5'-P-CCNC: 17 U/L (ref 0–70)
ANION GAP SERPL CALCULATED.3IONS-SCNC: 12 MMOL/L (ref 7–15)
AST SERPL W P-5'-P-CCNC: 26 U/L (ref 0–45)
BILIRUB DIRECT SERPL-MCNC: <0.2 MG/DL (ref 0–0.3)
BILIRUB SERPL-MCNC: 0.8 MG/DL
BUN SERPL-MCNC: 16.4 MG/DL (ref 8–23)
CALCIUM SERPL-MCNC: 9.6 MG/DL (ref 8.8–10.4)
CHLORIDE SERPL-SCNC: 101 MMOL/L (ref 98–107)
CHOLEST SERPL-MCNC: 121 MG/DL
CREAT SERPL-MCNC: 1.18 MG/DL (ref 0.67–1.17)
EGFRCR SERPLBLD CKD-EPI 2021: 60 ML/MIN/1.73M2
ERYTHROCYTE [DISTWIDTH] IN BLOOD BY AUTOMATED COUNT: 14.5 % (ref 10–15)
EST. AVERAGE GLUCOSE BLD GHB EST-MCNC: 169 MG/DL
FASTING STATUS PATIENT QL REPORTED: ABNORMAL
GLUCOSE SERPL-MCNC: 141 MG/DL (ref 70–99)
HBA1C MFR BLD: 7.5 %
HCO3 SERPL-SCNC: 25 MMOL/L (ref 22–29)
HCT VFR BLD AUTO: 41.5 % (ref 40–53)
HDLC SERPL-MCNC: 34 MG/DL
HGB BLD-MCNC: 13.6 G/DL (ref 13.3–17.7)
LDLC SERPL CALC-MCNC: 58 MG/DL
MCH RBC QN AUTO: 30 PG (ref 26.5–33)
MCHC RBC AUTO-ENTMCNC: 32.8 G/DL (ref 31.5–36.5)
MCV RBC AUTO: 91 FL (ref 78–100)
NONHDLC SERPL-MCNC: 87 MG/DL
PLATELET # BLD AUTO: 205 10E3/UL (ref 150–450)
POTASSIUM SERPL-SCNC: 4.6 MMOL/L (ref 3.4–5.3)
PROT SERPL-MCNC: 7.3 G/DL (ref 6.4–8.3)
RBC # BLD AUTO: 4.54 10E6/UL (ref 4.4–5.9)
SODIUM SERPL-SCNC: 138 MMOL/L (ref 135–145)
TRIGL SERPL-MCNC: 147 MG/DL
WBC # BLD AUTO: 7.8 10E3/UL (ref 4–11)

## 2025-01-16 PROCEDURE — 80061 LIPID PANEL: CPT | Mod: ORL | Performed by: NURSE PRACTITIONER

## 2025-01-16 PROCEDURE — P9604 ONE-WAY ALLOW PRORATED TRIP: HCPCS | Mod: ORL | Performed by: NURSE PRACTITIONER

## 2025-01-16 PROCEDURE — 80053 COMPREHEN METABOLIC PANEL: CPT | Mod: ORL | Performed by: NURSE PRACTITIONER

## 2025-01-16 PROCEDURE — 36415 COLL VENOUS BLD VENIPUNCTURE: CPT | Mod: ORL | Performed by: NURSE PRACTITIONER

## 2025-01-16 PROCEDURE — 83036 HEMOGLOBIN GLYCOSYLATED A1C: CPT | Mod: ORL | Performed by: NURSE PRACTITIONER

## 2025-01-16 PROCEDURE — 85027 COMPLETE CBC AUTOMATED: CPT | Mod: ORL | Performed by: NURSE PRACTITIONER

## 2025-01-16 PROCEDURE — 82248 BILIRUBIN DIRECT: CPT | Mod: ORL | Performed by: NURSE PRACTITIONER

## 2025-03-24 VITALS
BODY MASS INDEX: 29.96 KG/M2 | TEMPERATURE: 98 F | OXYGEN SATURATION: 96 % | HEART RATE: 64 BPM | RESPIRATION RATE: 20 BRPM | DIASTOLIC BLOOD PRESSURE: 69 MMHG | SYSTOLIC BLOOD PRESSURE: 120 MMHG | WEIGHT: 214 LBS | HEIGHT: 71 IN

## 2025-03-24 NOTE — PROGRESS NOTES
[facility] GERIATRIC SERVICES       Patient Solitario Benjamin  MRN: [id number]        Reason for Visit     Chief Complaint   Patient presents with    long-term Regulatory       Code Status     DNR only    Assessment     Cognitive impairment  COPD  Hypertension  Type 2 diabetes last A1C 8.8  Chronic anemia due to blood loss  GERD  CHF  History of BPH/ h/o of prostate ca  HECTOR  Hyponatremia   CKD3a  Generalized weakness    Plan     Pt is a resident of long-term care  At baseline he is poorly ambulatory and wheelchair-bound  With no recent fall or change reported.  He has cognitive impairment at baseline and remains a poor historian\.  Mood is stable.  He has had some sexual inappropriate behaviors but has been redirectable recently with no new behaviors.  Not on any psych meds.  Diabetes controlled with p.o. metformin with improvement noted.  Last A1c on check was 7.5 improved from 8.3  Not a candidate for very tight control  CHF controlled with low-dose of Lasix and spironolactone  No evidence of lymphedema noted today  Hypertension controlled with losartan and metoprolol  Continue with his current care plan with no new concerns voiced.  Remains wheelchair-bound  Wt stable at 214 lb with no change  R/c labs stable    History     Patient is a very pleasant 89 year old male who is a resident of long-term care  At baseline he has cognitive impairment due to which limited recall of recent events.  Mood is pleasant but confused with limited recall  Staff reporting some ongoing behaviors relating to sexual inappropriate behaviors especially in public area  However he has been redirectable.  No recent behaviors reported by staff no concerns he is not on any psych meds    Has become weaker and chair bound but he claims that he self transferring in his room  Noted to be sitting in a wheelchair  Has COPD but denies any wheezing  Using only her as needed albuterol.  No concerns per staff  Diabetes control improved and he is on  p.o. metformin.  Weights have been stable      Past Medical & Surgical History     PAST MEDICAL HISTORY:   Past Medical History:   Diagnosis Date    Accelerated hypertension 1/21/2018    Acute on chronic systolic heart failure (H) 4/2/2022    Centrilobular emphysema (H) 4/1/2022    Elevated troponin level not due to acute coronary syndrome 4/2/2022    Type 2 diabetes mellitus without complication, without long-term current use of insulin (H) 1/21/2018      PAST SURGICAL HISTORY:   has a past surgical history that includes Esophagoscopy, gastroscopy, duodenoscopy (EGD), combined (N/A, 4/4/2022) and Colonoscopy (N/A, 4/4/2022).      Past Social History     Reviewed,  reports that he quit smoking about 42 years ago. His smoking use included cigarettes and cigarettes. He has quit using smokeless tobacco. He reports that he does not drink alcohol and does not use drugs.    Family History     Reviewed, and family history includes Myocardial Infarction in his brother, father, mother, and sister; Sleep Apnea in his child.    Medication List     Current Outpatient Medications   Medication Sig Dispense Refill    acetaminophen (TYLENOL) 325 MG tablet Take 2 tablets (650 mg) by mouth every 4 hours as needed for mild pain or fever  0    docusate sodium (COLACE) 100 MG capsule Take 100 mg by mouth daily      furosemide (LASIX) 20 MG tablet Take 1 tablet (20 mg) by mouth daily  0    losartan (COZAAR) 25 MG tablet Take 1 tablet (25 mg) by mouth daily  0    metFORMIN (GLUCOPHAGE) 500 MG tablet Take 500 mg by mouth 2 times daily (with meals) Increased am dose to 1gm      metoprolol succinate ER (TOPROL-XL) 25 MG 24 hr tablet Take 1 tablet (25 mg) by mouth daily  0    pantoprazole (PROTONIX) 40 MG EC tablet Take 1 tablet (40 mg) by mouth 2 times daily (Patient taking differently: Take 20 mg by mouth daily)  0    rosuvastatin (CRESTOR) 5 MG tablet Take 1 tablet (5 mg) by mouth every evening  0    senna-docusate (SENOKOT-S/PERICOLACE)  "8.6-50 MG tablet Take 1 tablet by mouth 2 times daily  0    spironolactone (ALDACTONE) 25 MG tablet Take 1 tablet (25 mg) by mouth daily  0    tamsulosin (FLOMAX) 0.4 MG capsule Take 0.4 mg by mouth in the morning.       No current facility-administered medications for this visit.          Allergies     No Known Allergies    Review of Systems   A comprehensive review of 14 systems not done patient has limited insight into his situation and has cognitive impairment      Physical Exam   /69   Pulse 64   Temp 98  F (36.7  C)   Resp 20   Ht 1.803 m (5' 11\")   Wt 97.1 kg (214 lb)   SpO2 96%   BMI 29.85 kg/m     GENERAL: no acute distress. Cooperative in conversation.   obese  HEENT: pupils are equal, round and reactive. Oral mucosa is moist and intact.  RESP:Chest symmetric. Regular respiratory rate. No stridor.  CVS S1S2  ABD: Nondistended, soft.  EXTREMITIES: No lower extremity edema.   NEURO: non focal. Alert and oriented x 2 WITH LIMITED RECALL.   PSYCH: within normal limits. No depression or anxiety.  SKIN: warm dry intact     Labs  Last Comprehensive Metabolic Panel:  Lab Results   Component Value Date     01/16/2025    POTASSIUM 4.6 01/16/2025    CHLORIDE 101 01/16/2025    CO2 25 01/16/2025    ANIONGAP 12 01/16/2025     (H) 01/16/2025    BUN 16.4 01/16/2025    CR 1.18 (H) 01/16/2025    GFRESTIMATED 60 (L) 01/16/2025    GUILLE 9.6 01/16/2025          Electronically signed by    Sailaja Bardales MD                       "

## 2025-03-25 ENCOUNTER — NURSING HOME VISIT (OUTPATIENT)
Dept: GERIATRICS | Facility: CLINIC | Age: 89
End: 2025-03-25
Payer: COMMERCIAL

## 2025-03-25 DIAGNOSIS — N40.0 BENIGN PROSTATIC HYPERPLASIA WITHOUT LOWER URINARY TRACT SYMPTOMS: ICD-10-CM

## 2025-03-25 DIAGNOSIS — I50.32 CHRONIC DIASTOLIC HEART FAILURE (H): ICD-10-CM

## 2025-03-25 DIAGNOSIS — G47.33 OBSTRUCTIVE SLEEP APNEA SYNDROME: ICD-10-CM

## 2025-03-25 DIAGNOSIS — K21.00 GASTROESOPHAGEAL REFLUX DISEASE WITH ESOPHAGITIS, UNSPECIFIED WHETHER HEMORRHAGE: ICD-10-CM

## 2025-03-25 DIAGNOSIS — R41.89 COGNITIVE IMPAIRMENT: Primary | ICD-10-CM

## 2025-03-25 DIAGNOSIS — I10 ESSENTIAL HYPERTENSION: ICD-10-CM

## 2025-03-25 DIAGNOSIS — J43.2 CENTRILOBULAR EMPHYSEMA (H): ICD-10-CM

## 2025-03-25 DIAGNOSIS — E11.9 TYPE 2 DIABETES MELLITUS WITHOUT COMPLICATION, WITHOUT LONG-TERM CURRENT USE OF INSULIN (H): ICD-10-CM

## 2025-03-25 DIAGNOSIS — N18.31 CHRONIC KIDNEY DISEASE, STAGE 3A (H): ICD-10-CM

## 2025-03-25 DIAGNOSIS — I10 PRIMARY HYPERTENSION: ICD-10-CM

## 2025-03-25 DIAGNOSIS — Z72.89 INAPPROPRIATE SEXUAL BEHAVIOR: ICD-10-CM

## 2025-03-25 PROCEDURE — 99309 SBSQ NF CARE MODERATE MDM 30: CPT | Performed by: FAMILY MEDICINE

## 2025-03-25 NOTE — LETTER
3/25/2025      Solitario Benjamin  [facility]  [address] [address]        [facility] GERIATRIC SERVICES       Patient Solitario Benjamin  MRN: [id number]        Reason for Visit     Chief Complaint   Patient presents with     intermediate Regulatory       Code Status     DNR only    Assessment     Cognitive impairment  COPD  Hypertension  Type 2 diabetes last A1C 8.8  Chronic anemia due to blood loss  GERD  CHF  History of BPH/ h/o of prostate ca  HECTOR  Hyponatremia   CKD3a  Generalized weakness    Plan     Pt is a resident of long-term care  At baseline he is poorly ambulatory and wheelchair-bound  With no recent fall or change reported.  He has cognitive impairment at baseline and remains a poor historian\.  Mood is stable.  He has had some sexual inappropriate behaviors but has been redirectable recently with no new behaviors.  Not on any psych meds.  Diabetes controlled with p.o. metformin with improvement noted.  Last A1c on check was 7.5 improved from 8.3  Not a candidate for very tight control  CHF controlled with low-dose of Lasix and spironolactone  No evidence of lymphedema noted today  Hypertension controlled with losartan and metoprolol  Continue with his current care plan with no new concerns voiced.  Remains wheelchair-bound  Wt stable at 214 lb with no change  R/c labs stable    History     Patient is a very pleasant 89 year old male who is a resident of long-term care  At baseline he has cognitive impairment due to which limited recall of recent events.  Mood is pleasant but confused with limited recall  Staff reporting some ongoing behaviors relating to sexual inappropriate behaviors especially in public area  However he has been redirectable.  No recent behaviors reported by staff no concerns he is not on any psych meds    Has become weaker and chair bound but he claims that he self transferring in his room  Noted to be sitting in a wheelchair  Has COPD but denies any  wheezing  Using only her as needed albuterol.  No concerns per staff  Diabetes control improved and he is on p.o. metformin.  Weights have been stable      Past Medical & Surgical History     PAST MEDICAL HISTORY:   Past Medical History:   Diagnosis Date     Accelerated hypertension 1/21/2018     Acute on chronic systolic heart failure (H) 4/2/2022     Centrilobular emphysema (H) 4/1/2022     Elevated troponin level not due to acute coronary syndrome 4/2/2022     Type 2 diabetes mellitus without complication, without long-term current use of insulin (H) 1/21/2018      PAST SURGICAL HISTORY:   has a past surgical history that includes Esophagoscopy, gastroscopy, duodenoscopy (EGD), combined (N/A, 4/4/2022) and Colonoscopy (N/A, 4/4/2022).      Past Social History     Reviewed,  reports that he quit smoking about 42 years ago. His smoking use included cigarettes and cigarettes. He has quit using smokeless tobacco. He reports that he does not drink alcohol and does not use drugs.    Family History     Reviewed, and family history includes Myocardial Infarction in his brother, father, mother, and sister; Sleep Apnea in his child.    Medication List     Current Outpatient Medications   Medication Sig Dispense Refill     acetaminophen (TYLENOL) 325 MG tablet Take 2 tablets (650 mg) by mouth every 4 hours as needed for mild pain or fever  0     docusate sodium (COLACE) 100 MG capsule Take 100 mg by mouth daily       furosemide (LASIX) 20 MG tablet Take 1 tablet (20 mg) by mouth daily  0     losartan (COZAAR) 25 MG tablet Take 1 tablet (25 mg) by mouth daily  0     metFORMIN (GLUCOPHAGE) 500 MG tablet Take 500 mg by mouth 2 times daily (with meals) Increased am dose to 1gm       metoprolol succinate ER (TOPROL-XL) 25 MG 24 hr tablet Take 1 tablet (25 mg) by mouth daily  0     pantoprazole (PROTONIX) 40 MG EC tablet Take 1 tablet (40 mg) by mouth 2 times daily (Patient taking differently: Take 20 mg by mouth daily)  0      "rosuvastatin (CRESTOR) 5 MG tablet Take 1 tablet (5 mg) by mouth every evening  0     senna-docusate (SENOKOT-S/PERICOLACE) 8.6-50 MG tablet Take 1 tablet by mouth 2 times daily  0     spironolactone (ALDACTONE) 25 MG tablet Take 1 tablet (25 mg) by mouth daily  0     tamsulosin (FLOMAX) 0.4 MG capsule Take 0.4 mg by mouth in the morning.       No current facility-administered medications for this visit.          Allergies     No Known Allergies    Review of Systems   A comprehensive review of 14 systems not done patient has limited insight into his situation and has cognitive impairment      Physical Exam   /69   Pulse 64   Temp 98  F (36.7  C)   Resp 20   Ht 1.803 m (5' 11\")   Wt 97.1 kg (214 lb)   SpO2 96%   BMI 29.85 kg/m     GENERAL: no acute distress. Cooperative in conversation.   obese  HEENT: pupils are equal, round and reactive. Oral mucosa is moist and intact.  RESP:Chest symmetric. Regular respiratory rate. No stridor.  CVS S1S2  ABD: Nondistended, soft.  EXTREMITIES: No lower extremity edema.   NEURO: non focal. Alert and oriented x 2 WITH LIMITED RECALL.   PSYCH: within normal limits. No depression or anxiety.  SKIN: warm dry intact     Labs  Last Comprehensive Metabolic Panel:  Lab Results   Component Value Date     01/16/2025    POTASSIUM 4.6 01/16/2025    CHLORIDE 101 01/16/2025    CO2 25 01/16/2025    ANIONGAP 12 01/16/2025     (H) 01/16/2025    BUN 16.4 01/16/2025    CR 1.18 (H) 01/16/2025    GFRESTIMATED 60 (L) 01/16/2025    GUILLE 9.6 01/16/2025          Electronically signed by    Sailaja Bardales MD                           Sincerely,        LINWOOD Zendejas    Electronically signed  "

## 2025-05-07 ENCOUNTER — NURSING HOME VISIT (OUTPATIENT)
Dept: GERIATRICS | Facility: CLINIC | Age: 89
End: 2025-05-07
Payer: COMMERCIAL

## 2025-05-07 ENCOUNTER — LAB REQUISITION (OUTPATIENT)
Dept: LAB | Facility: CLINIC | Age: 89
End: 2025-05-07
Payer: COMMERCIAL

## 2025-05-07 VITALS
DIASTOLIC BLOOD PRESSURE: 67 MMHG | SYSTOLIC BLOOD PRESSURE: 121 MMHG | HEIGHT: 71 IN | OXYGEN SATURATION: 91 % | WEIGHT: 208.4 LBS | TEMPERATURE: 98.2 F | RESPIRATION RATE: 20 BRPM | HEART RATE: 87 BPM | BODY MASS INDEX: 29.18 KG/M2

## 2025-05-07 DIAGNOSIS — E11.9 TYPE 2 DIABETES MELLITUS WITHOUT COMPLICATION, WITHOUT LONG-TERM CURRENT USE OF INSULIN (H): ICD-10-CM

## 2025-05-07 DIAGNOSIS — I10 ESSENTIAL HYPERTENSION: ICD-10-CM

## 2025-05-07 DIAGNOSIS — J06.9 UPPER RESPIRATORY TRACT INFECTION, UNSPECIFIED TYPE: Primary | ICD-10-CM

## 2025-05-07 DIAGNOSIS — R41.89 COGNITIVE IMPAIRMENT: ICD-10-CM

## 2025-05-07 DIAGNOSIS — J43.2 CENTRILOBULAR EMPHYSEMA (H): ICD-10-CM

## 2025-05-07 DIAGNOSIS — I50.32 CHRONIC DIASTOLIC HEART FAILURE (H): ICD-10-CM

## 2025-05-07 DIAGNOSIS — I10 PRIMARY HYPERTENSION: ICD-10-CM

## 2025-05-07 DIAGNOSIS — I50.22 CHRONIC SYSTOLIC (CONGESTIVE) HEART FAILURE (H): ICD-10-CM

## 2025-05-07 DIAGNOSIS — N40.0 BENIGN PROSTATIC HYPERPLASIA WITHOUT LOWER URINARY TRACT SYMPTOMS: ICD-10-CM

## 2025-05-07 DIAGNOSIS — K21.00 GASTROESOPHAGEAL REFLUX DISEASE WITH ESOPHAGITIS, UNSPECIFIED WHETHER HEMORRHAGE: ICD-10-CM

## 2025-05-07 DIAGNOSIS — N18.31 CHRONIC KIDNEY DISEASE, STAGE 3A (H): ICD-10-CM

## 2025-05-07 RX ORDER — LORATADINE 10 MG/1
10 TABLET, ORALLY DISINTEGRATING ORAL DAILY
COMMUNITY

## 2025-05-07 NOTE — LETTER
5/7/2025      Solitario Benjamin  [facility]  [address]  [place] [address]        [facility]    Code Status:  DNR   Visit Type:   Chief Complaint   Patient presents with     Nursing Home Acute     Facility:  [facility] () [[id number]]           History of Present Illness: Solitario Benjamin is a 86 year old male who I am seeing today for acute care visit. Past medical history includes GERD with large hiatal hernia, gastritis, hypertension, acute on chronic systolic heart failure, diabetes mellitus type 2, prostate CA and weight loss.    Today patient sitting up in wheelchair. Patient with underlying cognitive impairment and is a poor historian. Nursing staff reporting pt very tired like the last 2 days. He is a febrile. Underlying emphysema. He reports runny nose and sore throat. Throat slightly red with thrush to his tongue. He was swabbed for RSV and COVID which was negative. He denies any seasonal allergies however has been spending time out on the patio. That is where I found him today. He reports occasional cough. Expiratory wheezing noted. He is eating good. DM2 on Metformin. BP satisfactory. Per the records pt has not had a BM in 4 days. He is abdomen is slightly distended. Bowel sounds sluggish. Nursing did give him Senna S earlier this am. Pt denies pain in his belly.     Assessment/plan:    URI symptoms  Hx of Centrilobular emphysema (H)  -COVID, RSV negative.   -Obtain CXR 2 view today.   -Give Claritin 10 mg every day X 7 days.   -Follow up CBC and BMP in am.   -Nystatin swish and spit QID for 5 days for thrush.     Constipation  -No BM in 4 days.   -Senna S given this am. Also give ducolax supp this evening.   -ABD xray 2 view. Today.       Type 2 diabetes mellitus without complication, without long-term current use of insulin (H)  -A1c 8.3 improved from 13.   -Metformin 1000 mg BID.   -Consistent carb diet.   -Blood sugar checks BID. Monitor appetite closely  with above symptoms.   -Repeat HgbA1c.     Essential hypertension  -losartan 25 mg daily.   -lasix 20 mg daily.   -BP satisfactory.   -repeat BMP.     Benign prostatic hyperplasia without lower urinary tract symptoms  -Continue Flomax.     Chronic kidney disease, stage 3a (H)  -Creatine 1.12.   -Follow up Bmp.     Gastroesophageal reflux disease with esophagitis, unspecified whether hemorrhage  -Continue Protonix 20 mg daily.     Advanced Dementia  -Occ resistive to cares.         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     Dyspnea on exertion 04/01/2022     Symptomatic anemia 04/01/2022     Ecchymosis of forearm 04/01/2022     NSTEMI -- demand ischemia 04/02/2022     Iron deficiency anemia due to chronic blood loss 04/04/2022     Acute blood loss anemia 04/04/2022     Thyroid nodule 04/07/2022     Obstructive sleep apnea syndrome 04/07/2022     Mixed hyperlipidemia 04/07/2022     Mild cognitive disorder 04/07/2022     History of malignant neoplasm of prostate 04/07/2022     History of fall 04/07/2022     Gastroesophageal reflux disease without esophagitis 04/07/2022     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     Gastrointestinal hemorrhage associated with peptic ulcer 04/19/2022     Gastroesophageal reflux disease with esophagitis, unspecified whether hemorrhage 04/19/2022     Urinary retention 04/19/2022     Weight loss 04/19/2022     Hypertensive heart disease with heart failure (H) 07/31/2024     Chronic kidney disease, stage 3a (H) 07/31/2024     Resolved Ambulatory Problems     Diagnosis Date Noted     PAVAN (acute kidney injury) 01/21/2018     Tachycardia 01/21/2018     Respiratory infection  "01/21/2018     SOB (shortness of breath) 04/01/2022     Acute on chronic systolic heart failure (H) 04/02/2022     Prostate cancer (H) 04/04/2022     Moderate malnutrition 04/05/2022     No Additional Past Medical History       Current Outpatient Medications:      loratadine (CLARITIN REDITABS) 10 MG ODT, Take 10 mg by mouth daily., Disp: , Rfl:      acetaminophen (TYLENOL) 325 MG tablet, Take 2 tablets (650 mg) by mouth every 4 hours as needed for mild pain or fever, Disp: , Rfl: 0     docusate sodium (COLACE) 100 MG capsule, Take 100 mg by mouth daily, Disp: , Rfl:      furosemide (LASIX) 20 MG tablet, Take 1 tablet (20 mg) by mouth daily, Disp: , Rfl: 0     losartan (COZAAR) 25 MG tablet, Take 1 tablet (25 mg) by mouth daily, Disp: , Rfl: 0     metFORMIN (GLUCOPHAGE) 500 MG tablet, Take 500 mg by mouth 2 times daily (with meals) Increased am dose to 1gm, Disp: , Rfl:      metoprolol succinate ER (TOPROL-XL) 25 MG 24 hr tablet, Take 1 tablet (25 mg) by mouth daily, Disp: , Rfl: 0     pantoprazole (PROTONIX) 40 MG EC tablet, Take 1 tablet (40 mg) by mouth 2 times daily (Patient taking differently: Take 20 mg by mouth daily), Disp: , Rfl: 0     rosuvastatin (CRESTOR) 5 MG tablet, Take 1 tablet (5 mg) by mouth every evening, Disp: , Rfl: 0     senna-docusate (SENOKOT-S/PERICOLACE) 8.6-50 MG tablet, Take 1 tablet by mouth 2 times daily, Disp: , Rfl: 0     spironolactone (ALDACTONE) 25 MG tablet, Take 1 tablet (25 mg) by mouth daily, Disp: , Rfl: 0     tamsulosin (FLOMAX) 0.4 MG capsule, Take 0.4 mg by mouth in the morning., Disp: , Rfl:   No Known Allergies    All Meds and Allergies reviewed in the record at the facility and is the most up-to-date    REVIEW OF SYSTEMS:   Review of Systems  Seven-point review of systems reviewed.  Pertinent positives in HPI.    PHYSICAL EXAMINATION:  Physical Exam     Vital signs: /67   Pulse 87   Temp 98.2  F (36.8  C)   Resp 20   Ht 1.803 m (5' 11\")   Wt 94.5 kg (208 lb " 6.4 oz)   SpO2 (!) 91%   BMI 29.07 kg/m    General: Awake, Alert, oriented x1, sitting up in wheelchair, follows simple commands  HEENT: Pink conjunctiva, moist oral mucosa, runny nose on exam, throat with redness.   NECK: Supple  CARDIOVASCULAR: S1, S2 without murmur or gallop.   RESPIRATORY: Occasional expiratory wheezing noted. Occ wet cough.   ABDOMEN: Soft, non distended with positive bowel sounds.   BACK: No kyphosis of the thoracic spine  EXTREMITIES: Moves both upper and lower extremities with generalized weakness. No edema.   NEUROLOGIC: Intact  PSYCHIATRIC: Cognitive impairment noted.      Labs:  All labs reviewed in the nursing home record and Epic   @  Lab Results   Component Value Date    WBC 7.7 04/05/2022     Lab Results   Component Value Date    RBC 3.97 04/05/2022     Lab Results   Component Value Date    HGB 8.5 04/06/2022     Lab Results   Component Value Date    HCT 30.6 04/05/2022     Lab Results   Component Value Date    MCV 77 04/05/2022     Lab Results   Component Value Date    MCH 21.9 04/05/2022     Lab Results   Component Value Date    MCHC 28.4 04/05/2022     Lab Results   Component Value Date    RDW 27.3 04/05/2022     Lab Results   Component Value Date     04/05/2022        @Last Comprehensive Metabolic Panel:  Sodium   Date Value Ref Range Status   01/16/2025 138 135 - 145 mmol/L Final     Potassium   Date Value Ref Range Status   01/16/2025 4.6 3.4 - 5.3 mmol/L Final   04/11/2022 3.9 3.5 - 5.0 mmol/L Final     Chloride   Date Value Ref Range Status   01/16/2025 101 98 - 107 mmol/L Final   04/11/2022 103 98 - 107 mmol/L Final     Carbon Dioxide (CO2)   Date Value Ref Range Status   01/16/2025 25 22 - 29 mmol/L Final   04/11/2022 23 22 - 31 mmol/L Final     Anion Gap   Date Value Ref Range Status   01/16/2025 12 7 - 15 mmol/L Final   04/11/2022 12 5 - 18 mmol/L Final     Glucose   Date Value Ref Range Status   01/16/2025 141 (H) 70 - 99 mg/dL Final   04/11/2022 103 70 - 125  mg/dL Final     Urea Nitrogen   Date Value Ref Range Status   01/16/2025 16.4 8.0 - 23.0 mg/dL Final   04/11/2022 13 8 - 28 mg/dL Final     Creatinine   Date Value Ref Range Status   01/16/2025 1.18 (H) 0.67 - 1.17 mg/dL Final     GFR Estimate   Date Value Ref Range Status   01/16/2025 60 (L) >60 mL/min/1.73m2 Final   03/17/2021 >60 >60 mL/min/1.73m2 Final     Calcium   Date Value Ref Range Status   01/16/2025 9.6 8.8 - 10.4 mg/dL Final     Comment:     Reference intervals for this test were updated on 7/16/2024 to reflect our healthy population more accurately. There may be differences in the flagging of prior results with similar values performed with this method. Those prior results can be interpreted in the context of the updated reference intervals.       This note has been dictated using voice recognition software. Any grammatical or context distortions are unintentional and inherent to the software    Electronically signed by: Meseret Nolen CNP       Sincerely,        Meseret Nolen NP    Electronically signed

## 2025-05-08 LAB
ANION GAP SERPL CALCULATED.3IONS-SCNC: 13 MMOL/L (ref 7–15)
BUN SERPL-MCNC: 15.3 MG/DL (ref 8–23)
CALCIUM SERPL-MCNC: 9.4 MG/DL (ref 8.8–10.4)
CHLORIDE SERPL-SCNC: 101 MMOL/L (ref 98–107)
CREAT SERPL-MCNC: 1.19 MG/DL (ref 0.67–1.17)
EGFRCR SERPLBLD CKD-EPI 2021: 59 ML/MIN/1.73M2
ERYTHROCYTE [DISTWIDTH] IN BLOOD BY AUTOMATED COUNT: 14.3 % (ref 10–15)
GLUCOSE SERPL-MCNC: 131 MG/DL (ref 70–99)
HCO3 SERPL-SCNC: 24 MMOL/L (ref 22–29)
HCT VFR BLD AUTO: 40.3 % (ref 40–53)
HGB BLD-MCNC: 13.4 G/DL (ref 13.3–17.7)
MCH RBC QN AUTO: 29.8 PG (ref 26.5–33)
MCHC RBC AUTO-ENTMCNC: 33.3 G/DL (ref 31.5–36.5)
MCV RBC AUTO: 90 FL (ref 78–100)
PLATELET # BLD AUTO: 157 10E3/UL (ref 150–450)
POTASSIUM SERPL-SCNC: 3.8 MMOL/L (ref 3.4–5.3)
RBC # BLD AUTO: 4.49 10E6/UL (ref 4.4–5.9)
SODIUM SERPL-SCNC: 138 MMOL/L (ref 135–145)
WBC # BLD AUTO: 8.5 10E3/UL (ref 4–11)

## 2025-05-08 PROCEDURE — 36415 COLL VENOUS BLD VENIPUNCTURE: CPT | Mod: ORL | Performed by: NURSE PRACTITIONER

## 2025-05-08 PROCEDURE — P9604 ONE-WAY ALLOW PRORATED TRIP: HCPCS | Mod: ORL | Performed by: NURSE PRACTITIONER

## 2025-05-08 PROCEDURE — 80048 BASIC METABOLIC PNL TOTAL CA: CPT | Mod: ORL | Performed by: NURSE PRACTITIONER

## 2025-05-08 PROCEDURE — 85027 COMPLETE CBC AUTOMATED: CPT | Mod: ORL | Performed by: NURSE PRACTITIONER

## 2025-05-08 NOTE — PROGRESS NOTES
[facility]    Code Status:  DNR   Visit Type:   Chief Complaint   Patient presents with    Nursing Home Acute     Facility:  [facility] () [[id number]]           History of Present Illness: Solitario Benjamin is a 86 year old male who I am seeing today for acute care visit. Past medical history includes GERD with large hiatal hernia, gastritis, hypertension, acute on chronic systolic heart failure, diabetes mellitus type 2, prostate CA and weight loss.    Today patient sitting up in wheelchair. Patient with underlying cognitive impairment and is a poor historian. Nursing staff reporting pt very tired like the last 2 days. He is a febrile. Underlying emphysema. He reports runny nose and sore throat. Throat slightly red with thrush to his tongue. He was swabbed for RSV and COVID which was negative. He denies any seasonal allergies however has been spending time out on the patio. That is where I found him today. He reports occasional cough. Expiratory wheezing noted. He is eating good. DM2 on Metformin. BP satisfactory. Per the records pt has not had a BM in 4 days. He is abdomen is slightly distended. Bowel sounds sluggish. Nursing did give him Senna S earlier this am. Pt denies pain in his belly.     Assessment/plan:    URI symptoms  Hx of Centrilobular emphysema (H)  -COVID, RSV negative.   -Obtain CXR 2 view today.   -Give Claritin 10 mg every day X 7 days.   -Follow up CBC and BMP in am.   -Nystatin swish and spit QID for 5 days for thrush.     Constipation  -No BM in 4 days.   -Senna S given this am. Also give ducolax supp this evening.   -ABD xray 2 view. Today.       Type 2 diabetes mellitus without complication, without long-term current use of insulin (H)  -A1c 8.3 improved from 13.   -Metformin 1000 mg BID.   -Consistent carb diet.   -Blood sugar checks BID. Monitor appetite closely with above symptoms.   -Repeat HgbA1c.     Essential hypertension  -losartan 25 mg daily.   -lasix 20  mg daily.   -BP satisfactory.   -repeat BMP.     Benign prostatic hyperplasia without lower urinary tract symptoms  -Continue Flomax.     Chronic kidney disease, stage 3a (H)  -Creatine 1.12.   -Follow up Bmp.     Gastroesophageal reflux disease with esophagitis, unspecified whether hemorrhage  -Continue Protonix 20 mg daily.     Advanced Dementia  -Occ resistive to cares.         Active Ambulatory Problems     Diagnosis Date Noted    Lactic acid acidosis 01/21/2018    Accelerated hypertension 01/21/2018    Type 2 diabetes mellitus without complication, without long-term current use of insulin (H) 01/21/2018    Interstitial pneumonia (H) 01/22/2018    Essential hypertension 04/01/2022    COPD w Centrilobular emphysema  04/01/2022    Dyspnea on exertion 04/01/2022    Symptomatic anemia 04/01/2022    Ecchymosis of forearm 04/01/2022    NSTEMI -- demand ischemia 04/02/2022    Iron deficiency anemia due to chronic blood loss 04/04/2022    Acute blood loss anemia 04/04/2022    Thyroid nodule 04/07/2022    Obstructive sleep apnea syndrome 04/07/2022    Mixed hyperlipidemia 04/07/2022    Mild cognitive disorder 04/07/2022    History of malignant neoplasm of prostate 04/07/2022    History of fall 04/07/2022    Gastroesophageal reflux disease without esophagitis 04/07/2022    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    Gastrointestinal hemorrhage associated with peptic ulcer 04/19/2022    Gastroesophageal reflux disease with esophagitis, unspecified whether hemorrhage 04/19/2022    Urinary retention 04/19/2022    Weight loss 04/19/2022    Hypertensive heart disease with heart failure (H) 07/31/2024    Chronic kidney disease, stage 3a (H) 07/31/2024     Resolved Ambulatory Problems     Diagnosis Date Noted    PAVAN (acute kidney injury) 01/21/2018    Tachycardia 01/21/2018    Respiratory infection 01/21/2018    SOB (shortness of breath) 04/01/2022    Acute on chronic systolic heart failure (H) 04/02/2022    Prostate cancer (H)  "04/04/2022    Moderate malnutrition 04/05/2022     No Additional Past Medical History       Current Outpatient Medications:     loratadine (CLARITIN REDITABS) 10 MG ODT, Take 10 mg by mouth daily., Disp: , Rfl:     acetaminophen (TYLENOL) 325 MG tablet, Take 2 tablets (650 mg) by mouth every 4 hours as needed for mild pain or fever, Disp: , Rfl: 0    docusate sodium (COLACE) 100 MG capsule, Take 100 mg by mouth daily, Disp: , Rfl:     furosemide (LASIX) 20 MG tablet, Take 1 tablet (20 mg) by mouth daily, Disp: , Rfl: 0    losartan (COZAAR) 25 MG tablet, Take 1 tablet (25 mg) by mouth daily, Disp: , Rfl: 0    metFORMIN (GLUCOPHAGE) 500 MG tablet, Take 500 mg by mouth 2 times daily (with meals) Increased am dose to 1gm, Disp: , Rfl:     metoprolol succinate ER (TOPROL-XL) 25 MG 24 hr tablet, Take 1 tablet (25 mg) by mouth daily, Disp: , Rfl: 0    pantoprazole (PROTONIX) 40 MG EC tablet, Take 1 tablet (40 mg) by mouth 2 times daily (Patient taking differently: Take 20 mg by mouth daily), Disp: , Rfl: 0    rosuvastatin (CRESTOR) 5 MG tablet, Take 1 tablet (5 mg) by mouth every evening, Disp: , Rfl: 0    senna-docusate (SENOKOT-S/PERICOLACE) 8.6-50 MG tablet, Take 1 tablet by mouth 2 times daily, Disp: , Rfl: 0    spironolactone (ALDACTONE) 25 MG tablet, Take 1 tablet (25 mg) by mouth daily, Disp: , Rfl: 0    tamsulosin (FLOMAX) 0.4 MG capsule, Take 0.4 mg by mouth in the morning., Disp: , Rfl:   No Known Allergies    All Meds and Allergies reviewed in the record at the facility and is the most up-to-date    REVIEW OF SYSTEMS:   Review of Systems  Seven-point review of systems reviewed.  Pertinent positives in HPI.    PHYSICAL EXAMINATION:  Physical Exam     Vital signs: /67   Pulse 87   Temp 98.2  F (36.8  C)   Resp 20   Ht 1.803 m (5' 11\")   Wt 94.5 kg (208 lb 6.4 oz)   SpO2 (!) 91%   BMI 29.07 kg/m    General: Awake, Alert, oriented x1, sitting up in wheelchair, follows simple commands  HEENT: Pink " conjunctiva, moist oral mucosa, runny nose on exam, throat with redness.   NECK: Supple  CARDIOVASCULAR: S1, S2 without murmur or gallop.   RESPIRATORY: Occasional expiratory wheezing noted. Occ wet cough.   ABDOMEN: Soft, non distended with positive bowel sounds.   BACK: No kyphosis of the thoracic spine  EXTREMITIES: Moves both upper and lower extremities with generalized weakness. No edema.   NEUROLOGIC: Intact  PSYCHIATRIC: Cognitive impairment noted.      Labs:  All labs reviewed in the nursing home record and Epic   @  Lab Results   Component Value Date    WBC 7.7 04/05/2022     Lab Results   Component Value Date    RBC 3.97 04/05/2022     Lab Results   Component Value Date    HGB 8.5 04/06/2022     Lab Results   Component Value Date    HCT 30.6 04/05/2022     Lab Results   Component Value Date    MCV 77 04/05/2022     Lab Results   Component Value Date    MCH 21.9 04/05/2022     Lab Results   Component Value Date    MCHC 28.4 04/05/2022     Lab Results   Component Value Date    RDW 27.3 04/05/2022     Lab Results   Component Value Date     04/05/2022        @Last Comprehensive Metabolic Panel:  Sodium   Date Value Ref Range Status   01/16/2025 138 135 - 145 mmol/L Final     Potassium   Date Value Ref Range Status   01/16/2025 4.6 3.4 - 5.3 mmol/L Final   04/11/2022 3.9 3.5 - 5.0 mmol/L Final     Chloride   Date Value Ref Range Status   01/16/2025 101 98 - 107 mmol/L Final   04/11/2022 103 98 - 107 mmol/L Final     Carbon Dioxide (CO2)   Date Value Ref Range Status   01/16/2025 25 22 - 29 mmol/L Final   04/11/2022 23 22 - 31 mmol/L Final     Anion Gap   Date Value Ref Range Status   01/16/2025 12 7 - 15 mmol/L Final   04/11/2022 12 5 - 18 mmol/L Final     Glucose   Date Value Ref Range Status   01/16/2025 141 (H) 70 - 99 mg/dL Final   04/11/2022 103 70 - 125 mg/dL Final     Urea Nitrogen   Date Value Ref Range Status   01/16/2025 16.4 8.0 - 23.0 mg/dL Final   04/11/2022 13 8 - 28 mg/dL Final      Creatinine   Date Value Ref Range Status   01/16/2025 1.18 (H) 0.67 - 1.17 mg/dL Final     GFR Estimate   Date Value Ref Range Status   01/16/2025 60 (L) >60 mL/min/1.73m2 Final   03/17/2021 >60 >60 mL/min/1.73m2 Final     Calcium   Date Value Ref Range Status   01/16/2025 9.6 8.8 - 10.4 mg/dL Final     Comment:     Reference intervals for this test were updated on 7/16/2024 to reflect our healthy population more accurately. There may be differences in the flagging of prior results with similar values performed with this method. Those prior results can be interpreted in the context of the updated reference intervals.       This note has been dictated using voice recognition software. Any grammatical or context distortions are unintentional and inherent to the software    Electronically signed by: Meseret Nolen CNP

## 2025-05-12 ENCOUNTER — DOCUMENTATION ONLY (OUTPATIENT)
Dept: OTHER | Facility: CLINIC | Age: 89
End: 2025-05-12
Payer: COMMERCIAL

## 2025-05-21 ENCOUNTER — NURSING HOME VISIT (OUTPATIENT)
Dept: GERIATRICS | Facility: CLINIC | Age: 89
End: 2025-05-21
Payer: COMMERCIAL

## 2025-05-21 VITALS
DIASTOLIC BLOOD PRESSURE: 72 MMHG | RESPIRATION RATE: 14 BRPM | HEIGHT: 71 IN | SYSTOLIC BLOOD PRESSURE: 120 MMHG | OXYGEN SATURATION: 97 % | TEMPERATURE: 97.2 F | HEART RATE: 65 BPM | BODY MASS INDEX: 28.53 KG/M2 | WEIGHT: 203.8 LBS

## 2025-05-21 DIAGNOSIS — K21.00 GASTROESOPHAGEAL REFLUX DISEASE WITH ESOPHAGITIS, UNSPECIFIED WHETHER HEMORRHAGE: ICD-10-CM

## 2025-05-21 DIAGNOSIS — K59.01 SLOW TRANSIT CONSTIPATION: ICD-10-CM

## 2025-05-21 DIAGNOSIS — J43.2 CENTRILOBULAR EMPHYSEMA (H): Primary | ICD-10-CM

## 2025-05-21 DIAGNOSIS — R41.89 COGNITIVE IMPAIRMENT: ICD-10-CM

## 2025-05-21 NOTE — LETTER
5/21/2025      [facility]  [address]  [place] [address]        [facility]    Code Status:  DNR   Visit Type:   Chief Complaint   Patient presents with     TCU Follow Up     Facility:  [facility] () [[id number]]           History of Present Illness: Solitario Benjamin is a 86 year old male who I am seeing today for follow up on LTC. Past medical history includes GERD with large hiatal hernia, gastritis, hypertension, acute on chronic systolic heart failure, diabetes mellitus type 2, prostate CA and weight loss.    Today patient sitting up in bedside chair. Patient with underlying cognitive impairment and is a poor historian. Pt recently seen. RSV and COVID negative. Underlying COPD. Pt with recent seasonal allergy symptoms including runny nose and wheezing. CXR was obtained which was negative for acute findings. He was treated with Claritin. Today no wheezing. No SOB or CP. He also had thrush. He was treated with Nystatin S&S. Thrush now resolved. Constipation. Xray was obtained which showed constipation. Pt started on Senna S 1 tab BID. He is having regular bowel movements now.        Assessment/plan:      Hx of Centrilobular emphysema (H)  -COVID, RSV negative.   -CXR negative.   -Claritin complete.   -No SOB or CP. No wheezing today.     Thrush  -Nystatin swish and spit complete.   -Thrush resolved.     Constipation.   -Senna S BID.   -stop colace.   -bowels now moving regularly.     Gastroesophageal reflux disease with esophagitis, unspecified whether hemorrhage  -Continue Protonix 20 mg daily.     Advanced Dementia  -Occ resistive to cares.         Active Ambulatory Problems     Diagnosis Date Noted     Lactic acid acidosis 01/21/2018     Accelerated hypertension 01/21/2018     Type 2 diabetes mellitus without complication, without long-term current use of insulin (H) 01/21/2018     Interstitial pneumonia (H) 01/22/2018     Essential hypertension  04/01/2022     COPD w Centrilobular emphysema  04/01/2022     Dyspnea on exertion 04/01/2022     Symptomatic anemia 04/01/2022     Ecchymosis of forearm 04/01/2022     NSTEMI -- demand ischemia 04/02/2022     Iron deficiency anemia due to chronic blood loss 04/04/2022     Acute blood loss anemia 04/04/2022     Thyroid nodule 04/07/2022     Obstructive sleep apnea syndrome 04/07/2022     Mixed hyperlipidemia 04/07/2022     Mild cognitive disorder 04/07/2022     History of malignant neoplasm of prostate 04/07/2022     History of fall 04/07/2022     Gastroesophageal reflux disease without esophagitis 04/07/2022     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     Gastrointestinal hemorrhage associated with peptic ulcer 04/19/2022     Gastroesophageal reflux disease with esophagitis, unspecified whether hemorrhage 04/19/2022     Urinary retention 04/19/2022     Weight loss 04/19/2022     Hypertensive heart disease with heart failure (H) 07/31/2024     Chronic kidney disease, stage 3a (H) 07/31/2024     Resolved Ambulatory Problems     Diagnosis Date Noted     PAVAN (acute kidney injury) 01/21/2018     Tachycardia 01/21/2018     Respiratory infection 01/21/2018     SOB (shortness of breath) 04/01/2022     Acute on chronic systolic heart failure (H) 04/02/2022     Prostate cancer (H) 04/04/2022     Moderate malnutrition 04/05/2022     No Additional Past Medical History       Current Outpatient Medications:      acetaminophen (TYLENOL) 325 MG tablet, Take 2 tablets (650 mg) by mouth every 4 hours as needed for mild pain or fever, Disp: , Rfl: 0     docusate sodium (COLACE) 100 MG capsule, Take 100 mg by mouth daily, Disp: , Rfl:      furosemide (LASIX) 20 MG tablet, Take 1 tablet (20 mg) by mouth daily, Disp: , Rfl: 0     loratadine (CLARITIN REDITABS) 10 MG ODT, Take 10 mg by mouth daily., Disp: , Rfl:      losartan (COZAAR) 25 MG tablet, Take 1 tablet (25 mg) by mouth daily, Disp: , Rfl: 0     metFORMIN (GLUCOPHAGE) 500 MG tablet,  "Take 500 mg by mouth 2 times daily (with meals) Increased am dose to 1gm, Disp: , Rfl:      metoprolol succinate ER (TOPROL-XL) 25 MG 24 hr tablet, Take 1 tablet (25 mg) by mouth daily, Disp: , Rfl: 0     pantoprazole (PROTONIX) 40 MG EC tablet, Take 1 tablet (40 mg) by mouth 2 times daily (Patient taking differently: Take 20 mg by mouth daily), Disp: , Rfl: 0     rosuvastatin (CRESTOR) 5 MG tablet, Take 1 tablet (5 mg) by mouth every evening, Disp: , Rfl: 0     senna-docusate (SENOKOT-S/PERICOLACE) 8.6-50 MG tablet, Take 1 tablet by mouth 2 times daily, Disp: , Rfl: 0     spironolactone (ALDACTONE) 25 MG tablet, Take 1 tablet (25 mg) by mouth daily, Disp: , Rfl: 0     tamsulosin (FLOMAX) 0.4 MG capsule, Take 0.4 mg by mouth in the morning., Disp: , Rfl:   No Known Allergies    All Meds and Allergies reviewed in the record at the facility and is the most up-to-date    REVIEW OF SYSTEMS:   Review of Systems  Seven-point review of systems reviewed.  Pertinent positives in HPI.    PHYSICAL EXAMINATION:  Physical Exam     Vital signs: /72   Pulse 65   Temp 97.2  F (36.2  C)   Resp 14   Ht 1.803 m (5' 11\")   Wt 92.4 kg (203 lb 12.8 oz)   SpO2 97%   BMI 28.42 kg/m    General: Awake, Alert, oriented x1, sitting up in wheelchair, follows simple commands  HEENT: Pink conjunctiva, moist oral mucosa, thrush resolved, no rhinorrhea.   NECK: Supple  CARDIOVASCULAR: S1, S2 without murmur or gallop.   RESPIRATORY: No wheezes, rales or rhonchi.   ABDOMEN: Soft, non distended with positive bowel sounds.   BACK: No kyphosis of the thoracic spine  EXTREMITIES: Moves both upper and lower extremities with generalized weakness. No edema.   NEUROLOGIC: Intact  PSYCHIATRIC: Cognitive impairment noted.      Labs:  All labs reviewed in the nursing home record and Epic   @  Lab Results   Component Value Date    WBC 7.7 04/05/2022     Lab Results   Component Value Date    RBC 3.97 04/05/2022     Lab Results   Component Value Date "    HGB 8.5 04/06/2022     Lab Results   Component Value Date    HCT 30.6 04/05/2022     Lab Results   Component Value Date    MCV 77 04/05/2022     Lab Results   Component Value Date    MCH 21.9 04/05/2022     Lab Results   Component Value Date    MCHC 28.4 04/05/2022     Lab Results   Component Value Date    RDW 27.3 04/05/2022     Lab Results   Component Value Date     04/05/2022        @Last Comprehensive Metabolic Panel:  Sodium   Date Value Ref Range Status   05/08/2025 138 135 - 145 mmol/L Final     Potassium   Date Value Ref Range Status   05/08/2025 3.8 3.4 - 5.3 mmol/L Final   04/11/2022 3.9 3.5 - 5.0 mmol/L Final     Chloride   Date Value Ref Range Status   05/08/2025 101 98 - 107 mmol/L Final   04/11/2022 103 98 - 107 mmol/L Final     Carbon Dioxide (CO2)   Date Value Ref Range Status   05/08/2025 24 22 - 29 mmol/L Final   04/11/2022 23 22 - 31 mmol/L Final     Anion Gap   Date Value Ref Range Status   05/08/2025 13 7 - 15 mmol/L Final   04/11/2022 12 5 - 18 mmol/L Final     Glucose   Date Value Ref Range Status   05/08/2025 131 (H) 70 - 99 mg/dL Final   04/11/2022 103 70 - 125 mg/dL Final     Urea Nitrogen   Date Value Ref Range Status   05/08/2025 15.3 8.0 - 23.0 mg/dL Final   04/11/2022 13 8 - 28 mg/dL Final     Creatinine   Date Value Ref Range Status   05/08/2025 1.19 (H) 0.67 - 1.17 mg/dL Final     GFR Estimate   Date Value Ref Range Status   05/08/2025 59 (L) >60 mL/min/1.73m2 Final   03/17/2021 >60 >60 mL/min/1.73m2 Final     Calcium   Date Value Ref Range Status   05/08/2025 9.4 8.8 - 10.4 mg/dL Final       This note has been dictated using voice recognition software. Any grammatical or context distortions are unintentional and inherent to the software    Electronically signed by: Meseret Nolen CNP       Sincerely,        Meseret Nolen NP    Electronically signed

## 2025-05-22 NOTE — PROGRESS NOTES
[facility]    Code Status:  DNR   Visit Type:   Chief Complaint   Patient presents with    TCU Follow Up     Facility:  [facility] () [[id number]]           History of Present Illness: Solitario Benjamin is a 86 year old male who I am seeing today for follow up on LTC. Past medical history includes GERD with large hiatal hernia, gastritis, hypertension, acute on chronic systolic heart failure, diabetes mellitus type 2, prostate CA and weight loss.    Today patient sitting up in bedside chair. Patient with underlying cognitive impairment and is a poor historian. Pt recently seen. RSV and COVID negative. Underlying COPD. Pt with recent seasonal allergy symptoms including runny nose and wheezing. CXR was obtained which was negative for acute findings. He was treated with Claritin. Today no wheezing. No SOB or CP. He also had thrush. He was treated with Nystatin S&S. Thrush now resolved. Constipation. Xray was obtained which showed constipation. Pt started on Senna S 1 tab BID. He is having regular bowel movements now.        Assessment/plan:      Hx of Centrilobular emphysema (H)  -COVID, RSV negative.   -CXR negative.   -Claritin complete.   -No SOB or CP. No wheezing today.     Thrush  -Nystatin swish and spit complete.   -Thrush resolved.     Constipation.   -Senna S BID.   -stop colace.   -bowels now moving regularly.     Gastroesophageal reflux disease with esophagitis, unspecified whether hemorrhage  -Continue Protonix 20 mg daily.     Advanced Dementia  -Occ resistive to cares.         Active Ambulatory Problems     Diagnosis Date Noted    Lactic acid acidosis 01/21/2018    Accelerated hypertension 01/21/2018    Type 2 diabetes mellitus without complication, without long-term current use of insulin (H) 01/21/2018    Interstitial pneumonia (H) 01/22/2018    Essential hypertension 04/01/2022    COPD w Centrilobular emphysema  04/01/2022    Dyspnea on exertion 04/01/2022    Symptomatic  anemia 04/01/2022    Ecchymosis of forearm 04/01/2022    NSTEMI -- demand ischemia 04/02/2022    Iron deficiency anemia due to chronic blood loss 04/04/2022    Acute blood loss anemia 04/04/2022    Thyroid nodule 04/07/2022    Obstructive sleep apnea syndrome 04/07/2022    Mixed hyperlipidemia 04/07/2022    Mild cognitive disorder 04/07/2022    History of malignant neoplasm of prostate 04/07/2022    History of fall 04/07/2022    Gastroesophageal reflux disease without esophagitis 04/07/2022    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    Gastrointestinal hemorrhage associated with peptic ulcer 04/19/2022    Gastroesophageal reflux disease with esophagitis, unspecified whether hemorrhage 04/19/2022    Urinary retention 04/19/2022    Weight loss 04/19/2022    Hypertensive heart disease with heart failure (H) 07/31/2024    Chronic kidney disease, stage 3a (H) 07/31/2024     Resolved Ambulatory Problems     Diagnosis Date Noted    PAVAN (acute kidney injury) 01/21/2018    Tachycardia 01/21/2018    Respiratory infection 01/21/2018    SOB (shortness of breath) 04/01/2022    Acute on chronic systolic heart failure (H) 04/02/2022    Prostate cancer (H) 04/04/2022    Moderate malnutrition 04/05/2022     No Additional Past Medical History       Current Outpatient Medications:     acetaminophen (TYLENOL) 325 MG tablet, Take 2 tablets (650 mg) by mouth every 4 hours as needed for mild pain or fever, Disp: , Rfl: 0    docusate sodium (COLACE) 100 MG capsule, Take 100 mg by mouth daily, Disp: , Rfl:     furosemide (LASIX) 20 MG tablet, Take 1 tablet (20 mg) by mouth daily, Disp: , Rfl: 0    loratadine (CLARITIN REDITABS) 10 MG ODT, Take 10 mg by mouth daily., Disp: , Rfl:     losartan (COZAAR) 25 MG tablet, Take 1 tablet (25 mg) by mouth daily, Disp: , Rfl: 0    metFORMIN (GLUCOPHAGE) 500 MG tablet, Take 500 mg by mouth 2 times daily (with meals) Increased am dose to 1gm, Disp: , Rfl:     metoprolol succinate ER (TOPROL-XL) 25 MG 24 hr  "tablet, Take 1 tablet (25 mg) by mouth daily, Disp: , Rfl: 0    pantoprazole (PROTONIX) 40 MG EC tablet, Take 1 tablet (40 mg) by mouth 2 times daily (Patient taking differently: Take 20 mg by mouth daily), Disp: , Rfl: 0    rosuvastatin (CRESTOR) 5 MG tablet, Take 1 tablet (5 mg) by mouth every evening, Disp: , Rfl: 0    senna-docusate (SENOKOT-S/PERICOLACE) 8.6-50 MG tablet, Take 1 tablet by mouth 2 times daily, Disp: , Rfl: 0    spironolactone (ALDACTONE) 25 MG tablet, Take 1 tablet (25 mg) by mouth daily, Disp: , Rfl: 0    tamsulosin (FLOMAX) 0.4 MG capsule, Take 0.4 mg by mouth in the morning., Disp: , Rfl:   No Known Allergies    All Meds and Allergies reviewed in the record at the facility and is the most up-to-date    REVIEW OF SYSTEMS:   Review of Systems  Seven-point review of systems reviewed.  Pertinent positives in HPI.    PHYSICAL EXAMINATION:  Physical Exam     Vital signs: /72   Pulse 65   Temp 97.2  F (36.2  C)   Resp 14   Ht 1.803 m (5' 11\")   Wt 92.4 kg (203 lb 12.8 oz)   SpO2 97%   BMI 28.42 kg/m    General: Awake, Alert, oriented x1, sitting up in wheelchair, follows simple commands  HEENT: Pink conjunctiva, moist oral mucosa, thrush resolved, no rhinorrhea.   NECK: Supple  CARDIOVASCULAR: S1, S2 without murmur or gallop.   RESPIRATORY: No wheezes, rales or rhonchi.   ABDOMEN: Soft, non distended with positive bowel sounds.   BACK: No kyphosis of the thoracic spine  EXTREMITIES: Moves both upper and lower extremities with generalized weakness. No edema.   NEUROLOGIC: Intact  PSYCHIATRIC: Cognitive impairment noted.      Labs:  All labs reviewed in the nursing home record and [facility]   @  Lab Results   Component Value Date    WBC 7.7 04/05/2022     Lab Results   Component Value Date    RBC 3.97 04/05/2022     Lab Results   Component Value Date    HGB 8.5 04/06/2022     Lab Results   Component Value Date    HCT 30.6 04/05/2022     Lab Results   Component Value Date    MCV 77 04/05/2022 "     Lab Results   Component Value Date    MCH 21.9 04/05/2022     Lab Results   Component Value Date    MCHC 28.4 04/05/2022     Lab Results   Component Value Date    RDW 27.3 04/05/2022     Lab Results   Component Value Date     04/05/2022        @Last Comprehensive Metabolic Panel:  Sodium   Date Value Ref Range Status   05/08/2025 138 135 - 145 mmol/L Final     Potassium   Date Value Ref Range Status   05/08/2025 3.8 3.4 - 5.3 mmol/L Final   04/11/2022 3.9 3.5 - 5.0 mmol/L Final     Chloride   Date Value Ref Range Status   05/08/2025 101 98 - 107 mmol/L Final   04/11/2022 103 98 - 107 mmol/L Final     Carbon Dioxide (CO2)   Date Value Ref Range Status   05/08/2025 24 22 - 29 mmol/L Final   04/11/2022 23 22 - 31 mmol/L Final     Anion Gap   Date Value Ref Range Status   05/08/2025 13 7 - 15 mmol/L Final   04/11/2022 12 5 - 18 mmol/L Final     Glucose   Date Value Ref Range Status   05/08/2025 131 (H) 70 - 99 mg/dL Final   04/11/2022 103 70 - 125 mg/dL Final     Urea Nitrogen   Date Value Ref Range Status   05/08/2025 15.3 8.0 - 23.0 mg/dL Final   04/11/2022 13 8 - 28 mg/dL Final     Creatinine   Date Value Ref Range Status   05/08/2025 1.19 (H) 0.67 - 1.17 mg/dL Final     GFR Estimate   Date Value Ref Range Status   05/08/2025 59 (L) >60 mL/min/1.73m2 Final   03/17/2021 >60 >60 mL/min/1.73m2 Final     Calcium   Date Value Ref Range Status   05/08/2025 9.4 8.8 - 10.4 mg/dL Final       This note has been dictated using voice recognition software. Any grammatical or context distortions are unintentional and inherent to the software    Electronically signed by: Meseret Nolen, CNP

## 2025-07-21 NOTE — PROGRESS NOTES
[facility] GERIATRIC SERVICES       Patient Solitario Benjamin  MRN: [id number]        Reason for Visit     Chief Complaint   Patient presents with    long-term Regulatory       Code Status     DNR only    Assessment     Cognitive impairment BIMS 7/ 15  COPD  Hypertension  Type 2 diabetes last A1C 7.5 now with most BG <150  Chronic anemia due to blood loss  GERD  CHF  History of BPH/ h/o of prostate ca  HECTOR  Hyponatremia   CKD3a  Generalized weakness  Recurrent falls    Plan     Pt is a resident of long-term care  At baseline he is poorly ambulatory and wheelchair-bound  Has had decline recently with worsening cognitive impairment  At best he is alert to self.  BIMS of 7/15  Mood is stable.  He has had some sexual inappropriate behaviors  in the past but no new behaviors reported  Not on any psych meds.  Diabetes controlled with p.o. metformin blood sugars being done by twice daily  All blood sugars are under 150 with very tight control noted  Suspect this is due to decline.  Will DC blood sugar checks  Decrease his metformin to 1 g in the morning 500 at bedtime   Recheck an A1c in a few weeks  CHF controlled with low-dose of Lasix and spironolactone  No evidence of lymphedema noted today  Hypertension controlled with losartan and metoprolol  Has fallen recently -no injury  Continue with his current care plan -reviewed with nursing    History     Patient is a very pleasant 89 year old male who is a resident of long-term care  At baseline he has cognitive impairment due to which limited recall of recent events.  BIMS 7/15  More confusion noted.  Mood is pleasant but confused with limited recall  Staff reporting some ongoing behaviors relating to sexual inappropriate behaviors especially in public area  However he has been redirectable.  No recent behaviors reported by staff no concerns .  he is not on any psych meds    Has become weaker and chair bound .  Has COPD but denies any wheezing  Using only her as needed  albuterol.  No concerns per staff  Diabetes control improved and he is on p.o. metformin.bg have been low  Fall reported no injury; has had 3-4 falls recently  Weights have been stable      Past Medical & Surgical History     PAST MEDICAL HISTORY:   Past Medical History:   Diagnosis Date    Accelerated hypertension 1/21/2018    Acute on chronic systolic heart failure (H) 4/2/2022    Centrilobular emphysema (H) 4/1/2022    Elevated troponin level not due to acute coronary syndrome 4/2/2022    Type 2 diabetes mellitus without complication, without long-term current use of insulin (H) 1/21/2018      PAST SURGICAL HISTORY:   has a past surgical history that includes Esophagoscopy, gastroscopy, duodenoscopy (EGD), combined (N/A, 4/4/2022) and Colonoscopy (N/A, 4/4/2022).      Past Social History     Reviewed,  reports that he quit smoking about 42 years ago. His smoking use included cigarettes and cigarettes. He has quit using smokeless tobacco. He reports that he does not drink alcohol and does not use drugs.    Family History     Reviewed, and family history includes Myocardial Infarction in his brother, father, mother, and sister; Sleep Apnea in his child.    Medication List     Current Outpatient Medications   Medication Sig Dispense Refill    acetaminophen (TYLENOL) 325 MG tablet Take 2 tablets (650 mg) by mouth every 4 hours as needed for mild pain or fever  0    docusate sodium (COLACE) 100 MG capsule Take 100 mg by mouth daily      furosemide (LASIX) 20 MG tablet Take 1 tablet (20 mg) by mouth daily  0    loratadine (CLARITIN REDITABS) 10 MG ODT Take 10 mg by mouth daily.      losartan (COZAAR) 25 MG tablet Take 1 tablet (25 mg) by mouth daily  0    metFORMIN (GLUCOPHAGE) 500 MG tablet Take 500 mg by mouth 2 times daily (with meals) Increased am dose to 1gm      metoprolol succinate ER (TOPROL-XL) 25 MG 24 hr tablet Take 1 tablet (25 mg) by mouth daily  0    pantoprazole (PROTONIX) 40 MG EC tablet Take 1 tablet  "(40 mg) by mouth 2 times daily (Patient taking differently: Take 20 mg by mouth daily)  0    rosuvastatin (CRESTOR) 5 MG tablet Take 1 tablet (5 mg) by mouth every evening  0    senna-docusate (SENOKOT-S/PERICOLACE) 8.6-50 MG tablet Take 1 tablet by mouth 2 times daily  0    spironolactone (ALDACTONE) 25 MG tablet Take 1 tablet (25 mg) by mouth daily  0    tamsulosin (FLOMAX) 0.4 MG capsule Take 0.4 mg by mouth in the morning.       No current facility-administered medications for this visit.          Allergies     No Known Allergies    Review of Systems   A comprehensive review of 14 systems not done patient has limited insight into his situation and has cognitive impairment      Physical Exam   BP (!) 157/78   Pulse 66   Temp 97.7  F (36.5  C)   Resp 18   Ht 1.803 m (5' 11\")   Wt 92.1 kg (203 lb)   SpO2 95%   BMI 28.31 kg/m     GENERAL: no acute distress. Cooperative in conversation.   obese  HEENT: pupils are equal, round and reactive. Oral mucosa is moist and intact.  RESP:Chest symmetric. Regular respiratory rate. No stridor.  CVS S1S2  ABD: Nondistended, soft.  EXTREMITIES: No lower extremity edema.   NEURO: non focal. Alert and oriented x  self WITH LIMITED RECALL.   PSYCH: within normal limits. No depression or anxiety.  SKIN: warm dry intact     Labs  Last Comprehensive Metabolic Panel:  Lab Results   Component Value Date     05/08/2025    POTASSIUM 3.8 05/08/2025    CHLORIDE 101 05/08/2025    CO2 24 05/08/2025    ANIONGAP 13 05/08/2025     (H) 05/08/2025    BUN 15.3 05/08/2025    CR 1.19 (H) 05/08/2025    GFRESTIMATED 59 (L) 05/08/2025    GUILLE 9.4 05/08/2025          Electronically signed by    Sailaja Bardales MD                       "

## 2025-07-23 ENCOUNTER — NURSING HOME VISIT (OUTPATIENT)
Dept: GERIATRICS | Facility: CLINIC | Age: 89
End: 2025-07-23
Payer: COMMERCIAL

## 2025-07-23 VITALS
WEIGHT: 203 LBS | BODY MASS INDEX: 28.42 KG/M2 | HEIGHT: 71 IN | TEMPERATURE: 97.7 F | HEART RATE: 66 BPM | SYSTOLIC BLOOD PRESSURE: 157 MMHG | RESPIRATION RATE: 18 BRPM | OXYGEN SATURATION: 95 % | DIASTOLIC BLOOD PRESSURE: 78 MMHG

## 2025-07-23 DIAGNOSIS — I10 ESSENTIAL HYPERTENSION: ICD-10-CM

## 2025-07-23 DIAGNOSIS — N18.31 CHRONIC KIDNEY DISEASE, STAGE 3A (H): ICD-10-CM

## 2025-07-23 DIAGNOSIS — G47.33 OBSTRUCTIVE SLEEP APNEA SYNDROME: ICD-10-CM

## 2025-07-23 DIAGNOSIS — I11.0 HYPERTENSIVE HEART DISEASE WITH HEART FAILURE (H): ICD-10-CM

## 2025-07-23 DIAGNOSIS — I50.32 CHRONIC DIASTOLIC HEART FAILURE (H): ICD-10-CM

## 2025-07-23 DIAGNOSIS — E11.9 TYPE 2 DIABETES MELLITUS WITHOUT COMPLICATION, WITHOUT LONG-TERM CURRENT USE OF INSULIN (H): ICD-10-CM

## 2025-07-23 DIAGNOSIS — J43.2 CENTRILOBULAR EMPHYSEMA (H): Primary | ICD-10-CM

## 2025-07-23 DIAGNOSIS — N40.0 BENIGN PROSTATIC HYPERPLASIA WITHOUT LOWER URINARY TRACT SYMPTOMS: ICD-10-CM

## 2025-07-23 PROCEDURE — 99309 SBSQ NF CARE MODERATE MDM 30: CPT | Performed by: FAMILY MEDICINE

## 2025-07-23 NOTE — LETTER
7/23/2025      Solitario Benjamin  [facility]  [address] [address]        [facility] GERIATRIC SERVICES       Patient Solitario Benjamin  MRN: [id number]        Reason for Visit     Chief Complaint   Patient presents with     [facility] Regulatory       Code Status     DNR only    Assessment     Cognitive impairment BIMS 7/ 15  COPD  Hypertension  Type 2 diabetes last A1C 7.5 now with most BG <150  Chronic anemia due to blood loss  GERD  CHF  History of BPH/ h/o of prostate ca  HECTOR  Hyponatremia   CKD3a  Generalized weakness  Recurrent falls    Plan     Pt is a resident of long-term care  At baseline he is poorly ambulatory and wheelchair-bound  Has had decline recently with worsening cognitive impairment  At best he is alert to self.  BIMS of 7/15  Mood is stable.  He has had some sexual inappropriate behaviors  in the past but no new behaviors reported  Not on any psych meds.  Diabetes controlled with p.o. metformin blood sugars being done by twice daily  All blood sugars are under 150 with very tight control noted  Suspect this is due to decline.  Will DC blood sugar checks  Decrease his metformin to 1 g in the morning 500 at bedtime   Recheck an A1c in a few weeks  CHF controlled with low-dose of Lasix and spironolactone  No evidence of lymphedema noted today  Hypertension controlled with losartan and metoprolol  Has fallen recently -no injury  Continue with his current care plan -reviewed with nursing    History     Patient is a very pleasant 89 year old male who is a resident of long-term care  At baseline he has cognitive impairment due to which limited recall of recent events.  BIMS 7/15  More confusion noted.  Mood is pleasant but confused with limited recall  Staff reporting some ongoing behaviors relating to sexual inappropriate behaviors especially in public area  However he has been redirectable.  No recent behaviors reported by staff no concerns .  he is not on any psych  meds    Has become weaker and chair bound .  Has COPD but denies any wheezing  Using only her as needed albuterol.  No concerns per staff  Diabetes control improved and he is on p.o. metformin.bg have been low  Fall reported no injury; has had 3-4 falls recently  Weights have been stable      Past Medical & Surgical History     PAST MEDICAL HISTORY:   Past Medical History:   Diagnosis Date     Accelerated hypertension 1/21/2018     Acute on chronic systolic heart failure (H) 4/2/2022     Centrilobular emphysema (H) 4/1/2022     Elevated troponin level not due to acute coronary syndrome 4/2/2022     Type 2 diabetes mellitus without complication, without long-term current use of insulin (H) 1/21/2018      PAST SURGICAL HISTORY:   has a past surgical history that includes Esophagoscopy, gastroscopy, duodenoscopy (EGD), combined (N/A, 4/4/2022) and Colonoscopy (N/A, 4/4/2022).      Past Social History     Reviewed,  reports that he quit smoking about 42 years ago. His smoking use included cigarettes and cigarettes. He has quit using smokeless tobacco. He reports that he does not drink alcohol and does not use drugs.    Family History     Reviewed, and family history includes Myocardial Infarction in his brother, father, mother, and sister; Sleep Apnea in his child.    Medication List     Current Outpatient Medications   Medication Sig Dispense Refill     acetaminophen (TYLENOL) 325 MG tablet Take 2 tablets (650 mg) by mouth every 4 hours as needed for mild pain or fever  0     docusate sodium (COLACE) 100 MG capsule Take 100 mg by mouth daily       furosemide (LASIX) 20 MG tablet Take 1 tablet (20 mg) by mouth daily  0     loratadine (CLARITIN REDITABS) 10 MG ODT Take 10 mg by mouth daily.       losartan (COZAAR) 25 MG tablet Take 1 tablet (25 mg) by mouth daily  0     metFORMIN (GLUCOPHAGE) 500 MG tablet Take 500 mg by mouth 2 times daily (with meals) Increased am dose to 1gm       metoprolol succinate ER (TOPROL-XL)  "25 MG 24 hr tablet Take 1 tablet (25 mg) by mouth daily  0     pantoprazole (PROTONIX) 40 MG EC tablet Take 1 tablet (40 mg) by mouth 2 times daily (Patient taking differently: Take 20 mg by mouth daily)  0     rosuvastatin (CRESTOR) 5 MG tablet Take 1 tablet (5 mg) by mouth every evening  0     senna-docusate (SENOKOT-S/PERICOLACE) 8.6-50 MG tablet Take 1 tablet by mouth 2 times daily  0     spironolactone (ALDACTONE) 25 MG tablet Take 1 tablet (25 mg) by mouth daily  0     tamsulosin (FLOMAX) 0.4 MG capsule Take 0.4 mg by mouth in the morning.       No current facility-administered medications for this visit.          Allergies     No Known Allergies    Review of Systems   A comprehensive review of 14 systems not done patient has limited insight into his situation and has cognitive impairment      Physical Exam   BP (!) 157/78   Pulse 66   Temp 97.7  F (36.5  C)   Resp 18   Ht 1.803 m (5' 11\")   Wt 92.1 kg (203 lb)   SpO2 95%   BMI 28.31 kg/m     GENERAL: no acute distress. Cooperative in conversation.   obese  HEENT: pupils are equal, round and reactive. Oral mucosa is moist and intact.  RESP:Chest symmetric. Regular respiratory rate. No stridor.  CVS S1S2  ABD: Nondistended, soft.  EXTREMITIES: No lower extremity edema.   NEURO: non focal. Alert and oriented x  self WITH LIMITED RECALL.   PSYCH: within normal limits. No depression or anxiety.  SKIN: warm dry intact     Labs  Last Comprehensive Metabolic Panel:  Lab Results   Component Value Date     05/08/2025    POTASSIUM 3.8 05/08/2025    CHLORIDE 101 05/08/2025    CO2 24 05/08/2025    ANIONGAP 13 05/08/2025     (H) 05/08/2025    BUN 15.3 05/08/2025    CR 1.19 (H) 05/08/2025    GFRESTIMATED 59 (L) 05/08/2025    GUILLE 9.4 05/08/2025          Electronically signed by    Sailaja Bardales MD                           Sincerely,        LINWOOD Zendejas    Electronically signed  "

## 2025-07-31 ENCOUNTER — LAB REQUISITION (OUTPATIENT)
Dept: LAB | Facility: CLINIC | Age: 89
End: 2025-07-31
Payer: COMMERCIAL

## 2025-07-31 DIAGNOSIS — E11.9 TYPE 2 DIABETES MELLITUS WITHOUT COMPLICATIONS (H): ICD-10-CM

## 2025-08-04 LAB
EST. AVERAGE GLUCOSE BLD GHB EST-MCNC: 140 MG/DL
HBA1C MFR BLD: 6.5 %

## 2025-08-04 PROCEDURE — P9604 ONE-WAY ALLOW PRORATED TRIP: HCPCS | Mod: ORL | Performed by: NURSE PRACTITIONER

## 2025-08-04 PROCEDURE — 83036 HEMOGLOBIN GLYCOSYLATED A1C: CPT | Mod: ORL | Performed by: NURSE PRACTITIONER

## 2025-08-04 PROCEDURE — 36415 COLL VENOUS BLD VENIPUNCTURE: CPT | Mod: ORL | Performed by: NURSE PRACTITIONER

## (undated) DEVICE — SUCTION MANIFOLD NEPTUNE 2 SYS 1 PORT 702-025-000

## (undated) DEVICE — TUBING SUCTION MEDI-VAC 1/4"X20' N620A - HE

## (undated) DEVICE — FORCEP BIOPSY 2.3MM DISP COATED 000388

## (undated) DEVICE — SOL WATER IRRIG 1000ML BOTTLE 2F7114

## (undated) RX ORDER — PROPOFOL 10 MG/ML
INJECTION, EMULSION INTRAVENOUS
Status: DISPENSED
Start: 2022-04-04

## (undated) RX ORDER — ONDANSETRON 2 MG/ML
INJECTION INTRAMUSCULAR; INTRAVENOUS
Status: DISPENSED
Start: 2022-04-04

## (undated) RX ORDER — FENTANYL CITRATE-0.9 % NACL/PF 10 MCG/ML
PLASTIC BAG, INJECTION (ML) INTRAVENOUS
Status: DISPENSED
Start: 2022-04-04